# Patient Record
Sex: MALE | Race: WHITE | NOT HISPANIC OR LATINO | Employment: UNEMPLOYED | URBAN - NONMETROPOLITAN AREA
[De-identification: names, ages, dates, MRNs, and addresses within clinical notes are randomized per-mention and may not be internally consistent; named-entity substitution may affect disease eponyms.]

---

## 2017-01-23 ENCOUNTER — LAB (OUTPATIENT)
Dept: LAB | Facility: HOSPITAL | Age: 52
End: 2017-01-23

## 2017-01-23 ENCOUNTER — TRANSCRIBE ORDERS (OUTPATIENT)
Dept: LAB | Facility: HOSPITAL | Age: 52
End: 2017-01-23

## 2017-01-23 DIAGNOSIS — G50.0 TRIGEMINAL NEURALGIA: ICD-10-CM

## 2017-01-23 DIAGNOSIS — I10 ESSENTIAL HYPERTENSION, MALIGNANT: ICD-10-CM

## 2017-01-23 DIAGNOSIS — Z51.81 ENCOUNTER FOR THERAPEUTIC DRUG MONITORING: ICD-10-CM

## 2017-01-23 DIAGNOSIS — Z51.81 ENCOUNTER FOR THERAPEUTIC DRUG MONITORING: Primary | ICD-10-CM

## 2017-01-23 LAB
ANION GAP SERPL CALCULATED.3IONS-SCNC: 13 MMOL/L (ref 5–15)
BASOPHILS # BLD MANUAL: 0.06 10*3/MM3 (ref 0–0.2)
BASOPHILS NFR BLD AUTO: 1 % (ref 0–2)
BUN BLD-MCNC: 11 MG/DL (ref 7–21)
BUN/CREAT SERPL: 12.8 (ref 7–25)
CALCIUM SPEC-SCNC: 9.1 MG/DL (ref 8.4–10.2)
CARBAMAZEPINE SERPL-MCNC: 12.1 MCG/ML (ref 4–12)
CHLORIDE SERPL-SCNC: 95 MMOL/L (ref 95–110)
CO2 SERPL-SCNC: 28 MMOL/L (ref 22–31)
CREAT BLD-MCNC: 0.86 MG/DL (ref 0.7–1.3)
DEPRECATED RDW RBC AUTO: 42 FL (ref 35.1–43.9)
EOSINOPHIL # BLD MANUAL: 0.18 10*3/MM3 (ref 0–0.7)
EOSINOPHIL NFR BLD MANUAL: 3 % (ref 0–7)
ERYTHROCYTE [DISTWIDTH] IN BLOOD BY AUTOMATED COUNT: 12.3 % (ref 11.5–14.5)
GFR SERPL CREATININE-BSD FRML MDRD: 94 ML/MIN/1.73 (ref 56–130)
GLUCOSE BLD-MCNC: 88 MG/DL (ref 60–100)
HCT VFR BLD AUTO: 39.9 % (ref 39–49)
HGB BLD-MCNC: 13.6 G/DL (ref 13.7–17.3)
LYMPHOCYTES # BLD MANUAL: 1.49 10*3/MM3 (ref 0.6–4.2)
LYMPHOCYTES NFR BLD MANUAL: 25 % (ref 10–50)
LYMPHOCYTES NFR BLD MANUAL: 3 % (ref 0–12)
MCH RBC QN AUTO: 31.4 PG (ref 26.5–34)
MCHC RBC AUTO-ENTMCNC: 34.1 G/DL (ref 31.5–36.3)
MCV RBC AUTO: 92.1 FL (ref 80–98)
MONOCYTES # BLD AUTO: 0.18 10*3/MM3 (ref 0–0.9)
NEUTROPHILS # BLD AUTO: 4.05 10*3/MM3 (ref 2–8.6)
NEUTROPHILS NFR BLD MANUAL: 68 % (ref 37–80)
PLAT MORPH BLD: NORMAL
PLATELET # BLD AUTO: 212 10*3/MM3 (ref 150–450)
PMV BLD AUTO: 9.8 FL (ref 8–12)
POTASSIUM BLD-SCNC: 4.1 MMOL/L (ref 3.5–5.1)
RBC # BLD AUTO: 4.33 10*6/MM3 (ref 4.37–5.74)
RBC MORPH BLD: NORMAL
SODIUM BLD-SCNC: 136 MMOL/L (ref 137–145)
WBC MORPH BLD: NORMAL
WBC NRBC COR # BLD: 5.95 10*3/MM3 (ref 3.2–9.8)

## 2017-01-23 PROCEDURE — 80156 ASSAY CARBAMAZEPINE TOTAL: CPT | Performed by: PSYCHIATRY & NEUROLOGY

## 2017-01-23 PROCEDURE — 85027 COMPLETE CBC AUTOMATED: CPT | Performed by: PSYCHIATRY & NEUROLOGY

## 2017-01-23 PROCEDURE — 80048 BASIC METABOLIC PNL TOTAL CA: CPT | Performed by: PSYCHIATRY & NEUROLOGY

## 2017-01-23 PROCEDURE — 36415 COLL VENOUS BLD VENIPUNCTURE: CPT

## 2017-01-23 PROCEDURE — 85007 BL SMEAR W/DIFF WBC COUNT: CPT | Performed by: PSYCHIATRY & NEUROLOGY

## 2017-10-25 ENCOUNTER — TRANSCRIBE ORDERS (OUTPATIENT)
Dept: LAB | Facility: HOSPITAL | Age: 52
End: 2017-10-25

## 2017-10-25 ENCOUNTER — LAB (OUTPATIENT)
Dept: LAB | Facility: HOSPITAL | Age: 52
End: 2017-10-25

## 2017-10-25 DIAGNOSIS — Z51.81 ENCOUNTER FOR THERAPEUTIC DRUG MONITORING: Primary | ICD-10-CM

## 2017-10-25 DIAGNOSIS — G50.0 TRIGEMINAL NEURALGIA: ICD-10-CM

## 2017-10-25 DIAGNOSIS — Z51.81 ENCOUNTER FOR THERAPEUTIC DRUG MONITORING: ICD-10-CM

## 2017-10-25 LAB
ALBUMIN SERPL-MCNC: 4.8 G/DL (ref 3.4–4.8)
ALBUMIN/GLOB SERPL: 1.5 G/DL (ref 1.1–1.8)
ALP SERPL-CCNC: 58 U/L (ref 38–126)
ALT SERPL W P-5'-P-CCNC: 38 U/L (ref 21–72)
ANION GAP SERPL CALCULATED.3IONS-SCNC: 17 MMOL/L (ref 5–15)
AST SERPL-CCNC: 34 U/L (ref 17–59)
BASOPHILS # BLD AUTO: 0.01 10*3/MM3 (ref 0–0.2)
BASOPHILS NFR BLD AUTO: 0.2 % (ref 0–2)
BILIRUB SERPL-MCNC: 0.4 MG/DL (ref 0.2–1.3)
BUN BLD-MCNC: 9 MG/DL (ref 7–21)
BUN/CREAT SERPL: 10.8 (ref 7–25)
CALCIUM SPEC-SCNC: 9.5 MG/DL (ref 8.4–10.2)
CARBAMAZEPINE SERPL-MCNC: 13.2 MCG/ML (ref 4–12)
CHLORIDE SERPL-SCNC: 93 MMOL/L (ref 95–110)
CO2 SERPL-SCNC: 27 MMOL/L (ref 22–31)
CREAT BLD-MCNC: 0.83 MG/DL (ref 0.7–1.3)
DEPRECATED RDW RBC AUTO: 40.4 FL (ref 35.1–43.9)
EOSINOPHIL # BLD AUTO: 0.1 10*3/MM3 (ref 0–0.7)
EOSINOPHIL NFR BLD AUTO: 2 % (ref 0–7)
ERYTHROCYTE [DISTWIDTH] IN BLOOD BY AUTOMATED COUNT: 12.1 % (ref 11.5–14.5)
GFR SERPL CREATININE-BSD FRML MDRD: 98 ML/MIN/1.73 (ref 56–130)
GLOBULIN UR ELPH-MCNC: 3.2 GM/DL (ref 2.3–3.5)
GLUCOSE BLD-MCNC: 87 MG/DL (ref 60–100)
HCT VFR BLD AUTO: 39.9 % (ref 39–49)
HGB BLD-MCNC: 14.2 G/DL (ref 13.7–17.3)
IMM GRANULOCYTES # BLD: 0.02 10*3/MM3 (ref 0–0.02)
IMM GRANULOCYTES NFR BLD: 0.4 % (ref 0–0.5)
LYMPHOCYTES # BLD AUTO: 1.38 10*3/MM3 (ref 0.6–4.2)
LYMPHOCYTES NFR BLD AUTO: 27.3 % (ref 10–50)
MCH RBC QN AUTO: 32.4 PG (ref 26.5–34)
MCHC RBC AUTO-ENTMCNC: 35.6 G/DL (ref 31.5–36.3)
MCV RBC AUTO: 91.1 FL (ref 80–98)
MONOCYTES # BLD AUTO: 0.64 10*3/MM3 (ref 0–0.9)
MONOCYTES NFR BLD AUTO: 12.6 % (ref 0–12)
NEUTROPHILS # BLD AUTO: 2.91 10*3/MM3 (ref 2–8.6)
NEUTROPHILS NFR BLD AUTO: 57.5 % (ref 37–80)
PLATELET # BLD AUTO: 241 10*3/MM3 (ref 150–450)
PMV BLD AUTO: 9.1 FL (ref 8–12)
POTASSIUM BLD-SCNC: 4.1 MMOL/L (ref 3.5–5.1)
PROT SERPL-MCNC: 8 G/DL (ref 6.3–8.6)
RBC # BLD AUTO: 4.38 10*6/MM3 (ref 4.37–5.74)
SODIUM BLD-SCNC: 137 MMOL/L (ref 137–145)
WBC NRBC COR # BLD: 5.06 10*3/MM3 (ref 3.2–9.8)

## 2017-10-25 PROCEDURE — 85025 COMPLETE CBC W/AUTO DIFF WBC: CPT | Performed by: PSYCHIATRY & NEUROLOGY

## 2017-10-25 PROCEDURE — 80053 COMPREHEN METABOLIC PANEL: CPT | Performed by: PSYCHIATRY & NEUROLOGY

## 2017-10-25 PROCEDURE — 36415 COLL VENOUS BLD VENIPUNCTURE: CPT

## 2017-10-25 PROCEDURE — 80156 ASSAY CARBAMAZEPINE TOTAL: CPT | Performed by: PSYCHIATRY & NEUROLOGY

## 2018-10-22 ENCOUNTER — LAB REQUISITION (OUTPATIENT)
Dept: LAB | Facility: OTHER | Age: 53
End: 2018-10-22

## 2018-10-22 DIAGNOSIS — I10 ESSENTIAL (PRIMARY) HYPERTENSION: ICD-10-CM

## 2018-10-22 DIAGNOSIS — M62.81 MUSCLE WEAKNESS (GENERALIZED): ICD-10-CM

## 2018-10-22 DIAGNOSIS — E78.5 HYPERLIPIDEMIA: ICD-10-CM

## 2018-10-23 LAB
25(OH)D3 SERPL-MCNC: <12.8 NG/ML (ref 30–100)
ALBUMIN SERPL-MCNC: 4 G/DL (ref 3.5–5)
ALBUMIN/GLOB SERPL: 0.9 G/DL (ref 1.1–1.8)
ALP SERPL-CCNC: 81 U/L (ref 38–126)
ALT SERPL W P-5'-P-CCNC: 61 U/L
ANION GAP SERPL CALCULATED.3IONS-SCNC: 11 MMOL/L (ref 5–15)
AST SERPL-CCNC: 43 U/L (ref 17–59)
BASOPHILS # BLD AUTO: 0.01 10*3/MM3 (ref 0–0.2)
BASOPHILS NFR BLD AUTO: 0.1 % (ref 0–2)
BILIRUB SERPL-MCNC: 0.3 MG/DL (ref 0.2–1.3)
BUN BLD-MCNC: 32 MG/DL (ref 9–20)
BUN/CREAT SERPL: 25 (ref 7–25)
CALCIUM SPEC-SCNC: 10.4 MG/DL (ref 8.4–10.2)
CHLORIDE SERPL-SCNC: 107 MMOL/L (ref 98–107)
CHOLEST SERPL-MCNC: 109 MG/DL (ref 150–200)
CO2 SERPL-SCNC: 28 MMOL/L (ref 22–30)
CREAT BLD-MCNC: 1.28 MG/DL (ref 0.66–1.25)
DEPRECATED RDW RBC AUTO: 49.1 FL (ref 35.1–43.9)
EOSINOPHIL # BLD AUTO: 0.1 10*3/MM3 (ref 0–0.7)
EOSINOPHIL NFR BLD AUTO: 1.5 % (ref 0–7)
ERYTHROCYTE [DISTWIDTH] IN BLOOD BY AUTOMATED COUNT: 14.2 % (ref 11.5–14.5)
GFR SERPL CREATININE-BSD FRML MDRD: 59 ML/MIN/1.73 (ref 56–130)
GLOBULIN UR ELPH-MCNC: 4.3 GM/DL (ref 2.3–3.5)
GLUCOSE BLD-MCNC: 98 MG/DL (ref 74–99)
HCT VFR BLD AUTO: 38.5 % (ref 39–49)
HDLC SERPL-MCNC: 29 MG/DL (ref 40–59)
HGB BLD-MCNC: 11.9 G/DL (ref 13.7–17.3)
LDLC SERPL CALC-MCNC: 58 MG/DL
LDLC/HDLC SERPL: 1.99 {RATIO} (ref 0–3.55)
LYMPHOCYTES # BLD AUTO: 1.07 10*3/MM3 (ref 0.6–4.2)
LYMPHOCYTES NFR BLD AUTO: 16 % (ref 10–50)
MCH RBC QN AUTO: 30.1 PG (ref 26.5–34)
MCHC RBC AUTO-ENTMCNC: 30.9 G/DL (ref 31.5–36.3)
MCV RBC AUTO: 97.5 FL (ref 80–98)
MONOCYTES # BLD AUTO: 0.79 10*3/MM3 (ref 0–0.9)
MONOCYTES NFR BLD AUTO: 11.8 % (ref 0–12)
NEUTROPHILS # BLD AUTO: 4.7 10*3/MM3 (ref 2–8.6)
NEUTROPHILS NFR BLD AUTO: 70.6 % (ref 37–80)
PLATELET # BLD AUTO: 453 10*3/MM3 (ref 150–450)
PMV BLD AUTO: 9.3 FL (ref 8–12)
POTASSIUM BLD-SCNC: 3.9 MMOL/L (ref 3.4–5)
PROT SERPL-MCNC: 8.3 G/DL (ref 6.3–8.2)
RBC # BLD AUTO: 3.95 10*6/MM3 (ref 4.37–5.74)
SODIUM BLD-SCNC: 146 MMOL/L (ref 137–145)
T4 SERPL-MCNC: 11.8 MCG/DL (ref 5.5–11)
TRIGL SERPL-MCNC: 112 MG/DL
TSH SERPL DL<=0.05 MIU/L-ACNC: 1.49 MIU/ML (ref 0.46–4.68)
VLDLC SERPL-MCNC: 22.4 MG/DL
WBC NRBC COR # BLD: 6.67 10*3/MM3 (ref 3.2–9.8)

## 2018-10-23 PROCEDURE — 84436 ASSAY OF TOTAL THYROXINE: CPT | Performed by: INTERNAL MEDICINE

## 2018-10-23 PROCEDURE — 80053 COMPREHEN METABOLIC PANEL: CPT | Performed by: INTERNAL MEDICINE

## 2018-10-23 PROCEDURE — 84443 ASSAY THYROID STIM HORMONE: CPT | Performed by: INTERNAL MEDICINE

## 2018-10-23 PROCEDURE — 80061 LIPID PANEL: CPT | Performed by: INTERNAL MEDICINE

## 2018-10-23 PROCEDURE — 36415 COLL VENOUS BLD VENIPUNCTURE: CPT | Performed by: INTERNAL MEDICINE

## 2018-10-23 PROCEDURE — 82306 VITAMIN D 25 HYDROXY: CPT | Performed by: INTERNAL MEDICINE

## 2018-10-23 PROCEDURE — 85025 COMPLETE CBC W/AUTO DIFF WBC: CPT | Performed by: INTERNAL MEDICINE

## 2018-10-26 ENCOUNTER — LAB REQUISITION (OUTPATIENT)
Dept: LAB | Facility: OTHER | Age: 53
End: 2018-10-26

## 2018-10-26 DIAGNOSIS — N39.0 URINARY TRACT INFECTION: ICD-10-CM

## 2018-10-26 LAB
AMORPH URATE CRY URNS QL MICRO: ABNORMAL /HPF
BACTERIA UR QL AUTO: ABNORMAL /HPF
BILIRUB UR QL STRIP: NEGATIVE
CLARITY UR: ABNORMAL
COLOR UR: ABNORMAL
GLUCOSE UR STRIP-MCNC: NEGATIVE MG/DL
HGB UR QL STRIP.AUTO: ABNORMAL
HYALINE CASTS UR QL AUTO: ABNORMAL /LPF
KETONES UR QL STRIP: NEGATIVE
LEUKOCYTE ESTERASE UR QL STRIP.AUTO: ABNORMAL
MUCOUS THREADS URNS QL MICRO: ABNORMAL /HPF
NITRITE UR QL STRIP: NEGATIVE
PH UR STRIP.AUTO: 5.5 [PH] (ref 5.5–8)
PROT UR QL STRIP: ABNORMAL
RBC # UR: ABNORMAL /HPF
REF LAB TEST METHOD: ABNORMAL
SP GR UR STRIP: >=1.03 (ref 1–1.03)
SQUAMOUS #/AREA URNS HPF: ABNORMAL /HPF
UROBILINOGEN UR QL STRIP: ABNORMAL
WBC UR QL AUTO: ABNORMAL /HPF

## 2018-10-26 PROCEDURE — 87086 URINE CULTURE/COLONY COUNT: CPT | Performed by: INTERNAL MEDICINE

## 2018-10-26 PROCEDURE — 81001 URINALYSIS AUTO W/SCOPE: CPT | Performed by: INTERNAL MEDICINE

## 2018-10-28 LAB — BACTERIA SPEC AEROBE CULT: NORMAL

## 2018-12-03 ENCOUNTER — LAB REQUISITION (OUTPATIENT)
Dept: LAB | Facility: OTHER | Age: 53
End: 2018-12-03

## 2018-12-03 DIAGNOSIS — I10 ESSENTIAL (PRIMARY) HYPERTENSION: ICD-10-CM

## 2018-12-04 LAB
ALBUMIN SERPL-MCNC: 3.8 G/DL (ref 3.5–5)
ALBUMIN/GLOB SERPL: 1.3 G/DL (ref 1.1–1.8)
ALP SERPL-CCNC: 87 U/L (ref 38–126)
ALT SERPL W P-5'-P-CCNC: 22 U/L
ANION GAP SERPL CALCULATED.3IONS-SCNC: 5 MMOL/L (ref 5–15)
AST SERPL-CCNC: 17 U/L (ref 17–59)
BASOPHILS # BLD AUTO: 0.01 10*3/MM3 (ref 0–0.2)
BASOPHILS NFR BLD AUTO: 0.2 % (ref 0–2)
BILIRUB SERPL-MCNC: 0.3 MG/DL (ref 0.2–1.3)
BUN BLD-MCNC: 19 MG/DL (ref 9–20)
BUN/CREAT SERPL: 25 (ref 7–25)
CALCIUM SPEC-SCNC: 9.8 MG/DL (ref 8.4–10.2)
CHLORIDE SERPL-SCNC: 111 MMOL/L (ref 98–107)
CO2 SERPL-SCNC: 25 MMOL/L (ref 22–30)
CREAT BLD-MCNC: 0.76 MG/DL (ref 0.66–1.25)
DEPRECATED RDW RBC AUTO: 45.6 FL (ref 35.1–43.9)
EOSINOPHIL # BLD AUTO: 0.13 10*3/MM3 (ref 0–0.7)
EOSINOPHIL NFR BLD AUTO: 2 % (ref 0–7)
ERYTHROCYTE [DISTWIDTH] IN BLOOD BY AUTOMATED COUNT: 14.2 % (ref 11.5–14.5)
GFR SERPL CREATININE-BSD FRML MDRD: 108 ML/MIN/1.73 (ref 56–130)
GLOBULIN UR ELPH-MCNC: 2.9 GM/DL (ref 2.3–3.5)
GLUCOSE BLD-MCNC: 98 MG/DL (ref 74–99)
HCT VFR BLD AUTO: 34.5 % (ref 39–49)
HGB BLD-MCNC: 11.2 G/DL (ref 13.7–17.3)
LYMPHOCYTES # BLD AUTO: 1.72 10*3/MM3 (ref 0.6–4.2)
LYMPHOCYTES NFR BLD AUTO: 26.9 % (ref 10–50)
MCH RBC QN AUTO: 29.6 PG (ref 26.5–34)
MCHC RBC AUTO-ENTMCNC: 32.5 G/DL (ref 31.5–36.3)
MCV RBC AUTO: 91.3 FL (ref 80–98)
MONOCYTES # BLD AUTO: 0.59 10*3/MM3 (ref 0–0.9)
MONOCYTES NFR BLD AUTO: 9.2 % (ref 0–12)
NEUTROPHILS # BLD AUTO: 3.94 10*3/MM3 (ref 2–8.6)
NEUTROPHILS NFR BLD AUTO: 61.7 % (ref 37–80)
PLATELET # BLD AUTO: 263 10*3/MM3 (ref 150–450)
PMV BLD AUTO: 9.9 FL (ref 8–12)
POTASSIUM BLD-SCNC: 4 MMOL/L (ref 3.4–5)
PROT SERPL-MCNC: 6.7 G/DL (ref 6.3–8.2)
RBC # BLD AUTO: 3.78 10*6/MM3 (ref 4.37–5.74)
SODIUM BLD-SCNC: 141 MMOL/L (ref 137–145)
WBC NRBC COR # BLD: 6.39 10*3/MM3 (ref 3.2–9.8)

## 2018-12-04 PROCEDURE — 80053 COMPREHEN METABOLIC PANEL: CPT | Performed by: INTERNAL MEDICINE

## 2018-12-04 PROCEDURE — 85025 COMPLETE CBC W/AUTO DIFF WBC: CPT | Performed by: INTERNAL MEDICINE

## 2019-02-19 ENCOUNTER — LAB REQUISITION (OUTPATIENT)
Dept: LAB | Facility: OTHER | Age: 54
End: 2019-02-19

## 2019-02-19 DIAGNOSIS — E78.5 HYPERLIPIDEMIA: ICD-10-CM

## 2019-02-19 LAB
CHOLEST SERPL-MCNC: 109 MG/DL (ref 150–200)
HDLC SERPL-MCNC: 52 MG/DL (ref 40–59)
LDLC SERPL CALC-MCNC: 41 MG/DL
LDLC/HDLC SERPL: 0.78 {RATIO} (ref 0–3.55)
TRIGL SERPL-MCNC: 81 MG/DL
VLDLC SERPL-MCNC: 16.2 MG/DL

## 2019-02-19 PROCEDURE — 80061 LIPID PANEL: CPT | Performed by: INTERNAL MEDICINE

## 2019-04-25 ENCOUNTER — HOSPITAL ENCOUNTER (INPATIENT)
Facility: HOSPITAL | Age: 54
LOS: 12 days | Discharge: HOME | End: 2019-05-07
Attending: PSYCHIATRY & NEUROLOGY | Admitting: PSYCHIATRY & NEUROLOGY

## 2019-04-25 ENCOUNTER — HOSPITAL ENCOUNTER (EMERGENCY)
Facility: HOSPITAL | Age: 54
Discharge: PSYCHIATRIC OR SUBSTANCE USE FACILITY | End: 2019-04-25
Attending: EMERGENCY MEDICINE

## 2019-04-25 VITALS
HEIGHT: 74 IN | TEMPERATURE: 97.6 F | OXYGEN SATURATION: 96 % | WEIGHT: 153.6 LBS | BODY MASS INDEX: 19.71 KG/M2 | RESPIRATION RATE: 18 BRPM | SYSTOLIC BLOOD PRESSURE: 103 MMHG | DIASTOLIC BLOOD PRESSURE: 73 MMHG | HEART RATE: 88 BPM

## 2019-04-25 DIAGNOSIS — Z78.9 IMPAIRED MOBILITY AND ACTIVITIES OF DAILY LIVING: ICD-10-CM

## 2019-04-25 DIAGNOSIS — Z74.09 IMPAIRED MOBILITY AND ACTIVITIES OF DAILY LIVING: ICD-10-CM

## 2019-04-25 DIAGNOSIS — R46.89 AGGRESSIVE BEHAVIOR OF ADULT: Primary | ICD-10-CM

## 2019-04-25 DIAGNOSIS — Z74.09 IMPAIRED FUNCTIONAL MOBILITY, BALANCE, AND ENDURANCE: ICD-10-CM

## 2019-04-25 DIAGNOSIS — R13.11 ORAL PHASE DYSPHAGIA: ICD-10-CM

## 2019-04-25 PROBLEM — F91.9 BEHAVIOR DISTURBANCE: Status: ACTIVE | Noted: 2019-04-25

## 2019-04-25 LAB
ALBUMIN SERPL-MCNC: 4.1 G/DL (ref 3.5–5.2)
ALBUMIN/GLOB SERPL: 1.3 G/DL
ALP SERPL-CCNC: 103 U/L (ref 39–117)
ALT SERPL W P-5'-P-CCNC: 16 U/L (ref 1–41)
AMPHET+METHAMPHET UR QL: NEGATIVE
ANION GAP SERPL CALCULATED.3IONS-SCNC: 12 MMOL/L
APAP SERPL-MCNC: <5 MCG/ML (ref 10–30)
AST SERPL-CCNC: 14 U/L (ref 1–40)
BARBITURATES UR QL SCN: NEGATIVE
BASOPHILS # BLD AUTO: 0.02 10*3/MM3 (ref 0–0.2)
BASOPHILS NFR BLD AUTO: 0.3 % (ref 0–1.5)
BENZODIAZ UR QL SCN: NEGATIVE
BILIRUB SERPL-MCNC: 0.2 MG/DL (ref 0.2–1.2)
BUN BLD-MCNC: 13 MG/DL (ref 6–20)
BUN/CREAT SERPL: 14.6 (ref 7–25)
CALCIUM SPEC-SCNC: 10.1 MG/DL (ref 8.6–10.5)
CANNABINOIDS SERPL QL: NEGATIVE
CHLORIDE SERPL-SCNC: 102 MMOL/L (ref 98–107)
CO2 SERPL-SCNC: 29 MMOL/L (ref 22–29)
COCAINE UR QL: NEGATIVE
CREAT BLD-MCNC: 0.89 MG/DL (ref 0.76–1.27)
DEPRECATED RDW RBC AUTO: 45.1 FL (ref 37–54)
EOSINOPHIL # BLD AUTO: 0.54 10*3/MM3 (ref 0–0.4)
EOSINOPHIL NFR BLD AUTO: 9.2 % (ref 0.3–6.2)
ERYTHROCYTE [DISTWIDTH] IN BLOOD BY AUTOMATED COUNT: 13.6 % (ref 12.3–15.4)
ETHANOL BLD-MCNC: <10 MG/DL (ref 0–10)
ETHANOL UR QL: <0.01 %
GFR SERPL CREATININE-BSD FRML MDRD: 90 ML/MIN/1.73
GLOBULIN UR ELPH-MCNC: 3.2 GM/DL
GLUCOSE BLD-MCNC: 84 MG/DL (ref 65–99)
HCT VFR BLD AUTO: 35.7 % (ref 37.5–51)
HGB BLD-MCNC: 11.7 G/DL (ref 13–17.7)
HOLD SPECIMEN: NORMAL
IMM GRANULOCYTES # BLD AUTO: 0.01 10*3/MM3 (ref 0–0.05)
IMM GRANULOCYTES NFR BLD AUTO: 0.2 % (ref 0–0.5)
LYMPHOCYTES # BLD AUTO: 1.63 10*3/MM3 (ref 0.7–3.1)
LYMPHOCYTES NFR BLD AUTO: 27.8 % (ref 19.6–45.3)
MCH RBC QN AUTO: 29.8 PG (ref 26.6–33)
MCHC RBC AUTO-ENTMCNC: 32.8 G/DL (ref 31.5–35.7)
MCV RBC AUTO: 91.1 FL (ref 79–97)
METHADONE UR QL SCN: NEGATIVE
MONOCYTES # BLD AUTO: 0.62 10*3/MM3 (ref 0.1–0.9)
MONOCYTES NFR BLD AUTO: 10.6 % (ref 5–12)
NEUTROPHILS # BLD AUTO: 3.05 10*3/MM3 (ref 1.7–7)
NEUTROPHILS NFR BLD AUTO: 51.9 % (ref 42.7–76)
NRBC BLD AUTO-RTO: 0 /100 WBC (ref 0–0.2)
OPIATES UR QL: NEGATIVE
OXYCODONE UR QL SCN: NEGATIVE
PLATELET # BLD AUTO: 199 10*3/MM3 (ref 140–450)
PMV BLD AUTO: 9.5 FL (ref 6–12)
POTASSIUM BLD-SCNC: 4 MMOL/L (ref 3.5–5.2)
PROT SERPL-MCNC: 7.3 G/DL (ref 6–8.5)
RBC # BLD AUTO: 3.92 10*6/MM3 (ref 4.14–5.8)
SALICYLATES SERPL-MCNC: <0.3 MG/DL
SODIUM BLD-SCNC: 143 MMOL/L (ref 136–145)
WBC NRBC COR # BLD: 5.87 10*3/MM3 (ref 3.4–10.8)
WHOLE BLOOD HOLD SPECIMEN: NORMAL
WHOLE BLOOD HOLD SPECIMEN: NORMAL

## 2019-04-25 PROCEDURE — 80307 DRUG TEST PRSMV CHEM ANLYZR: CPT | Performed by: PHYSICIAN ASSISTANT

## 2019-04-25 PROCEDURE — 80307 DRUG TEST PRSMV CHEM ANLYZR: CPT | Performed by: EMERGENCY MEDICINE

## 2019-04-25 PROCEDURE — 85025 COMPLETE CBC W/AUTO DIFF WBC: CPT | Performed by: PHYSICIAN ASSISTANT

## 2019-04-25 PROCEDURE — 36415 COLL VENOUS BLD VENIPUNCTURE: CPT

## 2019-04-25 PROCEDURE — 80053 COMPREHEN METABOLIC PANEL: CPT | Performed by: PHYSICIAN ASSISTANT

## 2019-04-25 PROCEDURE — 99285 EMERGENCY DEPT VISIT HI MDM: CPT

## 2019-04-25 RX ORDER — CLONIDINE HYDROCHLORIDE 0.1 MG/1
0.1 TABLET ORAL EVERY 4 HOURS PRN
Status: CANCELLED | OUTPATIENT
Start: 2019-04-25

## 2019-04-25 RX ORDER — TRAZODONE HYDROCHLORIDE 50 MG/1
50 TABLET ORAL NIGHTLY PRN
Status: DISCONTINUED | OUTPATIENT
Start: 2019-04-25 | End: 2019-05-07 | Stop reason: HOSPADM

## 2019-04-25 RX ORDER — ACETAMINOPHEN 325 MG/1
650 TABLET ORAL EVERY 4 HOURS PRN
Status: CANCELLED | OUTPATIENT
Start: 2019-04-25

## 2019-04-25 RX ORDER — LOPERAMIDE HYDROCHLORIDE 2 MG/1
2 CAPSULE ORAL 4 TIMES DAILY PRN
Status: DISCONTINUED | OUTPATIENT
Start: 2019-04-25 | End: 2019-05-07 | Stop reason: HOSPADM

## 2019-04-25 RX ORDER — LOPERAMIDE HYDROCHLORIDE 2 MG/1
2 CAPSULE ORAL 4 TIMES DAILY PRN
Status: CANCELLED | OUTPATIENT
Start: 2019-04-25

## 2019-04-25 RX ORDER — CLONIDINE HYDROCHLORIDE 0.1 MG/1
0.1 TABLET ORAL EVERY 4 HOURS PRN
Status: DISCONTINUED | OUTPATIENT
Start: 2019-04-25 | End: 2019-05-07 | Stop reason: HOSPADM

## 2019-04-25 RX ORDER — ALUMINA, MAGNESIA, AND SIMETHICONE 2400; 2400; 240 MG/30ML; MG/30ML; MG/30ML
15 SUSPENSION ORAL EVERY 6 HOURS PRN
Status: CANCELLED | OUTPATIENT
Start: 2019-04-25

## 2019-04-25 RX ORDER — ALUMINA, MAGNESIA, AND SIMETHICONE 2400; 2400; 240 MG/30ML; MG/30ML; MG/30ML
15 SUSPENSION ORAL EVERY 6 HOURS PRN
Status: DISCONTINUED | OUTPATIENT
Start: 2019-04-25 | End: 2019-05-07 | Stop reason: HOSPADM

## 2019-04-25 RX ORDER — HYDROXYZINE PAMOATE 25 MG/1
50 CAPSULE ORAL EVERY 6 HOURS PRN
Status: CANCELLED | OUTPATIENT
Start: 2019-04-25

## 2019-04-25 RX ORDER — TRAZODONE HYDROCHLORIDE 50 MG/1
50 TABLET ORAL NIGHTLY PRN
Status: CANCELLED | OUTPATIENT
Start: 2019-04-25

## 2019-04-25 RX ORDER — HYDROXYZINE PAMOATE 50 MG/1
50 CAPSULE ORAL EVERY 6 HOURS PRN
Status: DISCONTINUED | OUTPATIENT
Start: 2019-04-25 | End: 2019-05-07 | Stop reason: HOSPADM

## 2019-04-25 RX ORDER — ACETAMINOPHEN 325 MG/1
650 TABLET ORAL EVERY 4 HOURS PRN
Status: DISCONTINUED | OUTPATIENT
Start: 2019-04-25 | End: 2019-05-07 | Stop reason: HOSPADM

## 2019-04-26 PROBLEM — F33.3 SEVERE EPISODE OF RECURRENT MAJOR DEPRESSIVE DISORDER, WITH PSYCHOTIC FEATURES (HCC): Status: ACTIVE | Noted: 2019-04-26

## 2019-04-26 PROBLEM — F03.918 DEMENTIA WITH BEHAVIORAL DISTURBANCE: Status: ACTIVE | Noted: 2019-04-25

## 2019-04-26 PROBLEM — F33.2 SEVERE EPISODE OF RECURRENT MAJOR DEPRESSIVE DISORDER, WITHOUT PSYCHOTIC FEATURES (HCC): Status: ACTIVE | Noted: 2019-04-26

## 2019-04-26 PROBLEM — F06.2 PSYCHOTIC DISORDER DUE TO ANOTHER MEDICAL CONDITION WITH DELUSIONS: Status: ACTIVE | Noted: 2019-04-26

## 2019-04-26 LAB
CHOLEST SERPL-MCNC: 98 MG/DL (ref 0–200)
GLUCOSE P FAST SERPL-MCNC: 90 MG/DL (ref 72–112)
HDLC SERPL-MCNC: 45 MG/DL (ref 40–60)
LDLC SERPL CALC-MCNC: 45 MG/DL (ref 0–100)
LDLC/HDLC SERPL: 1 {RATIO}
TRIGL SERPL-MCNC: 39 MG/DL (ref 0–150)
VLDLC SERPL-MCNC: 7.8 MG/DL

## 2019-04-26 PROCEDURE — 82947 ASSAY GLUCOSE BLOOD QUANT: CPT | Performed by: PSYCHIATRY & NEUROLOGY

## 2019-04-26 PROCEDURE — 90791 PSYCH DIAGNOSTIC EVALUATION: CPT | Performed by: PSYCHIATRY & NEUROLOGY

## 2019-04-26 PROCEDURE — 80061 LIPID PANEL: CPT | Performed by: PSYCHIATRY & NEUROLOGY

## 2019-04-26 PROCEDURE — 99232 SBSQ HOSP IP/OBS MODERATE 35: CPT | Performed by: FAMILY MEDICINE

## 2019-04-26 PROCEDURE — 93010 ELECTROCARDIOGRAM REPORT: CPT | Performed by: INTERNAL MEDICINE

## 2019-04-26 PROCEDURE — 93005 ELECTROCARDIOGRAM TRACING: CPT | Performed by: PSYCHIATRY & NEUROLOGY

## 2019-04-26 RX ORDER — UREA 10 %
8 LOTION (ML) TOPICAL NIGHTLY
COMMUNITY
End: 2019-05-07 | Stop reason: HOSPADM

## 2019-04-26 RX ORDER — CYCLOBENZAPRINE HCL 10 MG
10 TABLET ORAL 3 TIMES DAILY
Status: DISCONTINUED | OUTPATIENT
Start: 2019-04-26 | End: 2019-05-07 | Stop reason: HOSPADM

## 2019-04-26 RX ORDER — RANITIDINE 150 MG/1
150 TABLET ORAL NIGHTLY
COMMUNITY

## 2019-04-26 RX ORDER — ATORVASTATIN CALCIUM 10 MG/1
10 TABLET, FILM COATED ORAL DAILY
COMMUNITY

## 2019-04-26 RX ORDER — ASPIRIN 81 MG/1
81 TABLET, CHEWABLE ORAL DAILY
Status: DISCONTINUED | OUTPATIENT
Start: 2019-04-26 | End: 2019-05-07 | Stop reason: HOSPADM

## 2019-04-26 RX ORDER — QUETIAPINE FUMARATE 50 MG/1
50 TABLET, FILM COATED ORAL 2 TIMES DAILY
COMMUNITY
End: 2019-05-07 | Stop reason: HOSPADM

## 2019-04-26 RX ORDER — BUSPIRONE HYDROCHLORIDE 10 MG/1
10 TABLET ORAL 3 TIMES DAILY
Status: DISCONTINUED | OUTPATIENT
Start: 2019-04-26 | End: 2019-05-01

## 2019-04-26 RX ORDER — OMEPRAZOLE 40 MG/1
40 CAPSULE, DELAYED RELEASE ORAL DAILY
COMMUNITY

## 2019-04-26 RX ORDER — ONDANSETRON HYDROCHLORIDE 8 MG/1
TABLET, FILM COATED ORAL
COMMUNITY
Start: 2018-11-28 | End: 2019-05-07 | Stop reason: HOSPADM

## 2019-04-26 RX ORDER — BUSPIRONE HYDROCHLORIDE 10 MG/1
10 TABLET ORAL 3 TIMES DAILY
COMMUNITY
End: 2019-05-07 | Stop reason: HOSPADM

## 2019-04-26 RX ORDER — ATORVASTATIN CALCIUM 10 MG/1
10 TABLET, FILM COATED ORAL DAILY
Status: DISCONTINUED | OUTPATIENT
Start: 2019-04-26 | End: 2019-05-06

## 2019-04-26 RX ORDER — NITROGLYCERIN 0.4 MG/1
0.4 TABLET SUBLINGUAL
Status: DISCONTINUED | OUTPATIENT
Start: 2019-04-26 | End: 2019-05-07 | Stop reason: HOSPADM

## 2019-04-26 RX ORDER — TAMSULOSIN HYDROCHLORIDE 0.4 MG/1
0.4 CAPSULE ORAL NIGHTLY
Status: DISCONTINUED | OUTPATIENT
Start: 2019-04-26 | End: 2019-05-07 | Stop reason: HOSPADM

## 2019-04-26 RX ORDER — ERGOCALCIFEROL 1.25 MG/1
50000 CAPSULE ORAL
COMMUNITY
Start: 2019-01-19 | End: 2019-05-07 | Stop reason: HOSPADM

## 2019-04-26 RX ORDER — PANTOPRAZOLE SODIUM 40 MG/1
40 TABLET, DELAYED RELEASE ORAL EVERY MORNING
Status: DISCONTINUED | OUTPATIENT
Start: 2019-04-27 | End: 2019-05-03

## 2019-04-26 RX ORDER — LISINOPRIL 10 MG/1
10 TABLET ORAL DAILY
Status: DISCONTINUED | OUTPATIENT
Start: 2019-04-26 | End: 2019-05-07 | Stop reason: HOSPADM

## 2019-04-26 RX ORDER — LISINOPRIL 10 MG/1
10 TABLET ORAL DAILY
COMMUNITY
End: 2022-08-23

## 2019-04-26 RX ORDER — POLYETHYLENE GLYCOL 3350 17 G/17G
17 POWDER, FOR SOLUTION ORAL DAILY
COMMUNITY

## 2019-04-26 RX ORDER — QUETIAPINE FUMARATE 100 MG/1
100 TABLET, FILM COATED ORAL NIGHTLY
Status: DISCONTINUED | OUTPATIENT
Start: 2019-04-26 | End: 2019-04-27

## 2019-04-26 RX ORDER — OMEPRAZOLE 20 MG/1
20 CAPSULE, DELAYED RELEASE ORAL DAILY
COMMUNITY
End: 2019-05-07 | Stop reason: HOSPADM

## 2019-04-26 RX ORDER — CYCLOBENZAPRINE HCL 10 MG
10 TABLET ORAL 3 TIMES DAILY
COMMUNITY
End: 2022-08-23

## 2019-04-26 RX ORDER — VENLAFAXINE HYDROCHLORIDE 75 MG/1
75 CAPSULE, EXTENDED RELEASE ORAL DAILY
Status: DISCONTINUED | OUTPATIENT
Start: 2019-04-26 | End: 2019-04-29

## 2019-04-26 RX ORDER — POLYETHYLENE GLYCOL 3350 17 G/17G
17 POWDER, FOR SOLUTION ORAL DAILY
Status: DISCONTINUED | OUTPATIENT
Start: 2019-04-26 | End: 2019-05-07 | Stop reason: HOSPADM

## 2019-04-26 RX ORDER — LANOLIN ALCOHOL/MO/W.PET/CERES
3 CREAM (GRAM) TOPICAL NIGHTLY
Status: DISCONTINUED | OUTPATIENT
Start: 2019-04-26 | End: 2019-05-07 | Stop reason: HOSPADM

## 2019-04-26 RX ORDER — ACETAMINOPHEN 500 MG
500 TABLET ORAL
COMMUNITY
End: 2019-05-07 | Stop reason: HOSPADM

## 2019-04-26 RX ORDER — NITROGLYCERIN 0.4 MG/1
0.4 TABLET SUBLINGUAL
COMMUNITY
Start: 2018-05-31

## 2019-04-26 RX ORDER — FOLIC ACID 1 MG/1
1 TABLET ORAL DAILY
COMMUNITY

## 2019-04-26 RX ORDER — VENLAFAXINE HYDROCHLORIDE 150 MG/1
150 CAPSULE, EXTENDED RELEASE ORAL DAILY
COMMUNITY
End: 2019-05-07 | Stop reason: HOSPADM

## 2019-04-26 RX ORDER — FOLIC ACID 1 MG/1
1 TABLET ORAL DAILY
Status: DISCONTINUED | OUTPATIENT
Start: 2019-04-26 | End: 2019-05-07 | Stop reason: HOSPADM

## 2019-04-26 RX ORDER — AMLODIPINE BESYLATE 10 MG/1
10 TABLET ORAL DAILY
Status: DISCONTINUED | OUTPATIENT
Start: 2019-04-26 | End: 2019-05-07 | Stop reason: HOSPADM

## 2019-04-26 RX ORDER — ASPIRIN 81 MG/1
81 TABLET, CHEWABLE ORAL DAILY
COMMUNITY

## 2019-04-26 RX ORDER — AMLODIPINE BESYLATE 10 MG/1
10 TABLET ORAL DAILY
COMMUNITY

## 2019-04-26 RX ORDER — QUETIAPINE FUMARATE 100 MG/1
100 TABLET, FILM COATED ORAL 2 TIMES DAILY
COMMUNITY
End: 2019-05-07 | Stop reason: HOSPADM

## 2019-04-26 RX ORDER — TAMSULOSIN HYDROCHLORIDE 0.4 MG/1
1 CAPSULE ORAL NIGHTLY
COMMUNITY

## 2019-04-26 RX ADMIN — TAMSULOSIN HYDROCHLORIDE 0.4 MG: 0.4 CAPSULE ORAL at 20:42

## 2019-04-26 RX ADMIN — LISINOPRIL 10 MG: 10 TABLET ORAL at 18:27

## 2019-04-26 RX ADMIN — ASPIRIN 81 MG CHEWABLE TABLET 81 MG: 81 TABLET CHEWABLE at 18:25

## 2019-04-26 RX ADMIN — ERGOCALCIFEROL SOLN 200 MCG/ML (8000 UNIT/ML) 2000 UNITS: 8000 SOLUTION at 18:26

## 2019-04-26 RX ADMIN — CYCLOBENZAPRINE HYDROCHLORIDE 10 MG: 10 TABLET, FILM COATED ORAL at 20:42

## 2019-04-26 RX ADMIN — POLYETHYLENE GLYCOL 3350 17 G: 17 POWDER, FOR SOLUTION ORAL at 20:43

## 2019-04-26 RX ADMIN — ATORVASTATIN CALCIUM 10 MG: 10 TABLET, FILM COATED ORAL at 18:25

## 2019-04-26 RX ADMIN — FOLIC ACID 1 MG: 1 TABLET ORAL at 18:27

## 2019-04-26 RX ADMIN — QUETIAPINE 100 MG: 100 TABLET ORAL at 20:42

## 2019-04-26 RX ADMIN — MELATONIN 3 MG: at 20:42

## 2019-04-26 RX ADMIN — AMLODIPINE BESYLATE 10 MG: 10 TABLET ORAL at 18:25

## 2019-04-26 RX ADMIN — VENLAFAXINE HYDROCHLORIDE 75 MG: 75 CAPSULE, EXTENDED RELEASE ORAL at 18:28

## 2019-04-26 RX ADMIN — BUSPIRONE HYDROCHLORIDE 10 MG: 10 TABLET ORAL at 20:42

## 2019-04-27 PROCEDURE — 92610 EVALUATE SWALLOWING FUNCTION: CPT | Performed by: SPEECH-LANGUAGE PATHOLOGIST

## 2019-04-27 PROCEDURE — 99232 SBSQ HOSP IP/OBS MODERATE 35: CPT | Performed by: NURSE PRACTITIONER

## 2019-04-27 PROCEDURE — 97162 PT EVAL MOD COMPLEX 30 MIN: CPT | Performed by: PHYSICAL THERAPIST

## 2019-04-27 RX ORDER — RISPERIDONE 0.5 MG/1
0.5 TABLET ORAL NIGHTLY
Status: DISCONTINUED | OUTPATIENT
Start: 2019-04-27 | End: 2019-04-29

## 2019-04-27 RX ADMIN — CYCLOBENZAPRINE HYDROCHLORIDE 10 MG: 10 TABLET, FILM COATED ORAL at 08:44

## 2019-04-27 RX ADMIN — ASPIRIN 81 MG CHEWABLE TABLET 81 MG: 81 TABLET CHEWABLE at 08:39

## 2019-04-27 RX ADMIN — RISPERIDONE 0.5 MG: 0.5 TABLET ORAL at 20:27

## 2019-04-27 RX ADMIN — ATORVASTATIN CALCIUM 10 MG: 10 TABLET, FILM COATED ORAL at 08:39

## 2019-04-27 RX ADMIN — CYCLOBENZAPRINE HYDROCHLORIDE 10 MG: 10 TABLET, FILM COATED ORAL at 16:51

## 2019-04-27 RX ADMIN — VENLAFAXINE HYDROCHLORIDE 75 MG: 75 CAPSULE, EXTENDED RELEASE ORAL at 08:39

## 2019-04-27 RX ADMIN — BUSPIRONE HYDROCHLORIDE 10 MG: 10 TABLET ORAL at 20:28

## 2019-04-27 RX ADMIN — TAMSULOSIN HYDROCHLORIDE 0.4 MG: 0.4 CAPSULE ORAL at 20:27

## 2019-04-27 RX ADMIN — LISINOPRIL 10 MG: 10 TABLET ORAL at 08:39

## 2019-04-27 RX ADMIN — BUSPIRONE HYDROCHLORIDE 10 MG: 10 TABLET ORAL at 08:39

## 2019-04-27 RX ADMIN — AMLODIPINE BESYLATE 10 MG: 10 TABLET ORAL at 08:38

## 2019-04-27 RX ADMIN — PANTOPRAZOLE SODIUM 40 MG: 40 TABLET, DELAYED RELEASE ORAL at 06:17

## 2019-04-27 RX ADMIN — BUSPIRONE HYDROCHLORIDE 10 MG: 10 TABLET ORAL at 16:51

## 2019-04-27 RX ADMIN — FOLIC ACID 1 MG: 1 TABLET ORAL at 08:39

## 2019-04-27 RX ADMIN — ERGOCALCIFEROL SOLN 200 MCG/ML (8000 UNIT/ML) 2000 UNITS: 8000 SOLUTION at 08:38

## 2019-04-27 RX ADMIN — MELATONIN 3 MG: at 20:27

## 2019-04-27 RX ADMIN — CYCLOBENZAPRINE HYDROCHLORIDE 10 MG: 10 TABLET, FILM COATED ORAL at 20:27

## 2019-04-27 RX ADMIN — POLYETHYLENE GLYCOL 3350 17 G: 17 POWDER, FOR SOLUTION ORAL at 08:39

## 2019-04-28 PROCEDURE — 99232 SBSQ HOSP IP/OBS MODERATE 35: CPT | Performed by: NURSE PRACTITIONER

## 2019-04-28 PROCEDURE — 97166 OT EVAL MOD COMPLEX 45 MIN: CPT

## 2019-04-28 RX ADMIN — RISPERIDONE 0.5 MG: 0.5 TABLET ORAL at 20:26

## 2019-04-28 RX ADMIN — FOLIC ACID 1 MG: 1 TABLET ORAL at 08:34

## 2019-04-28 RX ADMIN — BUSPIRONE HYDROCHLORIDE 10 MG: 10 TABLET ORAL at 17:16

## 2019-04-28 RX ADMIN — CYCLOBENZAPRINE HYDROCHLORIDE 10 MG: 10 TABLET, FILM COATED ORAL at 08:33

## 2019-04-28 RX ADMIN — AMLODIPINE BESYLATE 10 MG: 10 TABLET ORAL at 08:33

## 2019-04-28 RX ADMIN — TAMSULOSIN HYDROCHLORIDE 0.4 MG: 0.4 CAPSULE ORAL at 20:25

## 2019-04-28 RX ADMIN — ERGOCALCIFEROL SOLN 200 MCG/ML (8000 UNIT/ML) 2000 UNITS: 8000 SOLUTION at 08:34

## 2019-04-28 RX ADMIN — POLYETHYLENE GLYCOL 3350 17 G: 17 POWDER, FOR SOLUTION ORAL at 08:34

## 2019-04-28 RX ADMIN — LISINOPRIL 10 MG: 10 TABLET ORAL at 08:34

## 2019-04-28 RX ADMIN — VENLAFAXINE HYDROCHLORIDE 75 MG: 75 CAPSULE, EXTENDED RELEASE ORAL at 08:38

## 2019-04-28 RX ADMIN — CYCLOBENZAPRINE HYDROCHLORIDE 10 MG: 10 TABLET, FILM COATED ORAL at 17:16

## 2019-04-28 RX ADMIN — ASPIRIN 81 MG CHEWABLE TABLET 81 MG: 81 TABLET CHEWABLE at 08:33

## 2019-04-28 RX ADMIN — TRAZODONE HYDROCHLORIDE 50 MG: 50 TABLET ORAL at 20:26

## 2019-04-28 RX ADMIN — ATORVASTATIN CALCIUM 10 MG: 10 TABLET, FILM COATED ORAL at 08:33

## 2019-04-28 RX ADMIN — MELATONIN 3 MG: at 20:25

## 2019-04-28 RX ADMIN — CYCLOBENZAPRINE HYDROCHLORIDE 10 MG: 10 TABLET, FILM COATED ORAL at 20:26

## 2019-04-28 RX ADMIN — PANTOPRAZOLE SODIUM 40 MG: 40 TABLET, DELAYED RELEASE ORAL at 06:08

## 2019-04-28 RX ADMIN — BUSPIRONE HYDROCHLORIDE 10 MG: 10 TABLET ORAL at 20:26

## 2019-04-28 RX ADMIN — BUSPIRONE HYDROCHLORIDE 10 MG: 10 TABLET ORAL at 08:34

## 2019-04-29 PROCEDURE — 99232 SBSQ HOSP IP/OBS MODERATE 35: CPT | Performed by: PSYCHIATRY & NEUROLOGY

## 2019-04-29 PROCEDURE — 97535 SELF CARE MNGMENT TRAINING: CPT

## 2019-04-29 RX ORDER — RISPERIDONE 1 MG/1
1 TABLET ORAL NIGHTLY
Status: DISCONTINUED | OUTPATIENT
Start: 2019-04-29 | End: 2019-05-01

## 2019-04-29 RX ORDER — VENLAFAXINE HYDROCHLORIDE 37.5 MG/1
37.5 CAPSULE, EXTENDED RELEASE ORAL DAILY
Status: DISCONTINUED | OUTPATIENT
Start: 2019-04-30 | End: 2019-04-30

## 2019-04-29 RX ORDER — SERTRALINE HYDROCHLORIDE 25 MG/1
25 TABLET, FILM COATED ORAL DAILY
Status: COMPLETED | OUTPATIENT
Start: 2019-04-29 | End: 2019-04-29

## 2019-04-29 RX ADMIN — LISINOPRIL 10 MG: 10 TABLET ORAL at 08:10

## 2019-04-29 RX ADMIN — MELATONIN 3 MG: at 20:17

## 2019-04-29 RX ADMIN — VENLAFAXINE HYDROCHLORIDE 75 MG: 75 CAPSULE, EXTENDED RELEASE ORAL at 08:10

## 2019-04-29 RX ADMIN — BUSPIRONE HYDROCHLORIDE 10 MG: 10 TABLET ORAL at 08:10

## 2019-04-29 RX ADMIN — PANTOPRAZOLE SODIUM 40 MG: 40 TABLET, DELAYED RELEASE ORAL at 06:05

## 2019-04-29 RX ADMIN — CYCLOBENZAPRINE HYDROCHLORIDE 10 MG: 10 TABLET, FILM COATED ORAL at 15:22

## 2019-04-29 RX ADMIN — RISPERIDONE 1 MG: 1 TABLET ORAL at 20:17

## 2019-04-29 RX ADMIN — CYCLOBENZAPRINE HYDROCHLORIDE 10 MG: 10 TABLET, FILM COATED ORAL at 08:10

## 2019-04-29 RX ADMIN — ASPIRIN 81 MG CHEWABLE TABLET 81 MG: 81 TABLET CHEWABLE at 08:10

## 2019-04-29 RX ADMIN — FOLIC ACID 1 MG: 1 TABLET ORAL at 08:10

## 2019-04-29 RX ADMIN — TAMSULOSIN HYDROCHLORIDE 0.4 MG: 0.4 CAPSULE ORAL at 20:17

## 2019-04-29 RX ADMIN — ATORVASTATIN CALCIUM 10 MG: 10 TABLET, FILM COATED ORAL at 08:10

## 2019-04-29 RX ADMIN — BUSPIRONE HYDROCHLORIDE 10 MG: 10 TABLET ORAL at 15:21

## 2019-04-29 RX ADMIN — BUSPIRONE HYDROCHLORIDE 10 MG: 10 TABLET ORAL at 20:17

## 2019-04-29 RX ADMIN — POLYETHYLENE GLYCOL 3350 17 G: 17 POWDER, FOR SOLUTION ORAL at 08:20

## 2019-04-29 RX ADMIN — CYCLOBENZAPRINE HYDROCHLORIDE 10 MG: 10 TABLET, FILM COATED ORAL at 20:17

## 2019-04-29 RX ADMIN — ERGOCALCIFEROL SOLN 200 MCG/ML (8000 UNIT/ML) 2000 UNITS: 8000 SOLUTION at 08:10

## 2019-04-29 RX ADMIN — SERTRALINE 25 MG: 25 TABLET, FILM COATED ORAL at 15:21

## 2019-04-30 LAB — GLUCOSE BLDC GLUCOMTR-MCNC: 106 MG/DL (ref 70–130)

## 2019-04-30 PROCEDURE — 99232 SBSQ HOSP IP/OBS MODERATE 35: CPT | Performed by: PSYCHIATRY & NEUROLOGY

## 2019-04-30 PROCEDURE — 97110 THERAPEUTIC EXERCISES: CPT

## 2019-04-30 PROCEDURE — 97530 THERAPEUTIC ACTIVITIES: CPT

## 2019-04-30 PROCEDURE — 82962 GLUCOSE BLOOD TEST: CPT

## 2019-04-30 RX ADMIN — SERTRALINE HYDROCHLORIDE 50 MG: 50 TABLET ORAL at 08:01

## 2019-04-30 RX ADMIN — BUSPIRONE HYDROCHLORIDE 10 MG: 10 TABLET ORAL at 08:00

## 2019-04-30 RX ADMIN — MELATONIN 3 MG: at 20:09

## 2019-04-30 RX ADMIN — ERGOCALCIFEROL SOLN 200 MCG/ML (8000 UNIT/ML) 2000 UNITS: 8000 SOLUTION at 08:00

## 2019-04-30 RX ADMIN — VENLAFAXINE HYDROCHLORIDE 37.5 MG: 37.5 CAPSULE, EXTENDED RELEASE ORAL at 08:01

## 2019-04-30 RX ADMIN — POLYETHYLENE GLYCOL 3350 17 G: 17 POWDER, FOR SOLUTION ORAL at 09:29

## 2019-04-30 RX ADMIN — RISPERIDONE 1 MG: 1 TABLET ORAL at 20:09

## 2019-04-30 RX ADMIN — TAMSULOSIN HYDROCHLORIDE 0.4 MG: 0.4 CAPSULE ORAL at 20:09

## 2019-04-30 RX ADMIN — CYCLOBENZAPRINE HYDROCHLORIDE 10 MG: 10 TABLET, FILM COATED ORAL at 08:00

## 2019-04-30 RX ADMIN — BUSPIRONE HYDROCHLORIDE 10 MG: 10 TABLET ORAL at 20:09

## 2019-04-30 RX ADMIN — CYCLOBENZAPRINE HYDROCHLORIDE 10 MG: 10 TABLET, FILM COATED ORAL at 20:09

## 2019-04-30 RX ADMIN — FOLIC ACID 1 MG: 1 TABLET ORAL at 08:01

## 2019-04-30 RX ADMIN — LISINOPRIL 10 MG: 10 TABLET ORAL at 08:00

## 2019-04-30 RX ADMIN — PANTOPRAZOLE SODIUM 40 MG: 40 TABLET, DELAYED RELEASE ORAL at 06:03

## 2019-04-30 RX ADMIN — ASPIRIN 81 MG CHEWABLE TABLET 81 MG: 81 TABLET CHEWABLE at 08:01

## 2019-04-30 RX ADMIN — BUSPIRONE HYDROCHLORIDE 10 MG: 10 TABLET ORAL at 16:17

## 2019-04-30 RX ADMIN — ATORVASTATIN CALCIUM 10 MG: 10 TABLET, FILM COATED ORAL at 08:01

## 2019-04-30 RX ADMIN — CYCLOBENZAPRINE HYDROCHLORIDE 10 MG: 10 TABLET, FILM COATED ORAL at 16:17

## 2019-05-01 PROCEDURE — 97110 THERAPEUTIC EXERCISES: CPT

## 2019-05-01 PROCEDURE — 99232 SBSQ HOSP IP/OBS MODERATE 35: CPT | Performed by: PSYCHIATRY & NEUROLOGY

## 2019-05-01 PROCEDURE — 25010000002 ZIPRASIDONE MESYLATE PER 10 MG: Performed by: NURSE PRACTITIONER

## 2019-05-01 PROCEDURE — 97530 THERAPEUTIC ACTIVITIES: CPT

## 2019-05-01 RX ORDER — RISPERIDONE 1 MG/1
1 TABLET ORAL EVERY 12 HOURS SCHEDULED
Status: DISCONTINUED | OUTPATIENT
Start: 2019-05-01 | End: 2019-05-05

## 2019-05-01 RX ORDER — ZIPRASIDONE MESYLATE 20 MG/ML
10 INJECTION, POWDER, LYOPHILIZED, FOR SOLUTION INTRAMUSCULAR DAILY PRN
Status: DISCONTINUED | OUTPATIENT
Start: 2019-05-01 | End: 2019-05-07 | Stop reason: HOSPADM

## 2019-05-01 RX ADMIN — AMLODIPINE BESYLATE 10 MG: 10 TABLET ORAL at 08:02

## 2019-05-01 RX ADMIN — ERGOCALCIFEROL SOLN 200 MCG/ML (8000 UNIT/ML) 2000 UNITS: 8000 SOLUTION at 08:02

## 2019-05-01 RX ADMIN — LISINOPRIL 10 MG: 10 TABLET ORAL at 08:03

## 2019-05-01 RX ADMIN — POLYETHYLENE GLYCOL 3350 17 G: 17 POWDER, FOR SOLUTION ORAL at 08:19

## 2019-05-01 RX ADMIN — FOLIC ACID 1 MG: 1 TABLET ORAL at 08:03

## 2019-05-01 RX ADMIN — TAMSULOSIN HYDROCHLORIDE 0.4 MG: 0.4 CAPSULE ORAL at 20:47

## 2019-05-01 RX ADMIN — PANTOPRAZOLE SODIUM 40 MG: 40 TABLET, DELAYED RELEASE ORAL at 06:05

## 2019-05-01 RX ADMIN — CYCLOBENZAPRINE HYDROCHLORIDE 10 MG: 10 TABLET, FILM COATED ORAL at 08:18

## 2019-05-01 RX ADMIN — ATORVASTATIN CALCIUM 10 MG: 10 TABLET, FILM COATED ORAL at 08:02

## 2019-05-01 RX ADMIN — RISPERIDONE 1 MG: 1 TABLET ORAL at 20:48

## 2019-05-01 RX ADMIN — SERTRALINE HYDROCHLORIDE 50 MG: 50 TABLET ORAL at 08:02

## 2019-05-01 RX ADMIN — MELATONIN 3 MG: at 20:47

## 2019-05-01 RX ADMIN — CYCLOBENZAPRINE HYDROCHLORIDE 10 MG: 10 TABLET, FILM COATED ORAL at 20:47

## 2019-05-01 RX ADMIN — BUSPIRONE HYDROCHLORIDE 10 MG: 10 TABLET ORAL at 08:02

## 2019-05-01 RX ADMIN — ZIPRASIDONE MESYLATE 10 MG: 20 INJECTION, POWDER, LYOPHILIZED, FOR SOLUTION INTRAMUSCULAR at 17:02

## 2019-05-01 RX ADMIN — ASPIRIN 81 MG CHEWABLE TABLET 81 MG: 81 TABLET CHEWABLE at 08:02

## 2019-05-01 NOTE — PLAN OF CARE
Problem: Skin Injury Risk (Adult)  Goal: Identify Related Risk Factors and Signs and Symptoms  Outcome: Outcome(s) achieved Date Met: 05/01/19      Problem: Fall Risk (Adult)  Goal: Identify Related Risk Factors and Signs and Symptoms  Outcome: Outcome(s) achieved Date Met: 05/01/19      Problem: Violence, Risk/Actual (Adult)  Goal: Identify Related Risk Factors and Signs and Symptoms  Outcome: Outcome(s) achieved Date Met: 05/01/19

## 2019-05-01 NOTE — NURSING NOTE
"Patient refused risperdal stating, \"I've been trying to stay alive all week because these sneaky people are trying to kill me.\" Patient states he is talking about staff here and at his SNF.  "

## 2019-05-01 NOTE — NURSING NOTE
"Pt displayed aggressive and disruptive behavior. He was observed blocking pathway to nurses station. [employer] asked him to relocate. He refused. His reasoning was undeterminable. He continued to refuse. This nurse asked him if there was something that he was wanting. He stated that \"I just told ya\". After repeated attempts to calm patient, this nurse moved patient in wheelchair.   "
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
## 2019-05-01 NOTE — NURSING NOTE
Behavior   Note any precipitants to event or behavior   Describe level and action of any aggressive behavior or speech and associated interventions.     Anxiety: Restless/Edgy  Depression: Patient denies at this time  Pain  0  AVH   no  S/I   no  Plan  no  H/I   no  Plan  no    Affect   flat      Note:  Pt denied AVH, SI, and HI to this nurse during morning assessment. It should be noted that he did not respond well during the assessment. He refused his medicines at first, insisting he should only be taking two pills. A female staff member talked with patient, and he agreed to take medicines.       Intervention    PRN medication utilized:  no    Instructed in medication usage and effects  Medications administered as ordered  Encouraged to verbalize needs      Response    Verbalized understanding   Did patient take medications as ordered uncooperative at first, responded to female staff better  Did patient interact with assessment?  appeared resistive. limited dialogue    Plan    Will monitor for safety  Will monitor every 15 minutes as ordered  Will evaluate and promote the plan of care    Last BM:  April 30, 2019  (Please chart in I/O as well)
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
## 2019-05-01 NOTE — SIGNIFICANT NOTE
Per [facility],     05/01/19 1119   Rehab Treatment   Discipline occupational therapy assistant   Reason Treatment Not Performed other (see comments)    pt is causing a disturbance and not appropriate to see at this time.

## 2019-05-01 NOTE — PLAN OF CARE
Problem: Patient Care Overview  Goal: Plan of Care Review  Outcome: Ongoing (interventions implemented as appropriate)    Goal: Individualization and Mutuality  Outcome: Ongoing (interventions implemented as appropriate)    Goal: Discharge Needs Assessment  Outcome: Ongoing (interventions implemented as appropriate)    Goal: Interprofessional Rounds/Family Conf  Outcome: Ongoing (interventions implemented as appropriate)      Problem: Overarching Goals (Adult)  Goal: Adheres to Safety Considerations for Self and Others  Outcome: Ongoing (interventions implemented as appropriate)    Goal: Optimized Coping Skills in Response to Life Stressors  Outcome: Ongoing (interventions implemented as appropriate)    Goal: Develops/Participates in Therapeutic [place] to Support Successful Transition  Outcome: Ongoing (interventions implemented as appropriate)      Problem: Skin Injury Risk (Adult)  Goal: Identify Related Risk Factors and Signs and Symptoms  Outcome: Ongoing (interventions implemented as appropriate)    Goal: Skin Health and Integrity  Outcome: Ongoing (interventions implemented as appropriate)      Problem: Fall Risk (Adult)  Goal: Identify Related Risk Factors and Signs and Symptoms  Outcome: Ongoing (interventions implemented as appropriate)    Goal: Absence of Fall  Outcome: Ongoing (interventions implemented as appropriate)      Problem: Violence, Risk/Actual (Adult)  Goal: Identify Related Risk Factors and Signs and Symptoms  Outcome: Ongoing (interventions implemented as appropriate)    Goal: Impulse Control  Outcome: Ongoing (interventions implemented as appropriate)    Goal: Anger Control  Outcome: Ongoing (interventions implemented as appropriate)

## 2019-05-01 NOTE — THERAPY TREATMENT NOTE
Acute Care - Physical Therapy Treatment Note  [facility]     Patient Name: Sg Vega  : 1965  MRN: [id number]  Today's Date: 2019  Onset of Illness/Injury or Date of Surgery: 19  Date of Referral to PT: 19  Referring Physician: FERNANDO Gutierrez    Admit Date: 2019    Visit Dx:    ICD-10-CM ICD-9-CM   1. Oral phase dysphagia R13.11 787.21   2. Impaired functional mobility, balance, and endurance Z74.09 V49.89   3. 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 Stated pt was being confrontational earlier but is doing much better this pm.  -TW      Therapy Frequency (OT Eval)  -- 5-7 days/wk  -TW      Patient Effort  good  -TW      Existing Precautions/Restrictions  fall;other (see comments) behavioral aggression, paranoia  -TW      Recorded by [TW] Suleman Kohli PTA 19 1556      Row Name 19 1438             Vital Signs    Pretreatment Heart Rate (beats/min)  84  -TW      Posttreatment Heart Rate (beats/min)  88  -TW      Pre SpO2 (%)  97  -TW      O2 Delivery Pre Treatment  room air  -TW      Post SpO2 (%)  97  -TW      Pre Patient Position  Sitting  -TW      Intra Patient Position  Standing  -TW      Post Patient Position  Sitting  -TW      Recorded by [TW] Suleman Kohli PTA 19 1556      Row Name 19  1438             Cognitive Assessment/Intervention- PT/OT    Affect/Mental Status (Cognitive)  flat/blunted affect;confused  -TW      Orientation Status (Cognition)  oriented to;person;disoriented to;place;situation;time  -TW      Follows Commands (Cognition)  follows one step commands;25-49% accuracy;repetition of directions required;verbal cues/prompting required  -TW      Cognitive Function (Cognitive)  safety deficit  -TW      Personal Safety Interventions  fall prevention program maintained;gait belt;nonskid shoes/slippers when out of bed  -TW      Recorded by [TW] Suleman Kohli, [facility] 05/01/19 1556      Row Name 05/01/19 1438             Bed Mobility Assessment/Treatment    Supine-Sit Coal (Bed Mobility)  not tested  -TW      Sit-Supine Coal (Bed Mobility)  not tested  -TW      Comment (Bed Mobility)  Pt already up in w/c and was left in w/c after tx in common area within sight of [facility] staff.  -TW      Recorded by [TW] Suleman Kohli, [facility] 05/01/19 1556      Row Name 05/01/19 1438             Transfer Assessment/Treatment    Transfer Assessment/Treatment  sit-stand transfer;stand-sit transfer  -TW      Recorded by [TW] Suleman Kohli, [facility] 05/01/19 1556      Row Name 05/01/19 1438             Sit-Stand Transfer    Sit-Stand Coal (Transfers)  minimum assist (75% patient effort);moderate assist (50% patient effort)  -TW      Assistive Device (Sit-Stand Transfers)  -- wall railing  -TW      Recorded by [TW] Suleman Kohli, [facility] 05/01/19 1556      Row Name 05/01/19 1438             Stand-Sit Transfer    Stand-Sit Coal (Transfers)  minimum assist (75% patient effort);moderate assist (50% patient effort)  -TW      Assistive Device (Stand-Sit Transfers)  -- wall railing.  -TW      Recorded by [TW] Suleman Kohli PTA 05/01/19 1556      Row Name 05/01/19 1438             Gait/Stairs Assessment/Training    Coal Level (Gait)  moderate assist (50% patient  effort);maximum assist (25% patient effort)  -TW      Assistive Device (Gait)  -- HR on the wall  -TW      Distance in Feet (Gait)  4ft  -TW      Deviations/Abnormal Patterns (Gait)  base of support, narrow;scissoring  -TW      Comment (Gait/Stairs)  Pt extremely unstable throughout gait. Pt required max assist at times to control his balance.  -TW      Recorded by [TW] Suleman Kohli PTA 05/01/19 1556      Row Name 05/01/19 1438             Therapeutic Exercise    Lower Extremity (Therapeutic Exercise)  LAQ (long arc quad), bilateral;marching while seated  -TW      Lower Extremity Range of Motion (Therapeutic Exercise)  ankle dorsiflexion/plantar flexion, bilateral;hip abduction/adduction, bilateral  -TW      Exercise Type (Therapeutic Exercise)  AROM (active range of motion);AAROM (active assistive range of motion) AAROM needed to reach Full available ROM.  -TW      Position (Therapeutic Exercise)  seated  -TW      Sets/Reps (Therapeutic Exercise)  1/10-15  -TW      Recorded by [TW] Suleman Kohli PTA 05/01/19 1556      Row Name 05/01/19 1438             Positioning and Restraints    Pre-Treatment Position  sitting in chair/recliner  -TW      Post Treatment Position  wheelchair  -TW      In Wheelchair  sitting;encouraged to call for assist;patient within staff view  -TW      Recorded by [TW] Suleman Kohli PTA 05/01/19 1556      Row Name 05/01/19 1438             Pain Scale: Numbers Pre/Post-Treatment    Pain Scale: Numbers, Pretreatment  0/10 - no pain  -TW      Pain Scale: Numbers, Post-Treatment  0/10 - no pain  -TW      Recorded by [TW] Suleman Kohli PTA 05/01/19 1556      Row Name 05/01/19 1438             Sensory Assessment/Intervention    Sensory General Assessment  --  -TW      Recorded by [TW] Suleman Kohli PTA 05/01/19 1556      Row Name 05/01/19 1438             Outcome Summary/Treatment Plan (OT)    Anticipated Discharge Disposition (OT)  --  -TW      Recorded by [TW]  SeunSuleman, PTA 05/01/19 1556      Row Name 05/01/19 1438             Outcome Summary/Treatment Plan (PT)    Daily Summary of Progress (PT)  progress towards functional goals is fair  -TW      Plan for Continued Treatment (PT)  Cont with mobility  -TW      Anticipated Discharge Disposition (PT)  skilled nursing facility  -TW      Recorded by [TW] Seun Suleman PARKS, PTA 05/01/19 1556        User Key  (r) = Recorded By, (t) = Taken By, (c) = Cosigned By    Initials Name Effective Dates Discipline    TW Seun Suleman KASEY, PTA 03/07/18 -  PT               Rehab Goal Summary     Row Name 05/01/19 1438             Physical Therapy Goals    Bed Mobility Goal Selection (PT)  bed mobility, PT goal 1  -TW      Transfer Goal Selection (PT)  transfer, PT goal 1  -TW      Gait Training Goal Selection (PT)  gait training, PT goal 1  -TW         Bed Mobility Goal 1 (PT)    Activity/Assistive Device (Bed Mobility Goal 1, PT)  sit to supine;supine to sit;scooting  -TW      Gilbert Level/Cues Needed (Bed Mobility Goal 1, PT)  standby assist  -TW      Time Frame (Bed Mobility Goal 1, PT)  by discharge  -TW      Progress/Outcomes (Bed Mobility Goal 1, PT)  goal not met  -TW         Transfer Goal 1 (PT)    Activity/Assistive Device (Transfer Goal 1, PT)  bed-to-chair/chair-to-bed  -TW      Gilbert Level/Cues Needed (Transfer Goal 1, PT)  contact guard assist  -TW      Time Frame (Transfer Goal 1, PT)  by discharge  -TW      Progress/Outcome (Transfer Goal 1, PT)  goal not met  -TW         Gait Training Goal 1 (PT)    Activity/Assistive Device (Gait Training Goal 1, PT)  gait (walking locomotion);walker, rolling  -TW      Gilbert Level (Gait Training Goal 1, PT)  contact guard assist  -TW      Distance (Gait Goal 1, PT)  15' x 1  -TW      Time Frame (Gait Training Goal 1, PT)  by discharge  -TW      Barriers (Gait Training Goal 1, PT)  MS, agitation  -TW      Progress/Outcome (Gait Training Goal 1, PT)  goal  not met  -TW        User Key  (r) = Recorded By, (t) = Taken By, (c) = Cosigned By    Initials Name Provider Type Discipline    TW Suleman Kohli, PTA Physical Therapy Assistant PT              PT Recommendation and Plan  Anticipated Discharge Disposition (PT): skilled nursing facility  Outcome Summary/Treatment Plan (PT)  Daily Summary of Progress (PT): progress towards functional goals is fair  Plan for Continued Treatment (PT): Cont with mobility  Anticipated Discharge Disposition (PT): skilled nursing facility  Plan of Care Reviewed With: patient  Progress: no change  Outcome Summary: Pt cont to require Mod/Max of 1 for gait and Mod of 1 for t/f surf to surf. Pt requires frequent redirection during seated B LE ther ex but was able to complete 10-15 reps of each. Pt will need SNF upon DC.  Outcome Measures     Row Name 05/01/19 1438 04/30/19 1600 04/30/19 1302       How much help from another person do you currently need...    Turning from your back to your side while in flat bed without using bedrails?  3  -TW  3  -TA  --    Moving from lying on back to sitting on the side of a flat bed without bedrails?  3  -TW  3  -TA  --    Moving to and from a bed to a chair (including a wheelchair)?  2  -TW  2  -TA  --    Standing up from a chair using your arms (e.g., wheelchair, bedside chair)?  2  -TW  2  -TA  --    Climbing 3-5 steps with a railing?  2  -TW  2  -TA  --    To walk in hospital room?  2  -TW  2  -TA  --    AM-PAC 6 Clicks Score  14  -TW  14  -TA  --       How much help from another is currently needed...    Putting on and taking off regular lower body clothing?  --  --  3  -BB    Bathing (including washing, rinsing, and drying)  --  --  3  -BB    Toileting (which includes using toilet bed pan or urinal)  --  --  3  -BB    Putting on and taking off regular upper body clothing  --  --  3  -BB    Taking care of personal grooming (such as brushing teeth)  --  --  3  -BB    Eating meals  --  --  3  -BB     Score  --  --  18  -BB       Functional Assessment    Outcome Measure Options  AM-PAC 6 Clicks Basic Mobility (PT)  -TW  AM-PAC 6 Clicks Basic Mobility (PT)  -TA  --    Row Name 04/29/19 1235             How much help from another is currently needed...    Putting on and taking off regular lower body clothing?  3  -BB      Bathing (including washing, rinsing, and drying)  3  -BB      Toileting (which includes using toilet bed pan or urinal)  3  -BB      Putting on and taking off regular upper body clothing  3  -BB      Taking care of personal grooming (such as brushing teeth)  3  -BB      Eating meals  3  -BB      Score  18  -BB        User Key  (r) = Recorded By, (t) = Taken By, (c) = Cosigned By    Initials Name Provider Type    TA Kaur Mitchell PTA Physical Therapy Assistant    TW Suleman Kohli PTA Physical Therapy Assistant    BB Netta Robles, RESENDEZ/L Occupational Therapy Assistant         Time Calculation:   PT Charges     Row Name 05/01/19 1558             Time Calculation    Start Time  1438  -TW      Stop Time  1511  -TW      Time Calculation (min)  33 min  -TW      PT Received On  05/01/19  -TW      PT Goal Re-Cert Due Date  05/10/19  -TW         Time Calculation- PT    Total Timed Code Minutes- PT  33 minute(s)  -TW        User Key  (r) = Recorded By, (t) = Taken By, (c) = Cosigned By    Initials Name Provider Type    TW Suleman Kohli PTA Physical Therapy Assistant        Therapy Charges for Today     Code Description Service Date Service Provider Modifiers Qty    43748212564 HC PT THERAPEUTIC ACT EA 15 MIN 5/1/2019 Suleman Kohli, KAL GP 1    79323069773 HC PT THER PROC EA 15 MIN 5/1/2019 Suleman Kohli, KAL GP 1          PT G-Codes  Outcome Measure Options: AM-PAC 6 Clicks Basic Mobility (PT)  AM-PAC 6 Clicks Score: 14  Score: 18    Suleman Kohli PTA  5/1/2019

## 2019-05-01 NOTE — PLAN OF CARE
Problem: Patient Care Overview  Goal: Plan of Care Review  Outcome: Ongoing (interventions implemented as appropriate)   05/01/19 4608   Coping/Psychosocial   Plan of Care Reviewed With patient   Coping/Psychosocial   Patient Agreement with Plan of Care agrees   Plan of Care Review   Progress no change   OTHER   Outcome Summary Pt cont to require Mod/Max of 1 for gait and Mod of 1 for t/f surf to surf. Pt requires frequent redirection during seated B LE ther ex but was able to complete 10-15 reps of each. Pt will need SNF upon DC.
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
## 2019-05-01 NOTE — NURSING NOTE
Behavior   Pt sleeping. Patient appears to have slept all night.    Intervention  Safety Rounds complete    Response  Pt sleeping    Plan  Continue current plan
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
## 2019-05-01 NOTE — NURSING NOTE
Behavior   Note any precipitants to event or behavior   Describe level and action of any aggressive behavior or speech and associated interventions.     Anxiety: Restless/Edgy  Depression: depressed mood and impaired memory  Pain  0  AVH   no  S/I   no  Plan  no  H/I   no  Plan  no    Affect   flat      Note:Patient sitting in dining area. Patient said his day was good today. Patient is compliant with medications. Patient has been cooperative and interacting with peers. No complaints of pain or discomfort at this time.       Intervention    PRN medication utilized:  no    Instructed in medication usage and effects  Medications administered as ordered  Encouraged to verbalize needs      Response    Verbalized understanding   Did patient take medications as ordered yes   Did patient interact with assessment?  yes     Plan    Will monitor for safety  Will monitor every 15 minutes as ordered  Will evaluate and promote the plan of care    Last BM:  unknown date  (Please chart in I/O as well)

## 2019-05-01 NOTE — PLAN OF CARE
Problem: Patient Care Overview  Goal: Plan of Care Review  Outcome: Ongoing (interventions implemented as appropriate)    Goal: Individualization and Mutuality  Outcome: Ongoing (interventions implemented as appropriate)    Goal: Discharge Needs Assessment  Outcome: Ongoing (interventions implemented as appropriate)    Goal: Interprofessional Rounds/Family Conf  Outcome: Ongoing (interventions implemented as appropriate)      Problem: Overarching Goals (Adult)  Goal: Adheres to Safety Considerations for Self and Others  Outcome: Ongoing (interventions implemented as appropriate)    Goal: Optimized Coping Skills in Response to Life Stressors  Outcome: Ongoing (interventions implemented as appropriate)    Goal: Develops/Participates in Therapeutic [place] to Support Successful Transition  Outcome: Ongoing (interventions implemented as appropriate)      Problem: Skin Injury Risk (Adult)  Goal: Identify Related Risk Factors and Signs and Symptoms  Outcome: Ongoing (interventions implemented as appropriate)    Goal: Skin Health and Integrity  Outcome: Ongoing (interventions implemented as appropriate)      Problem: Fall Risk (Adult)  Goal: Identify Related Risk Factors and Signs and Symptoms  Outcome: Ongoing (interventions implemented as appropriate)    Goal: Absence of Fall  Outcome: Ongoing (interventions implemented as appropriate)      Problem: Violence, Risk/Actual (Adult)  Goal: Identify Related Risk Factors and Signs and Symptoms  Outcome: Ongoing (interventions implemented as appropriate)    Goal: Impulse Control  Outcome: Ongoing (interventions implemented as appropriate)    Goal: Anger Control  Outcome: Ongoing (interventions implemented as appropriate)

## 2019-05-01 NOTE — SIGNIFICANT NOTE
Post hour follow-up: pt cooperative, calm and ate his evening meal. B/p 102/60 with pulse 82, respirations 18.

## 2019-05-02 PROCEDURE — 97530 THERAPEUTIC ACTIVITIES: CPT

## 2019-05-02 PROCEDURE — 97110 THERAPEUTIC EXERCISES: CPT

## 2019-05-02 PROCEDURE — 99232 SBSQ HOSP IP/OBS MODERATE 35: CPT | Performed by: PSYCHIATRY & NEUROLOGY

## 2019-05-02 RX ADMIN — CYCLOBENZAPRINE HYDROCHLORIDE 10 MG: 10 TABLET, FILM COATED ORAL at 16:06

## 2019-05-02 RX ADMIN — TRAZODONE HYDROCHLORIDE 50 MG: 50 TABLET ORAL at 20:54

## 2019-05-02 RX ADMIN — CYCLOBENZAPRINE HYDROCHLORIDE 10 MG: 10 TABLET, FILM COATED ORAL at 20:54

## 2019-05-02 RX ADMIN — MELATONIN 3 MG: at 20:54

## 2019-05-02 RX ADMIN — SERTRALINE HYDROCHLORIDE 50 MG: 50 TABLET ORAL at 08:16

## 2019-05-02 RX ADMIN — FOLIC ACID 1 MG: 1 TABLET ORAL at 08:16

## 2019-05-02 RX ADMIN — POLYETHYLENE GLYCOL 3350 17 G: 17 POWDER, FOR SOLUTION ORAL at 08:23

## 2019-05-02 RX ADMIN — AMLODIPINE BESYLATE 10 MG: 10 TABLET ORAL at 08:15

## 2019-05-02 RX ADMIN — LISINOPRIL 10 MG: 10 TABLET ORAL at 08:15

## 2019-05-02 RX ADMIN — ATORVASTATIN CALCIUM 10 MG: 10 TABLET, FILM COATED ORAL at 08:16

## 2019-05-02 RX ADMIN — ASPIRIN 81 MG CHEWABLE TABLET 81 MG: 81 TABLET CHEWABLE at 08:16

## 2019-05-02 RX ADMIN — RISPERIDONE 1 MG: 1 TABLET ORAL at 20:54

## 2019-05-02 RX ADMIN — ERGOCALCIFEROL SOLN 200 MCG/ML (8000 UNIT/ML) 2000 UNITS: 8000 SOLUTION at 08:16

## 2019-05-02 RX ADMIN — CYCLOBENZAPRINE HYDROCHLORIDE 10 MG: 10 TABLET, FILM COATED ORAL at 08:15

## 2019-05-02 RX ADMIN — RISPERIDONE 1 MG: 1 TABLET ORAL at 08:15

## 2019-05-02 RX ADMIN — PANTOPRAZOLE SODIUM 40 MG: 40 TABLET, DELAYED RELEASE ORAL at 06:01

## 2019-05-02 RX ADMIN — TAMSULOSIN HYDROCHLORIDE 0.4 MG: 0.4 CAPSULE ORAL at 20:54

## 2019-05-02 NOTE — NURSING NOTE
Behavior   Pt sleeping. Patient has slept all night except when he had his shower.    Intervention  Safety Rounds complete    Response  Pt sleeping    Plan  Continue current plan

## 2019-05-02 NOTE — NURSING NOTE
Behavior   Note any precipitants to event or behavior   Describe level and action of any aggressive behavior or speech and associated interventions.     Anxiety: Patient denies at this time  Depression: Patient denies at this time  Pain  0  AVH   no  S/I   no  Plan  no  H/I   no  Plan  no    Affect   flat      Note:Patient sleeping when signee entered room. Patient compliant with night medications and cooperative. Patient denied S/I, H/I and AVH.      Intervention    PRN medication utilized:  no    Instructed in medication usage and effects  Medications administered as ordered  Encouraged to verbalize needs      Response    Verbalized understanding   Did patient take medications as ordered yes   Did patient interact with assessment?  yes     Plan    Will monitor for safety  Will monitor every 15 minutes as ordered  Will evaluate and promote the plan of care    Last BM:  May 1, 2019  (Please chart in I/O as well)

## 2019-05-02 NOTE — THERAPY TREATMENT NOTE
Acute Care - Physical Therapy Treatment Note  [facility]     Patient Name: Sg Vega  : 1965  MRN: [id number]  Today's Date: 2019  Onset of Illness/Injury or Date of Surgery: 19  Date of Referral to PT: 19  Referring Physician: FERNANDO Gutierrez    Admit Date: 2019    Visit Dx:    ICD-10-CM ICD-9-CM   1. Oral phase dysphagia R13.11 787.21   2. Impaired functional mobility, balance, and endurance Z74.09 V49.89   3. 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   -TW  Nsg Olayed tx  -BB     Total Minutes, Occupational Therapy Treatment  --  25  -BB     Therapy Frequency (OT Eval)  --  other (see comments) 5-7 days/wk  -BB     Patient Effort  adequate  -TW  good  -BB     Existing Precautions/Restrictions  fall;other (see comments) behavioral aggression, paranoia  -TW  fall;other (see comments) behavioral aggression, paranoia  -BB     Recorded by [TW] Suleman Kohli, KAL 19 1502 [BB] Netta Robles COTA/L 19 1254     Row Name 19 1020             Vital Signs    Pre Systolic BP Rehab  109  -TW      Pre Treatment Diastolic BP  63  -TW      Pretreatment Heart Rate (beats/min)  64   -TW      Posttreatment Heart Rate (beats/min)  68  -TW      Pre SpO2 (%)  96  -TW      O2 Delivery Pre Treatment  room air  -TW      Post SpO2 (%)  96  -TW      Pre Patient Position  Sitting  -TW      Intra Patient Position  Standing  -TW      Post Patient Position  Sitting  -TW      Recorded by [TW] Suleman Kohli PTA 05/02/19 1502      Row Name 05/02/19 1020 05/02/19 0825          Cognitive Assessment/Intervention- PT/OT    Affect/Mental Status (Cognitive)  flat/blunted affect  -TW  flat/blunted affect  -BB     Orientation Status (Cognition)  oriented to;person  -TW  oriented to;person  -BB     Follows Commands (Cognition)  follows one step commands;25-49% accuracy  -TW  follows one step commands;25-49% accuracy  -BB     Cognitive Function (Cognitive)  safety deficit  -TW  --     Personal Safety Interventions  fall prevention program maintained;gait belt;nonskid shoes/slippers when out of bed  -TW  fall prevention program maintained;gait belt;muscle strengthening facilitated;nonskid shoes/slippers when out of bed  -BB     Recorded by [TW] Suleman Kohli PTA 05/02/19 1502 [BB] Netta Robles COTA/L 05/02/19 1254     Row Name 05/02/19 1020             Bed Mobility Assessment/Treatment    Comment (Bed Mobility)  Pt already up in w/c when arrived to floor.  -TW      Recorded by [TW] Suleman Kohli PTA 05/02/19 1502      Row Name 05/02/19 1020             Sit-Stand Transfer    Sit-Stand [place] (Transfers)  minimum assist (75% patient effort)  -TW      Assistive Device (Sit-Stand Transfers)  -- wall railing  -TW      Recorded by [TW] Suleman Kohli PTA 05/02/19 1502      Row Name 05/02/19 1020             Stand-Sit Transfer    Stand-Sit [place] (Transfers)  minimum assist (75% patient effort)  -TW      Assistive Device (Stand-Sit Transfers)  -- wall railing  -TW      Recorded by [TW] Suleman Kohli PTA 05/02/19 1502      Row Name 05/02/19 1020             Gait/Stairs  Assessment/Training    [place] Level (Gait)  moderate assist (50% patient effort);maximum assist (25% patient effort)  -TW      Assistive Device (Gait)  -- wall railing   -TW      Distance in Feet (Gait)  4ft  -TW      Deviations/Abnormal Patterns (Gait)  base of support, narrow;steppage;scissoring  -TW      Comment (Gait/Stairs)  Pt again is extremely unsteady during standing and gait.  -TW      Recorded by [TW] Suleman Kohli PTA 05/02/19 1502      Row Name 05/02/19 0825             Therapeutic Exercise    Upper Extremity Range of Motion (Therapeutic Exercise)  shoulder flexion/extension, bilateral;elbow flexion/extension, bilateral;forearm supination/pronation, bilateral;wrist flexion/extension, bilateral;shoulder internal/external rotation, bilateral L shoulder ROM limited  -BB      Hand (Therapeutic Exercise)  finger flexion/extension, bilateral  -BB      Exercise Type (Therapeutic Exercise)  AROM (active range of motion)  -BB      Position (Therapeutic Exercise)  seated  -BB      Sets/Reps (Therapeutic Exercise)  1x10  -BB      Expected Outcome (Therapeutic Exercise)  improve functional tolerance, self-care activity;improve performance, transfer skills;improve functional stability  -BB      Recorded by [BB] Netta Robles COTA/L 05/02/19 1254      Row Name 05/02/19 1020 05/02/19 0825          Positioning and Restraints    Pre-Treatment Position  sitting in chair/recliner  -TW  sitting in chair/recliner  -BB     Post Treatment Position  wheelchair  -TW  wheelchair  -BB     In Wheelchair  sitting;encouraged to call for assist;patient within staff view  -TW  sitting;encouraged to call for assist within staff view  -BB     Recorded by [TW] Suleman Kohli PTA 05/02/19 1502 [BB] Netta Robles COTA/L 05/02/19 1254     Row Name 05/02/19 0825             Pain Scale: Numbers Pre/Post-Treatment    Pain Scale: Numbers, Pretreatment  0/10 - no pain  -BB      Pain Scale: Numbers, Post-Treatment   0/10 - no pain  -BB      Recorded by [BB] Netta Robles COTA/L 05/02/19 1254      Row Name 05/02/19 0825             Plan of Care Review    Plan of Care Reviewed With  patient  -BB      Recorded by [BB] Netta Robles COTA/L 05/02/19 1254      Row Name 05/02/19 0825             Outcome Summary/Treatment Plan (OT)    Daily Summary of Progress (OT)  progress towards functional goals is fair  -BB      Plan for Continued Treatment (OT)  continue POC  -BB      Anticipated Discharge Disposition (OT)  skilled nursing facility  -BB      Recorded by [BB] Netta Robles COTA/L 05/02/19 1254      Row Name 05/02/19 1020             Outcome Summary/Treatment Plan (PT)    Daily Summary of Progress (PT)  progress towards functional goals is fair  -TW      Plan for Continued Treatment (PT)  Cont with mobility   -TW      Anticipated Discharge Disposition (PT)  skilled nursing facility  -TW      Recorded by [TW] Suleman Kohli PTA 05/02/19 1502        User Key  (r) = Recorded By, (t) = Taken By, (c) = Cosigned By    Initials Name Effective Dates Discipline    TW Suleman Kohli, PTA 03/07/18 -  PT    BB Netta Robles COTA/L 03/07/18 -  OT               Rehab Goal Summary     Row Name 05/02/19 1020 05/02/19 0825          Physical Therapy Goals    Bed Mobility Goal Selection (PT)  bed mobility, PT goal 1  -TW  --     Transfer Goal Selection (PT)  transfer, PT goal 1  -TW  --     Gait Training Goal Selection (PT)  gait training, PT goal 1  -TW  --        Bed Mobility Goal 1 (PT)    Activity/Assistive Device (Bed Mobility Goal 1, PT)  sit to supine;supine to sit;scooting  -TW  --     [place] Level/Cues Needed (Bed Mobility Goal 1, PT)  standby assist  -TW  --     Time Frame (Bed Mobility Goal 1, PT)  by discharge  -TW  --     Progress/Outcomes (Bed Mobility Goal 1, PT)  goal not met  -TW  --        Transfer Goal 1 (PT)    Activity/Assistive Device (Transfer Goal 1, PT)  bed-to-chair/chair-to-bed   -TW  --     [place] Level/Cues Needed (Transfer Goal 1, PT)  contact guard assist  -TW  --     Time Frame (Transfer Goal 1, PT)  by discharge  -TW  --     Progress/Outcome (Transfer Goal 1, PT)  goal not met  -TW  --        Gait Training Goal 1 (PT)    Activity/Assistive Device (Gait Training Goal 1, PT)  gait (walking locomotion);walker, rolling  -TW  --     [place] Level (Gait Training Goal 1, PT)  contact guard assist  -TW  --     Distance (Gait Goal 1, PT)  15' x 1  -TW  --     Time Frame (Gait Training Goal 1, PT)  by discharge  -TW  --     Barriers (Gait Training Goal 1, PT)  MS, agitation  -TW  --     Progress/Outcome (Gait Training Goal 1, PT)  goal not met  -TW  --        Occupational Therapy Goals    Transfer Goal Selection (OT)  --  transfer, OT goal 1  -BB     Toileting Goal Selection (OT)  --  toileting, OT goal 1  -BB     Self-Feeding Goal Selection (OT)  --  self feeding, OT goal 1  -BB     Strength Goal Selection (OT)  --  strength, OT goal 1  -BB     Coordination Goal Selection (OT)  --  coordination, OT goal 1  -BB        Transfer Goal 1 (OT)    Activity/Assistive Device (Transfer Goal 1, OT)  --  sit-to-stand/stand-to-sit;bed-to-chair/chair-to-bed;toilet;walker, rolling  -BB     [place] Level/Cues Needed (Transfer Goal 1, OT)  --  contact guard assist  -BB     Time Frame (Transfer Goal 1, OT)  --  long term goal (LTG);by discharge  -BB     Progress/Outcome (Transfer Goal 1, OT)  --  goal not met  -BB        Toileting Goal 1 (OT)    Activity/Device (Toileting Goal 1, OT)  --  toileting skills, all  -BB     [place] Level/Cues Needed (Toileting Goal 1, OT)  --  contact guard assist  -BB     Time Frame (Toileting Goal 1, OT)  --  long term goal (LTG);by discharge  -BB     Progress/Outcome (Toileting Goal 1, OT)  --  goal not met  -BB        Self-Feeding Goal 1 (OT)    Activity/Assistive Device (Self-Feeding Goal 1, OT)  --  self-feeding skills, all  -BB     [place] Level/Cues  Needed (Self-Feeding Goal 1, OT)  --  set-up required;supervision required  -BB     Time Frame (Self-Feeding Goal 1, OT)  --  long term goal (LTG);by discharge  -BB     Progress/Outcomes (Self-Feeding Goal 1, OT)  --  goal not met  -BB        Strength Goal 1 (OT)    Strength Goal 1 (OT)  --  Pt will complete BUE AROM exercises in all planes to increase strength by 1/2 grade to increase functional independence with ADLs.   -BB     Time Frame (Strength Goal 1, OT)  --  long term goal (LTG);by discharge  -BB     Progress/Outcome (Strength Goal 1, OT)  --  goal not met  -BB        Coordination Goal 1 (OT)    Activity/Assistive Device (Coordination Goal 1, OT)  --  GM task;FM task  -BB     [place] Level/Cues Needed (Coordination Goal 1, OT)  --  minimum assist (75% or more patient effort);verbal cues required  -BB     Time Frame (Coordination Goal 1, OT)  --  long term goal (LTG);by discharge  -BB     Progress/Outcomes (Coordination Goal 1, OT)  --  goal not met  -BB       User Key  (r) = Recorded By, (t) = Taken By, (c) = Cosigned By    Initials Name Provider Type Discipline    TW Suleman Kohli, PTA Physical Therapy Assistant PT    BB Netta Robles, DIPTI/L Occupational Therapy Assistant OT              PT Recommendation and Plan  Anticipated Discharge Disposition (PT): skilled nursing facility  Outcome Summary/Treatment Plan (PT)  Daily Summary of Progress (PT): progress towards functional goals is fair  Plan for Continued Treatment (PT): Cont with mobility   Anticipated Discharge Disposition (PT): skilled nursing facility  Plan of Care Reviewed With: patient  Progress: no change  Outcome Summary: Pt cont to require extensive assist to get pt to standing and for pt to maintain standing for any length of time. Pt able to take 4 steps with max of 1 using wall railing to assist. Pt performed core strengthening while sitting in w/c by reaching outside [facility] in various directions for ~ 10 minutes.  Outcome  Measures     Row Name 05/02/19 1020 05/02/19 0825 05/01/19 1438       How much help from another person do you currently need...    Turning from your back to your side while in flat bed without using bedrails?  3  -TW  --  3  -TW    Moving from lying on back to sitting on the side of a flat bed without bedrails?  3  -TW  --  3  -TW    Moving to and from a bed to a chair (including a wheelchair)?  2  -TW  --  2  -TW    Standing up from a chair using your arms (e.g., wheelchair, bedside chair)?  2  -TW  --  2  -TW    Climbing 3-5 steps with a railing?  2  -TW  --  2  -TW    To walk in hospital room?  2  -TW  --  2  -TW    AM-PAC 6 Clicks Score  14  -TW  --  14  -TW       How much help from another is currently needed...    Putting on and taking off regular lower body clothing?  --  3  -BB  --    Bathing (including washing, rinsing, and drying)  --  3  -BB  --    Toileting (which includes using toilet bed pan or urinal)  --  3  -BB  --    Putting on and taking off regular upper body clothing  --  3  -BB  --    Taking care of personal grooming (such as brushing teeth)  --  3  -BB  --    Eating meals  --  3  -BB  --    Score  --  18  -BB  --       Functional Assessment    Outcome Measure Options  -PAC 6 Clicks Basic Mobility (PT)  -TW  --  -[facility] 6 Clicks Basic Mobility (PT)  -TW    Row Name 04/30/19 1600 04/30/19 1302          How much help from another person do you currently need...    Turning from your back to your side while in flat bed without using bedrails?  3  -TA  --     Moving from lying on back to sitting on the side of a flat bed without bedrails?  3  -TA  --     Moving to and from a bed to a chair (including a wheelchair)?  2  -TA  --     Standing up from a chair using your arms (e.g., wheelchair, bedside chair)?  2  -TA  --     Climbing 3-5 steps with a railing?  2  -TA  --     To walk in hospital room?  2  -TA  --     AM-PAC 6 Clicks Score  14  -TA  --        How much help from another is currently  needed...    Putting on and taking off regular lower body clothing?  --  3  -BB     Bathing (including washing, rinsing, and drying)  --  3  -BB     Toileting (which includes using toilet bed pan or urinal)  --  3  -BB     Putting on and taking off regular upper body clothing  --  3  -BB     Taking care of personal grooming (such as brushing teeth)  --  3  -BB     Eating meals  --  3  -BB     Score  --  18  -BB        Functional Assessment    Outcome Measure Options  AM-PAC 6 Clicks Basic Mobility (PT)  -TA  --       User Key  (r) = Recorded By, (t) = Taken By, (c) = Cosigned By    Initials Name Provider Type    TA Kaur Mitchell PTA Physical Therapy Assistant    TW Suleman Kohli, KAL Physical Therapy Assistant    BB Netta Robles, DIPTI/L Occupational Therapy Assistant         Time Calculation:   PT Charges     Row Name 05/02/19 1505             Time Calculation    Start Time  1020  -TW      Stop Time  1044  -TW      Time Calculation (min)  24 min  -TW      PT Received On  05/02/19  -TW      PT Goal Re-Cert Due Date  05/10/19  -TW         Time Calculation- PT    Total Timed Code Minutes- PT  24 minute(s)  -TW        User Key  (r) = Recorded By, (t) = Taken By, (c) = Cosigned By    Initials Name Provider Type    Suleman Lopez PTA Physical Therapy Assistant        Therapy Charges for Today     Code Description Service Date Service Provider Modifiers Qty    67436218929 HC PT THERAPEUTIC ACT EA 15 MIN 5/1/2019 Suleman Kohli, KAL GP 1    38008695828 HC PT THER PROC EA 15 MIN 5/1/2019 Suleman Kohli PTA GP 1    78718513312 HC PT THERAPEUTIC ACT EA 15 MIN 5/2/2019 Suleman Kohli, KAL GP 2          PT G-Codes  Outcome Measure Options: AM-PAC 6 Clicks Basic Mobility (PT)  AM-PAC 6 Clicks Score: 14  Score: 18    Suleman Kohli PTA  5/2/2019

## 2019-05-02 NOTE — NURSING NOTE
Behavior   Note any precipitants to event or behavior   Describe level and action of any aggressive behavior or speech and associated interventions.     Anxiety: Patient denies at this time  Depression: Patient denies at this time  Pain  0  AVH   no  S/I   no  Plan  no  H/I   no  Plan  no    Affect   flat      Note:Pt is alert and oriented to person this morning;. He does know he is in a hospital. Cooperative and compliant. No aggressive outbursts. Took medication with no problem. Will monitor for safety.       Intervention    PRN medication utilized:  no    Instructed in medication usage and effects  Medications administered as ordered  Encouraged to verbalize needs      Response    Verbalized understanding   Did patient take medications as ordered yes   Did patient interact with assessment?  yes     Plan    Will monitor for safety  Will monitor every 15 minutes as ordered  Will evaluate and promote the plan of care    Last BM:  {Date 04/29/2019  (Please chart in I/O as well)

## 2019-05-02 NOTE — PLAN OF CARE
Problem: Patient Care Overview  Goal: Plan of Care Review  Outcome: Ongoing (interventions implemented as appropriate)   05/02/19 1020   Coping/Psychosocial   Plan of Care Reviewed With patient   Coping/Psychosocial   Patient Agreement with Plan of Care agrees   Plan of Care Review   Progress no change   OTHER   Outcome Summary Pt cont to require extensive assist to get pt to standing and for pt to maintain standing for any length of time. Pt able to take 4 steps with max of 1 using wall railing to assist. Pt performed core strengthening while sitting in w/c by reaching outside [facility]E[employer] in various directions for ~ 10 minutes.

## 2019-05-02 NOTE — THERAPY TREATMENT NOTE
Acute Care - Occupational Therapy Treatment Note  [facility]     Patient Name: Sg Vega  : 1965  MRN: [id number]  Today's Date: 2019  Onset of Illness/Injury or Date of Surgery: 19  Date of Referral to OT: 19  Referring Physician: FERNANDO Gutierrez    Admit Date: 2019       ICD-10-CM ICD-9-CM   1. Oral phase dysphagia R13.11 787.21   2. Impaired functional mobility, balance, and endurance Z74.09 V49.89   3. 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 No pertinent surgical history.    Therapy Treatment    Rehabilitation Treatment Summary     Row Name 19 0825             Treatment Time/Intention    Discipline  occupational therapy assistant  -BB      Document Type  therapy note (daily note)  -BB      Subjective Information  no complaints  -BB      Mode of Treatment  individual therapy;occupational therapy  -BB      Patient/Family Observations  Kevin Canela tx  -BB      Total Minutes, Occupational Therapy Treatment  25  -BB      Therapy Frequency (OT Eval)  other (see comments) 5-7 days/wk  -BB      Patient Effort  good  -BB      Existing Precautions/Restrictions  fall;other (see comments) behavioral aggression, paranoia  -BB      Recorded by [BB] Netta Robles,  DIPTI/L 05/02/19 1254      Row Name 05/02/19 0825             Cognitive Assessment/Intervention- PT/OT    Affect/Mental Status (Cognitive)  flat/blunted affect  -BB      Orientation Status (Cognition)  oriented to;person  -BB      Follows Commands (Cognition)  follows one step commands;25-49% accuracy  -BB      Personal Safety Interventions  fall prevention program maintained;gait belt;muscle strengthening facilitated;nonskid shoes/slippers when out of bed  -BB      Recorded by [BB] Netta Robles COTA/L 05/02/19 1254      Row Name 05/02/19 0825             Therapeutic Exercise    Upper Extremity Range of Motion (Therapeutic Exercise)  shoulder flexion/extension, bilateral;elbow flexion/extension, bilateral;forearm supination/pronation, bilateral;wrist flexion/extension, bilateral;shoulder internal/external rotation, bilateral L shoulder ROM limited  -BB      Hand (Therapeutic Exercise)  finger flexion/extension, bilateral  -BB      Exercise Type (Therapeutic Exercise)  AROM (active range of motion)  -BB      Position (Therapeutic Exercise)  seated  -BB      Sets/Reps (Therapeutic Exercise)  1x10  -BB      Expected Outcome (Therapeutic Exercise)  improve functional tolerance, self-care activity;improve performance, transfer skills;improve functional stability  -BB      Recorded by [BB] Netta Robles COTA/L 05/02/19 1254      Row Name 05/02/19 0825             Positioning and Restraints    Pre-Treatment Position  sitting in chair/recliner  -BB      Post Treatment Position  wheelchair  -BB      In Wheelchair  sitting;encouraged to call for assist within staff view  -BB      Recorded by [BB] Netta Robles COTA/L 05/02/19 1254      Row Name 05/02/19 0825             Pain Scale: Numbers Pre/Post-Treatment    Pain Scale: Numbers, Pretreatment  0/10 - no pain  -BB      Pain Scale: Numbers, Post-Treatment  0/10 - no pain  -BB      Recorded by [BB] Netta Robles COTA/L 05/02/19 1254      Row Name  05/02/19 0825             Plan of Care Review    Plan of Care Reviewed With  patient  -BB      Recorded by [BB] Netta Robles COTA/L 05/02/19 1254      Row Name 05/02/19 0825             Outcome Summary/Treatment Plan (OT)    Daily Summary of Progress (OT)  progress towards functional goals is fair  -BB      Plan for Continued Treatment (OT)  continue POC  -BB      Anticipated Discharge Disposition (OT)  skilled nursing facility  -BB      Recorded by [BB] Netta Robles COTA/L 05/02/19 1254        User Key  (r) = Recorded By, (t) = Taken By, (c) = Cosigned By    Initials Name Effective Dates Discipline    BB Netta Robles COTA/L 03/07/18 -  OT           Rehab Goal Summary     Row Name 05/02/19 0825             Occupational Therapy Goals    Transfer Goal Selection (OT)  transfer, OT goal 1  -BB      Toileting Goal Selection (OT)  toileting, OT goal 1  -BB      Self-Feeding Goal Selection (OT)  self feeding, OT goal 1  -BB      Strength Goal Selection (OT)  strength, OT goal 1  -BB      Coordination Goal Selection (OT)  coordination, OT goal 1  -BB         Transfer Goal 1 (OT)    Activity/Assistive Device (Transfer Goal 1, OT)  sit-to-stand/stand-to-sit;bed-to-chair/chair-to-bed;toilet;walker, rolling  -BB      [place] Level/Cues Needed (Transfer Goal 1, OT)  contact guard assist  -BB      Time Frame (Transfer Goal 1, OT)  long term goal (LTG);by discharge  -BB      Progress/Outcome (Transfer Goal 1, OT)  goal not met  -BB         Toileting Goal 1 (OT)    Activity/Device (Toileting Goal 1, OT)  toileting skills, all  -BB      [place] Level/Cues Needed (Toileting Goal 1, OT)  contact guard assist  -BB      Time Frame (Toileting Goal 1, OT)  long term goal (LTG);by discharge  -BB      Progress/Outcome (Toileting Goal 1, OT)  goal not met  -BB         Self-Feeding Goal 1 (OT)    Activity/Assistive Device (Self-Feeding Goal 1, OT)  self-feeding skills, all  -BB      [place] Level/Cues  Needed (Self-Feeding Goal 1, OT)  set-up required;supervision required  -BB      Time Frame (Self-Feeding Goal 1, OT)  long term goal (LTG);by discharge  -BB      Progress/Outcomes (Self-Feeding Goal 1, OT)  goal not met  -BB         Strength Goal 1 (OT)    Strength Goal 1 (OT)  Pt will complete BUE AROM exercises in all planes to increase strength by 1/2 grade to increase functional independence with ADLs.   -BB      Time Frame (Strength Goal 1, OT)  long term goal (LTG);by discharge  -BB      Progress/Outcome (Strength Goal 1, OT)  goal not met  -BB         Coordination Goal 1 (OT)    Activity/Assistive Device (Coordination Goal 1, OT)  GM task;FM task  -BB      [place] Level/Cues Needed (Coordination Goal 1, OT)  minimum assist (75% or more patient effort);verbal cues required  -BB      Time Frame (Coordination Goal 1, OT)  long term goal (LTG);by discharge  -BB      Progress/Outcomes (Coordination Goal 1, OT)  goal not met  -BB        User Key  (r) = Recorded By, (t) = Taken By, (c) = Cosigned By    Initials Name Provider Type Discipline    Netta Chilel COTA/L Occupational Therapy Assistant OT        Occupational Therapy Education     Title: PT OT SLP Therapies (In Progress)     Topic: Occupational Therapy (In Progress)     Point: ADL training (Done)     Description: Instruct learner(s) on proper safety adaptation and remediation techniques during self care or transfers.   Instruct in proper use of assistive devices.    Learning Progress Summary           Patient Acceptance, E, VU by BB at 4/29/2019  2:52 PM    Acceptance, E,TB, VU by MR at 4/28/2019  4:30 PM    Comment:  Role of OT and POC. Benefit of activity                   Point: Precautions (Done)     Description: Instruct learner(s) on prescribed precautions during self-care and functional transfers.    Learning Progress Summary           Patient Acceptance, E,TB, VU by MR at 4/28/2019  4:30 PM    Comment:  Role of OT and POC. Benefit of  activity                   Point: Body mechanics (Done)     Description: Instruct learner(s) on proper positioning and spine alignment during self-care, functional mobility activities and/or exercises.    Learning Progress Summary           Patient Acceptance, E,TB, VU by MR at 4/28/2019  4:30 PM    Comment:  Role of OT and POC. Benefit of activity                               User Key     Initials Effective Dates Name Provider Type Discipline    BB 03/07/18 -  Netta Robles COTA/L Occupational Therapy Assistant OT    MR 04/03/18 -  Romina Marshall, OT Occupational Therapist OT                OT Recommendation and Plan  Outcome Summary/Treatment Plan (OT)  Daily Summary of Progress (OT): progress towards functional goals is fair  Plan for Continued Treatment (OT): continue POC  Anticipated Discharge Disposition (OT): skilled nursing facility  Therapy Frequency (OT Eval): other (see comments)(5-7 days/wk)  Daily Summary of Progress (OT): progress towards functional goals is fair  Plan of Care Review  Plan of Care Reviewed With: patient  Plan of Care Reviewed With: patient  Outcome Summary: Pt performed B UE exercises with mod verbal cues and demonstration. Pt having difficulty staying on task. No goals me this tx.  Outcome Measures     Row Name 05/02/19 0825 05/01/19 1438 04/30/19 1600       How much help from another person do you currently need...    Turning from your back to your side while in flat bed without using bedrails?  --  3  -TW  3  -TA    Moving from lying on back to sitting on the side of a flat bed without bedrails?  --  3  -TW  3  -TA    Moving to and from a bed to a chair (including a wheelchair)?  --  2  -TW  2  -TA    Standing up from a chair using your arms (e.g., wheelchair, bedside chair)?  --  2  -TW  2  -TA    Climbing 3-5 steps with a railing?  --  2  -TW  2  -TA    To walk in hospital room?  --  2  -TW  2  -TA    AM-PAC 6 Clicks Score  --  14  -TW  14  -TA       How much help from  another is currently needed...    Putting on and taking off regular lower body clothing?  3  -BB  --  --    Bathing (including washing, rinsing, and drying)  3  -BB  --  --    Toileting (which includes using toilet bed pan or urinal)  3  -BB  --  --    Putting on and taking off regular upper body clothing  3  -BB  --  --    Taking care of personal grooming (such as brushing teeth)  3  -BB  --  --    Eating meals  3  -BB  --  --    Score  18  -BB  --  --       Functional Assessment    Outcome Measure Options  --  AM-PAC 6 Clicks Basic Mobility (PT)  -  AM-PAC 6 Clicks Basic Mobility (PT)  -    Row Name 04/30/19 1302             How much help from another is currently needed...    Putting on and taking off regular lower body clothing?  3  -BB      Bathing (including washing, rinsing, and drying)  3  -BB      Toileting (which includes using toilet bed pan or urinal)  3  -BB      Putting on and taking off regular upper body clothing  3  -BB      Taking care of personal grooming (such as brushing teeth)  3  -BB      Eating meals  3  -BB      Score  18  -BB        User Key  (r) = Recorded By, (t) = Taken By, (c) = Cosigned By    Initials Name Provider Type    TA Kaur Mitchell, KAL Physical Therapy Assistant    TW Suleman Kohli, KAL Physical Therapy Assistant    Netta Chilel COTA/L Occupational Therapy Assistant           Time Calculation:   Time Calculation- OT     Row Name 05/02/19 1256             Time Calculation- OT    OT Start Time  0825  -BB      OT Stop Time  0850  -BB      OT Time Calculation (min)  25 min  -BB      Total Timed Code Minutes- OT  25 minute(s)  -BB      OT Received On  05/02/19  -BB        User Key  (r) = Recorded By, (t) = Taken By, (c) = Cosigned By    Initials Name Provider Type    Netta Chilel COTA/L Occupational Therapy Assistant        Therapy Charges for Today     Code Description Service Date Service Provider Modifiers Qty    [id number]  OT THER PROC EA  15 MIN 5/2/2019 Netta Robles COTA/L GO 2               DANIEL Morales  5/2/2019

## 2019-05-02 NOTE — PLAN OF CARE
Problem: Patient Care Overview  Goal: Plan of Care Review  Outcome: Ongoing (interventions implemented as appropriate)   05/02/19 9835   Coping/Psychosocial   Plan of Care Reviewed With patient   Coping/Psychosocial   Patient Agreement with Plan of Care agrees   Plan of Care Review   Progress no change   OTHER   Outcome Summary Pt performed B UE exercises with mod verbal cues and demonstration. Pt having difficulty staying on task. No goals me this tx.

## 2019-05-02 NOTE — PLAN OF CARE
Problem: Violence, Risk/Actual (Adult)  Goal: Anger Control  Outcome: Ongoing (interventions implemented as appropriate)

## 2019-05-02 NOTE — PLAN OF CARE
Problem: Patient Care Overview  Goal: Plan of Care Review  Outcome: Ongoing (interventions implemented as appropriate)    Goal: Individualization and Mutuality  Outcome: Ongoing (interventions implemented as appropriate)    Goal: Discharge Needs Assessment  Outcome: Ongoing (interventions implemented as appropriate)    Goal: Interprofessional Rounds/Family Conf  Outcome: Ongoing (interventions implemented as appropriate)      Problem: Overarching Goals (Adult)  Goal: Adheres to Safety Considerations for Self and Others  Outcome: Ongoing (interventions implemented as appropriate)    Goal: Optimized Coping Skills in Response to Life Stressors  Outcome: Ongoing (interventions implemented as appropriate)    Goal: Develops/Participates in Therapeutic [place] to Support Successful Transition  Outcome: Ongoing (interventions implemented as appropriate)      Problem: Skin Injury Risk (Adult)  Goal: Skin Health and Integrity  Outcome: Ongoing (interventions implemented as appropriate)      Problem: Fall Risk (Adult)  Goal: Absence of Fall  Outcome: Ongoing (interventions implemented as appropriate)      Problem: Violence, Risk/Actual (Adult)  Goal: Impulse Control  Outcome: Ongoing (interventions implemented as appropriate)    Goal: Anger Control  Outcome: Ongoing (interventions implemented as appropriate)      Problem: Cognitive Impairment (Dementia Signs/Symptoms) (Adult)  Goal: Optimized Cognitive Function (Dementia Signs/Symptoms)  Outcome: Ongoing (interventions implemented as appropriate)      Problem: Behavioral Impairment (Dementia Signs/Symptoms) (Adult)  Goal: Improved Behavioral Control (Dementia Signs/Symptoms)  Outcome: Ongoing (interventions implemented as appropriate)
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
## 2019-05-03 PROCEDURE — 99232 SBSQ HOSP IP/OBS MODERATE 35: CPT | Performed by: NURSE PRACTITIONER

## 2019-05-03 PROCEDURE — 97110 THERAPEUTIC EXERCISES: CPT

## 2019-05-03 PROCEDURE — 97530 THERAPEUTIC ACTIVITIES: CPT

## 2019-05-03 PROCEDURE — 25010000002 ZIPRASIDONE MESYLATE PER 10 MG: Performed by: NURSE PRACTITIONER

## 2019-05-03 RX ORDER — PANTOPRAZOLE SODIUM 40 MG/1
40 TABLET, DELAYED RELEASE ORAL EVERY MORNING
Status: DISCONTINUED | OUTPATIENT
Start: 2019-05-03 | End: 2019-05-07 | Stop reason: HOSPADM

## 2019-05-03 RX ADMIN — PANTOPRAZOLE SODIUM 40 MG: 40 TABLET, DELAYED RELEASE ORAL at 08:53

## 2019-05-03 RX ADMIN — RISPERIDONE 1 MG: 1 TABLET ORAL at 08:21

## 2019-05-03 RX ADMIN — ZIPRASIDONE MESYLATE 10 MG: 20 INJECTION, POWDER, LYOPHILIZED, FOR SOLUTION INTRAMUSCULAR at 21:09

## 2019-05-03 RX ADMIN — ERGOCALCIFEROL SOLN 200 MCG/ML (8000 UNIT/ML) 2000 UNITS: 8000 SOLUTION at 08:24

## 2019-05-03 RX ADMIN — ATORVASTATIN CALCIUM 10 MG: 10 TABLET, FILM COATED ORAL at 08:21

## 2019-05-03 RX ADMIN — LISINOPRIL 10 MG: 10 TABLET ORAL at 08:21

## 2019-05-03 RX ADMIN — CYCLOBENZAPRINE HYDROCHLORIDE 10 MG: 10 TABLET, FILM COATED ORAL at 08:21

## 2019-05-03 RX ADMIN — ASPIRIN 81 MG CHEWABLE TABLET 81 MG: 81 TABLET CHEWABLE at 08:21

## 2019-05-03 RX ADMIN — CYCLOBENZAPRINE HYDROCHLORIDE 10 MG: 10 TABLET, FILM COATED ORAL at 17:01

## 2019-05-03 RX ADMIN — SERTRALINE HYDROCHLORIDE 100 MG: 50 TABLET ORAL at 08:21

## 2019-05-03 RX ADMIN — AMLODIPINE BESYLATE 10 MG: 10 TABLET ORAL at 08:21

## 2019-05-03 RX ADMIN — FOLIC ACID 1 MG: 1 TABLET ORAL at 08:22

## 2019-05-03 NOTE — THERAPY TREATMENT NOTE
Acute Care - Physical Therapy Treatment Note  [facility]     Patient Name: Sg Vega  : 1965  MRN: [id number]  Today's Date: 5/3/2019  Onset of Illness/Injury or Date of Surgery: 19  Date of Referral to PT: 19  Referring Physician: FERNANDO Gutierrez    Admit Date: 2019    Visit Dx:    ICD-10-CM ICD-9-CM   1. Oral phase dysphagia R13.11 787.21   2. Impaired functional mobility, balance, and endurance Z74.09 V49.89   3. 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  -TW  --     Therapy Frequency (PT Clinical Impression)  --  daily  -LM     Patient Effort  good  -TW  --     Reason Treatment Not Performed  --  patient/family declined treatment  -LM     Existing Precautions/Restrictions  fall;other (see comments) behavorial, aggressive, paranoia.  -TW  --     Recorded by [TW] Suleman Kohli, KAL 19 1704 [LM] Salma Pizarro COTA/L 19 1530     Row Name 19 1540             Vital Signs    Pre Systolic BP Rehab  101  -TW      Pre Treatment Diastolic BP  58  -TW      Post Systolic BP Rehab  91  -TW      Post Treatment Diastolic BP  52 Nsg aware.  -TW      Pretreatment Heart Rate  (beats/min)  84  -TW      Posttreatment Heart Rate (beats/min)  80  -TW      Pre SpO2 (%)  99  -TW      O2 Delivery Pre Treatment  room air  -TW      Post SpO2 (%)  99  -TW      Pre Patient Position  Sitting in bathroom on toilet.  -TW      Intra Patient Position  Standing  -TW      Post Patient Position  Supine  -TW      Recorded by [TW] Suleman Kohli PTA 05/03/19 1704      Row Name 05/03/19 1540 05/03/19 1440          Cognitive Assessment/Intervention- PT/OT    Affect/Mental Status (Cognitive)  flat/blunted affect  -TW  flat/blunted affect  -LM     Orientation Status (Cognition)  oriented to;person  -TW  oriented to;person  -LM     Follows Commands (Cognition)  follows one step commands  -TW  follows one step commands  -LM     Cognitive Function (Cognitive)  safety deficit  -TW  safety deficit  -LM     Safety Deficit (Cognitive)  moderate deficit  -TW  moderate deficit  -LM     Personal Safety Interventions  fall prevention program maintained;gait belt;nonskid shoes/slippers when out of bed  -TW  fall prevention program maintained  -LM     Recorded by [TW] Suleman Kohli PTA 05/03/19 1704 [LM] Salma Pizarro COTA/L 05/03/19 1530     Row Name 05/03/19 1540 05/03/19 1440          Bed Mobility Assessment/Treatment    Bed Mobility Assessment/Treatment  --  --  -LM     Scooting/Bridging [place] (Bed Mobility)  verbal cues  -TW  --     Supine-Sit [place] (Bed Mobility)  not tested  -TW  --     Sit-Supine [place] (Bed Mobility)  supervision  -TW  --     Bed Mobility, Safety Issues  decreased use of legs for bridging/pushing  -TW  --     Comment (Bed Mobility)  bed flat and hand rail down.  -TW  --     Recorded by [TW] Suleman Kohli PTA 05/03/19 1704 [LM] Salma Pizarro COTA/L 05/03/19 1530     Row Name 05/03/19 1540 05/03/19 1440          Sit-Stand Transfer    Sit-Stand [place] (Transfers)  minimum assist (75% patient effort)  -TW  minimum assist (75% patient effort) sit  to stand x 3   -LM     Assistive Device (Sit-Stand Transfers)  -- HHA  -TW  --     Recorded by [TW] Suleman Kohli, PTA 05/03/19 1704 [LM] Salma Pizarro RESENDEZ/L 05/03/19 1530     Row Name 05/03/19 1540 05/03/19 1440          Stand-Sit Transfer    Stand-Sit [place] (Transfers)  minimum assist (75% patient effort)  -TW  minimum assist (75% patient effort)  -LM     Assistive Device (Stand-Sit Transfers)  -- HHA  -TW  --     Recorded by [TW] Suleman Kohli, PTA 05/03/19 1704 [LM] Salma Pizarro RESENDEZ/L 05/03/19 1530     Row Name 05/03/19 1540             Gait/Stairs Assessment/Training    Gait/Stairs Assessment/Training  gait/ambulation independence  -TW      [place] Level (Gait)  moderate assist (50% patient effort)  -TW      Assistive Device (Gait)  other (see comments) HHA  -TW      Distance in Feet (Gait)  5ft to bed from bathroom.  -TW      Deviations/Abnormal Patterns (Gait)  base of support, narrow  -TW      Recorded by [TW] Suleman Kohli, KAL 05/03/19 1704      Row Name 05/03/19 1540 05/03/19 1440          Therapeutic Exercise    Upper Extremity Range of Motion (Therapeutic Exercise)  --  shoulder flexion/extension, bilateral;elbow flexion/extension, bilateral;wrist flexion/extension, bilateral;shoulder horizontal abduction/adduction, bilateral;shoulder abduction/adduction, bilateral shoulder shrugs and reaching [place] for increase rom   -LM     Lower Extremity (Therapeutic Exercise)  gluteal sets;quad sets, bilateral;heel slides, bilateral;SAQ (short arc quad), bilateral;SLR (straight leg raise), bilateral  -TW  --     Lower Extremity Range of Motion (Therapeutic Exercise)  hip abduction/adduction, bilateral;ankle dorsiflexion/plantar flexion, bilateral  -TW  --     Exercise Type (Therapeutic Exercise)  AROM (active range of motion);AAROM (active assistive range of motion)  -TW  --     Position (Therapeutic Exercise)  supine  -TW  --     Sets/Reps (Therapeutic Exercise)  1/10   -TW  --     Recorded by [TW] Suleman Kohli, KAL 05/03/19 1704 [LM] Salma Pizarro RESENDEZ/L 05/03/19 1530     Row Name 05/03/19 1540 05/03/19 1440          Positioning and Restraints    Pre-Treatment Position  bathroom  -TW  sitting in chair/recliner  -LM     Post Treatment Position  bed  -TW  wheelchair  -LM     In Bed  supine;call light within reach;encouraged to call for assist;exit alarm on  -TW  --     In Chair  notified nsg  -TW  --     In Wheelchair  --  -- sitting near nursing station   -LM     Recorded by [TW] Suleman Kohli, PTA 05/03/19 1704 [LM] Salma Pizarro RESENDEZ/L 05/03/19 1530     Row Name 05/03/19 1540 05/03/19 1440          Pain Scale: Numbers Pre/Post-Treatment    Pain Scale: Numbers, Pretreatment  0/10 - no pain  -TW  0/10 - no pain  -LM     Pain Scale: Numbers, Post-Treatment  0/10 - no pain  -TW  0/10 - no pain  -LM     Recorded by [TW] Suleman Kohli, PTA 05/03/19 1704 [LM] Salma Pizarro RESENDEZ/L 05/03/19 1530     Row Name 05/03/19 1440             Outcome Summary/Treatment Plan (OT)    Daily Summary of Progress (OT)  progress toward functional goals as expected  -LM      Anticipated Discharge Disposition (OT)  skilled nursing facility  -LM      Recorded by [LM] Salma Pizarro COTA/L 05/03/19 1530      Row Name 05/03/19 1540             Outcome Summary/Treatment Plan (PT)    Daily Summary of Progress (PT)  progress towards functional goals is fair  -TW      Plan for Continued Treatment (PT)  Cont to mobilize  -TW      Anticipated Discharge Disposition (PT)  skilled nursing facility  -TW      Recorded by [TW] Suleman Kohli, PTA 05/03/19 1704        User Key  (r) = Recorded By, (t) = Taken By, (c) = Cosigned By    Initials Name Effective Dates Discipline    TW Suleman Kohli, PTA 03/07/18 -  PT    LM Salma Pizarro RESENDEZ/L 03/07/18 -  OT               Rehab Goal Summary     Row Name 05/03/19 1540 05/03/19 1531          Physical Therapy Goals    Bed  Mobility Goal Selection (PT)  bed mobility, PT goal 1  -TW  --     Transfer Goal Selection (PT)  transfer, PT goal 1  -TW  --     Gait Training Goal Selection (PT)  gait training, PT goal 1  -TW  --        Bed Mobility Goal 1 (PT)    Activity/Assistive Device (Bed Mobility Goal 1, PT)  sit to supine;supine to sit;scooting  -TW  --     [place] Level/Cues Needed (Bed Mobility Goal 1, PT)  standby assist  -TW  --     Time Frame (Bed Mobility Goal 1, PT)  by discharge  -TW  --     Progress/Outcomes (Bed Mobility Goal 1, PT)  goal not met  -TW  --        Transfer Goal 1 (PT)    Activity/Assistive Device (Transfer Goal 1, PT)  bed-to-chair/chair-to-bed  -TW  --     [place] Level/Cues Needed (Transfer Goal 1, PT)  contact guard assist  -TW  --     Time Frame (Transfer Goal 1, PT)  by discharge  -TW  --     Progress/Outcome (Transfer Goal 1, PT)  goal not met  -TW  --        Gait Training Goal 1 (PT)    Activity/Assistive Device (Gait Training Goal 1, PT)  gait (walking locomotion);walker, rolling  -TW  --     [place] Level (Gait Training Goal 1, PT)  contact guard assist  -TW  --     Distance (Gait Goal 1, PT)  15' x 1  -TW  --     Time Frame (Gait Training Goal 1, PT)  by discharge  -TW  --     Barriers (Gait Training Goal 1, PT)  MS, agitation  -TW  --     Progress/Outcome (Gait Training Goal 1, PT)  goal not met  -TW  --        Occupational Therapy Goals    Transfer Goal Selection (OT)  --  transfer, OT goal 1  -LM     Toileting Goal Selection (OT)  --  toileting, OT goal 1  -LM     Self-Feeding Goal Selection (OT)  --  self feeding, OT goal 1  -LM     Strength Goal Selection (OT)  --  strength, OT goal 1  -LM     Coordination Goal Selection (OT)  --  coordination, OT goal 1  -LM        Transfer Goal 1 (OT)    Activity/Assistive Device (Transfer Goal 1, OT)  --  sit-to-stand/stand-to-sit;bed-to-chair/chair-to-bed;toilet;walker, rolling  -LM     [place] Level/Cues Needed (Transfer Goal 1, OT)  --   contact guard assist  -LM     Time Frame (Transfer Goal 1, OT)  --  long term goal (LTG);by discharge  -LM     Progress/Outcome (Transfer Goal 1, OT)  --  goal not met  -LM        Toileting Goal 1 (OT)    Activity/Device (Toileting Goal 1, OT)  --  toileting skills, all  -LM     [place] Level/Cues Needed (Toileting Goal 1, OT)  --  contact guard assist  -LM     Time Frame (Toileting Goal 1, OT)  --  long term goal (LTG);by discharge  -LM     Progress/Outcome (Toileting Goal 1, OT)  --  goal not met  -LM        Self-Feeding Goal 1 (OT)    Activity/Assistive Device (Self-Feeding Goal 1, OT)  --  self-feeding skills, all  -LM     [place] Level/Cues Needed (Self-Feeding Goal 1, OT)  --  set-up required;supervision required  -LM     Time Frame (Self-Feeding Goal 1, OT)  --  long term goal (LTG);by discharge  -LM     Progress/Outcomes (Self-Feeding Goal 1, OT)  --  goal not met  -LM        Strength Goal 1 (OT)    Strength Goal 1 (OT)  --  Pt will complete BUE AROM exercises in all planes to increase strength by 1/2 grade to increase functional independence with ADLs.   -LM     Time Frame (Strength Goal 1, OT)  --  long term goal (LTG);by discharge  -LM     Progress/Outcome (Strength Goal 1, OT)  --  goal not met  -LM        Coordination Goal 1 (OT)    Activity/Assistive Device (Coordination Goal 1, OT)  --  GM task;FM task  -LM     [place] Level/Cues Needed (Coordination Goal 1, OT)  --  minimum assist (75% or more patient effort);verbal cues required  -LM     Time Frame (Coordination Goal 1, OT)  --  long term goal (LTG);by discharge  -LM     Progress/Outcomes (Coordination Goal 1, OT)  --  goal not met  -LM       User Key  (r) = Recorded By, (t) = Taken By, (c) = Cosigned By    Initials Name Provider Type Discipline    TW Suleman Kohli, PTA Physical Therapy Assistant PT    LM Salma Pizarro, RESENDEZ/L Occupational Therapy Assistant OT              PT Recommendation and Plan  Anticipated Discharge  Disposition (PT): skilled nursing facility  Outcome Summary/Treatment Plan (PT)  Daily Summary of Progress (PT): progress towards functional goals is fair  Plan for Continued Treatment (PT): Cont to mobilize  Anticipated Discharge Disposition (PT): skilled nursing facility  Plan of Care Reviewed With: patient  Progress: improving  Outcome Summary: Pt showing improvement with t/f's and gait and displays improved cooperation this pm with therapy. Pt will cont to need SNF upon DC.  Outcome Measures     Row Name 05/03/19 1540 05/02/19 1020 05/02/19 0825       How much help from another person do you currently need...    Turning from your back to your side while in flat bed without using bedrails?  3  -TW  3  -TW  --    Moving from lying on back to sitting on the side of a flat bed without bedrails?  3  -TW  3  -TW  --    Moving to and from a bed to a chair (including a wheelchair)?  2  -TW  2  -TW  --    Standing up from a chair using your arms (e.g., wheelchair, bedside chair)?  2  -TW  2  -TW  --    Climbing 3-5 steps with a railing?  2  -TW  2  -TW  --    To walk in hospital room?  2  -TW  2  -TW  --    AM-PAC 6 Clicks Score  14  -TW  14  -TW  --       How much help from another is currently needed...    Putting on and taking off regular lower body clothing?  --  --  3  -BB    Bathing (including washing, rinsing, and drying)  --  --  3  -BB    Toileting (which includes using toilet bed pan or urinal)  --  --  3  -BB    Putting on and taking off regular upper body clothing  --  --  3  -BB    Taking care of personal grooming (such as brushing teeth)  --  --  3  -BB    Eating meals  --  --  3  -BB    Score  --  --  18  -BB       Functional Assessment    Outcome Measure Options  AM-PAC 6 Clicks Basic Mobility (PT)  -TW  AM-PAC 6 Clicks Basic Mobility (PT)  -TW  --    Row Name 05/01/19 1439             How much help from another person do you currently need...    Turning from your back to your side while in flat bed  without using bedrails?  3  -TW      Moving from lying on back to sitting on the side of a flat bed without bedrails?  3  -TW      Moving to and from a bed to a chair (including a wheelchair)?  2  -TW      Standing up from a chair using your arms (e.g., wheelchair, bedside chair)?  2  -TW      Climbing 3-5 steps with a railing?  2  -TW      To walk in hospital room?  2  -TW      AM-PAC 6 Clicks Score  14  -TW         Functional Assessment    Outcome Measure Options  AM-PAC 6 Clicks Basic Mobility (PT)  -TW        User Key  (r) = Recorded By, (t) = Taken By, (c) = Cosigned By    Initials Name Provider Type    TW Suleman Kohli PTA Physical Therapy Assistant    Netta Chilel COTA/L Occupational Therapy Assistant         Time Calculation:   PT Charges     Row Name 05/03/19 1706             Time Calculation    Start Time  1540  -TW      Stop Time  1609  -TW      Time Calculation (min)  29 min  -TW      PT Received On  05/03/19  -TW      PT Goal Re-Cert Due Date  05/10/19  -TW         Time Calculation- PT    Total Timed Code Minutes- PT  29 minute(s)  -TW        User Key  (r) = Recorded By, (t) = Taken By, (c) = Cosigned By    Initials Name Provider Type    TW Suleman Kohli PTA Physical Therapy Assistant        Therapy Charges for Today     Code Description Service Date Service Provider Modifiers Qty    25422413522 HC PT THERAPEUTIC ACT EA 15 MIN 5/2/2019 Suleman Kohli PTA GP 2    21451346378 HC PT THERAPEUTIC ACT EA 15 MIN 5/3/2019 Suleman Kohli PTA GP 1    10771724083 HC PT THER PROC EA 15 MIN 5/3/2019 Suleman Kohli PTA GP 1          PT G-Codes  Outcome Measure Options: AM-PAC 6 Clicks Basic Mobility (PT)  AM-PAC 6 Clicks Score: 14  Score: 18    Suleman Kohli PTA  5/3/2019

## 2019-05-03 NOTE — SIGNIFICANT NOTE
05/03/19 1455   Rehab Treatment   Discipline physical therapy assistant   Reason Treatment Not Performed unavailable for treatment  (Pt with RESENDEZ being tx.)

## 2019-05-03 NOTE — NURSING NOTE
"Behavior   Pt sleeping fair this night.  Was up x 1 for about 20 mins.  He was disoriented, kept talking about feeding \"the kids breakfast.\"    Intervention  Safety Rounds completed.    Response  Pt sleeping fair thru out night.  No distress noted.    Plan  Continue current plan  "

## 2019-05-03 NOTE — PLAN OF CARE
Problem: Patient Care Overview  Goal: Plan of Care Review  Outcome: Ongoing (interventions implemented as appropriate)   05/03/19 1600   Coping/Psychosocial   Plan of Care Reviewed With patient   Coping/Psychosocial   Patient Agreement with Plan of Care agrees   Plan of Care Review   Progress improving   OTHER   Outcome Summary Pt showing improvement with t/f's and gait and displays improved cooperation this pm with therapy. Pt will cont to need SNF upon DC.

## 2019-05-03 NOTE — NURSING NOTE
"Behavior   Note any precipitants to event or behavior   Describe level and action of any aggressive behavior or speech and associated interventions.     Anxiety: Decreased concentration  Depression: impaired memory  Pain  0  AVH   no  S/I   no  Plan  no  H/I   no  Plan  no    Affect   blunted      Note: Patient up in [facility] dayroom area, patient is pleasant, dressed appropriate for weather, interacting appropriately with blunted affect and good eye contact. Patient seeks out staff frequently for conversation and interacts often with peers. He took medication well and is able to verbalize needs/concerns. He reports that he slept well last night and feels rested this am. He is exhibiting improved impulse and anger control, he is asking for things instead of demanding them. He is helpful and supportive to his peers. He is alert to self and place but not situation and time. He was talking to this staff about \"some kids, playing out by the basketball courts\" but would not say if this happened last night, this morning or a while he was at the nursing home. He agrees to notify staff of any concerns, staff to monitor closely.      Intervention    PRN medication utilized:  no    Instructed in medication usage and effects  Medications administered as ordered  Encouraged to verbalize needs      Response    Verbalized understanding   Did patient take medications as ordered yes   Did patient interact with assessment?  yes     Plan    Will monitor for safety  Will monitor every 15 minutes as ordered  Will evaluate and promote the plan of care    Last BM:    (Please chart in I/O as well)    "

## 2019-05-03 NOTE — THERAPY TREATMENT NOTE
Inpatient Rehabilitation - Occupational Therapy Treatment Note  [facility]     Patient Name: Sg Vega  : 1965  MRN: [id number]  Today's Date: 5/3/2019  Onset of Illness/Injury or Date of Surgery: 19  Date of Referral to OT: 19  Referring Physician: FERNANDO Gutierrez    Admit Date: 2019       ICD-10-CM ICD-9-CM   1. Oral phase dysphagia R13.11 787.21   2. Impaired functional mobility, balance, and endurance Z74.09 V49.89   3. 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 No pertinent surgical history.    Therapy Treatment    Rehabilitation Treatment Summary     Row Name 19 1440             Treatment Time/Intention    Discipline  occupational therapy assistant  -LM      Document Type  therapy note (daily note)  -LM      Subjective Information  no complaints  -LM      Mode of Treatment  occupational therapy;individual therapy  -LM      Therapy Frequency (PT Clinical Impression)  daily  -LM      Reason Treatment Not Performed  patient/family declined treatment  -LM      Recorded by [LM] Salma Pizarro COTA/L 19 1530      Row Name 19 1440             Cognitive Assessment/Intervention- PT/OT    Affect/Mental Status (Cognitive)  flat/blunted affect  -LM       Orientation Status (Cognition)  oriented to;person  -LM      Follows Commands (Cognition)  follows one step commands  -LM      Cognitive Function (Cognitive)  safety deficit  -LM      Safety Deficit (Cognitive)  moderate deficit  -LM      Personal Safety Interventions  fall prevention program maintained  -LM      Recorded by [LM] Salma Pizarro COTA/L 05/03/19 1530      Row Name 05/03/19 1440             Bed Mobility Assessment/Treatment    Bed Mobility Assessment/Treatment  --  -LM      Recorded by [LM] Salma Pizarro COTA/L 05/03/19 1530      Row Name 05/03/19 1440             Sit-Stand Transfer    Sit-Stand [place] (Transfers)  minimum assist (75% patient effort) sit to stand x 3   -LM      Recorded by [LM] Salma Pizarro COTA/L 05/03/19 1530      Row Name 05/03/19 1440             Stand-Sit Transfer    Stand-Sit [place] (Transfers)  minimum assist (75% patient effort)  -LM      Recorded by [LM] Salma Pizarro COTA/L 05/03/19 1530      Row Name 05/03/19 1440             Therapeutic Exercise    Upper Extremity Range of Motion (Therapeutic Exercise)  shoulder flexion/extension, bilateral;elbow flexion/extension, bilateral;wrist flexion/extension, bilateral;shoulder horizontal abduction/adduction, bilateral;shoulder abduction/adduction, bilateral shoulder shrugs and reaching [place] for increase rom   -LM      Recorded by [LM] Salma Pizarro COTA/L 05/03/19 1530      Row Name 05/03/19 1440             Positioning and Restraints    Pre-Treatment Position  sitting in chair/recliner  -LM      Post Treatment Position  wheelchair  -LM      In Wheelchair  -- sitting near nursing station   -LM      Recorded by [LM] Salma Pizarro COTA/L 05/03/19 1530      Row Name 05/03/19 1440             Pain Scale: Numbers Pre/Post-Treatment    Pain Scale: Numbers, Pretreatment  0/10 - no pain  -LM      Pain Scale: Numbers, Post-Treatment  0/10 - no pain  -LM      Recorded by [LM] Salma Pizarro,  RESENDEZ/L 05/03/19 1530      Row Name 05/03/19 1440             Outcome Summary/Treatment Plan (OT)    Daily Summary of Progress (OT)  progress toward functional goals as expected  -LM      Anticipated Discharge Disposition (OT)  skilled nursing facility  -LM      Recorded by [LM] Salma Pizarro COTA/L 05/03/19 1530        User Key  (r) = Recorded By, (t) = Taken By, (c) = Cosigned By    Initials Name Effective Dates Discipline    LM Salma Pizarro COTA/L 03/07/18 -  OT           Rehab Goal Summary     Row Name 05/03/19 1531             Occupational Therapy Goals    Transfer Goal Selection (OT)  transfer, OT goal 1  -LM      Toileting Goal Selection (OT)  toileting, OT goal 1  -LM      Self-Feeding Goal Selection (OT)  self feeding, OT goal 1  -LM      Strength Goal Selection (OT)  strength, OT goal 1  -LM      Coordination Goal Selection (OT)  coordination, OT goal 1  -LM         Transfer Goal 1 (OT)    Activity/Assistive Device (Transfer Goal 1, OT)  sit-to-stand/stand-to-sit;bed-to-chair/chair-to-bed;toilet;walker, rolling  -LM      [place] Level/Cues Needed (Transfer Goal 1, OT)  contact guard assist  -LM      Time Frame (Transfer Goal 1, OT)  long term goal (LTG);by discharge  -LM      Progress/Outcome (Transfer Goal 1, OT)  goal not met  -LM         Toileting Goal 1 (OT)    Activity/Device (Toileting Goal 1, OT)  toileting skills, all  -LM      [place] Level/Cues Needed (Toileting Goal 1, OT)  contact guard assist  -LM      Time Frame (Toileting Goal 1, OT)  long term goal (LTG);by discharge  -LM      Progress/Outcome (Toileting Goal 1, OT)  goal not met  -LM         Self-Feeding Goal 1 (OT)    Activity/Assistive Device (Self-Feeding Goal 1, OT)  self-feeding skills, all  -LM      [place] Level/Cues Needed (Self-Feeding Goal 1, OT)  set-up required;supervision required  -LM      Time Frame (Self-Feeding Goal 1, OT)  long term goal (LTG);by discharge  -LM      Progress/Outcomes  (Self-Feeding Goal 1, OT)  goal not met  -LM         Strength Goal 1 (OT)    Strength Goal 1 (OT)  Pt will complete BUE AROM exercises in all planes to increase strength by 1/2 grade to increase functional independence with ADLs.   -LM      Time Frame (Strength Goal 1, OT)  long term goal (LTG);by discharge  -LM      Progress/Outcome (Strength Goal 1, OT)  goal not met  -LM         Coordination Goal 1 (OT)    Activity/Assistive Device (Coordination Goal 1, OT)  GM task;FM task  -LM      [place] Level/Cues Needed (Coordination Goal 1, OT)  minimum assist (75% or more patient effort);verbal cues required  -LM      Time Frame (Coordination Goal 1, OT)  long term goal (LTG);by discharge  -LM      Progress/Outcomes (Coordination Goal 1, OT)  goal not met  -LM        User Key  (r) = Recorded By, (t) = Taken By, (c) = Cosigned By    Initials Name Provider Type Discipline    Salma Tang COTA/L Occupational Therapy Assistant OT        Occupational Therapy Education     Title: PT OT SLP Therapies (In Progress)     Topic: Occupational Therapy (In Progress)     Point: ADL training (Done)     Description: Instruct learner(s) on proper safety adaptation and remediation techniques during self care or transfers.   Instruct in proper use of assistive devices.    Learning Progress Summary           Patient Acceptance, E, VU by BB at 4/29/2019  2:52 PM    Acceptance, E,TB, VU by MR at 4/28/2019  4:30 PM    Comment:  Role of OT and POC. Benefit of activity                   Point: Precautions (Done)     Description: Instruct learner(s) on prescribed precautions during self-care and functional transfers.    Learning Progress Summary           Patient Acceptance, E,TB, VU by MR at 4/28/2019  4:30 PM    Comment:  Role of OT and POC. Benefit of activity                   Point: Body mechanics (Done)     Description: Instruct learner(s) on proper positioning and spine alignment during self-care, functional mobility  activities and/or exercises.    Learning Progress Summary           Patient Acceptance, E,TB, VU by MR at 4/28/2019  4:30 PM    Comment:  Role of OT and POC. Benefit of activity                               User Key     Initials Effective Dates Name Provider Type Discipline    BB 03/07/18 -  Netta Robles COTA/L Occupational Therapy Assistant OT    MR 04/03/18 -  Romina Marshall, OT Occupational Therapist OT                OT Recommendation and Plan  Outcome Summary/Treatment Plan (OT)  Daily Summary of Progress (OT): progress toward functional goals as expected  Anticipated Discharge Disposition (OT): skilled nursing facility  Daily Summary of Progress (OT): progress toward functional goals as expected  Plan of Care Review  Plan of Care Reviewed With: patient  Plan of Care Reviewed With: patient  Outcome Summary: improving toawdr all goals cont toward strength adl perf   Outcome Measures     Row Name 05/02/19 1020 05/02/19 0825 05/01/19 1438       How much help from another person do you currently need...    Turning from your back to your side while in flat bed without using bedrails?  3  -TW  --  3  -TW    Moving from lying on back to sitting on the side of a flat bed without bedrails?  3  -TW  --  3  -TW    Moving to and from a bed to a chair (including a wheelchair)?  2  -TW  --  2  -TW    Standing up from a chair using your arms (e.g., wheelchair, bedside chair)?  2  -TW  --  2  -TW    Climbing 3-5 steps with a railing?  2  -TW  --  2  -TW    To walk in hospital room?  2  -TW  --  2  -TW    AM-PAC 6 Clicks Score  14  -TW  --  14  -TW       How much help from another is currently needed...    Putting on and taking off regular lower body clothing?  --  3  -BB  --    Bathing (including washing, rinsing, and drying)  --  3  -BB  --    Toileting (which includes using toilet bed pan or urinal)  --  3  -BB  --    Putting on and taking off regular upper body clothing  --  3  -BB  --    Taking care of  personal grooming (such as brushing teeth)  --  3  -BB  --    Eating meals  --  3  -BB  --    Score  --  18  -BB  --       Functional Assessment    Outcome Measure Options  AM-PAC 6 Clicks Basic Mobility (PT)  -TW  --  AM-PAC 6 Clicks Basic Mobility (PT)  -TW    Row Name 04/30/19 1600             How much help from another person do you currently need...    Turning from your back to your side while in flat bed without using bedrails?  3  -TA      Moving from lying on back to sitting on the side of a flat bed without bedrails?  3  -TA      Moving to and from a bed to a chair (including a wheelchair)?  2  -TA      Standing up from a chair using your arms (e.g., wheelchair, bedside chair)?  2  -TA      Climbing 3-5 steps with a railing?  2  -TA      To walk in hospital room?  2  -TA      AM-PAC 6 Clicks Score  14  -TA         Functional Assessment    Outcome Measure Options  AM-PAC 6 Clicks Basic Mobility (PT)  -TA        User Key  (r) = Recorded By, (t) = Taken By, (c) = Cosigned By    Initials Name Provider Type    TA Kaur Mitchell, PTA Physical Therapy Assistant    TW Suleman Kohli, PTA Physical Therapy Assistant    BB Netta Robles, RESENDEZ/L Occupational Therapy Assistant           Time Calculation:   Time Calculation- OT     Row Name 05/03/19 1511             Time Calculation- OT    OT Start Time  1440  -LM      OT Stop Time  1505  -LM      OT Time Calculation (min)  25 min  -LM      Total Timed Code Minutes- OT  25 minute(s)  -LM      OT Received On  05/03/19  -LM        User Key  (r) = Recorded By, (t) = Taken By, (c) = Cosigned By    Initials Name Provider Type    LM Salma Pizarro COTA/L Occupational Therapy Assistant        Therapy Charges for Today     Code Description Service Date Service Provider Modifiers Qty    48945062229 HC OT THER PROC EA 15 MIN 5/3/2019 Salma Pizarro COTA/L GO 1    32467083742 HC OT THERAPEUTIC ACT EA 15 MIN 5/3/2019 Salma Pizarro COTA/KASEY GO 1                Salma Pizarro, RESENDEZ/KASEY  5/3/2019

## 2019-05-03 NOTE — PROGRESS NOTES
5/3/2019    Chief Complaint: suicidal ideation, physical aggression and psychosis    Subjective:    Mr. Sg Vega is a 53 yr old male that is inpatient on the [facility].   Today Sg is seen in the [facility] area. He continues to tell jokes about his feet and knuckles.    He does look agitated, but does not report any this day.   Sg is distracted easily and gets off topic at times.  He isn't able to have a reality based conversation longer than a few minutes today.   No reports of GI distress or adverse reaction to medicaitons    Objective     Vital Signs    Temp:  [98.2 °F (36.8 °C)-98.5 °F (36.9 °C)] 98.2 °F (36.8 °C)  Heart Rate:  [78-88] 78  Resp:  [18] 18  BP: (105-106)/(56-66) 105/66    Physical Exam:   General Appearance: alert and cooperative,  Hygiene:   fair  Gait & Station: Wheelchair  Musculoskeletal: No tremors or abnormal involuntary movements    Mental Status Exam:   Cooperation:  Cooperative  Eye Contact:  Fair  Psychomotor Behavior:  Slow  Mood: Apathetic  Affect:  flat  Speech:  Normal  Thought Process:  Poverty of thought  Associations: Flight of Ideas  Thought Content:     Mood congurent   Suicidal:  None   Homicidal:  None   Hallucinations:  Auditory   Delusion:  Paranoid  Cognitive Functioning:   Consciousness: awake and alert  Reliability:  poor  Insight:  Poor  Judgement:  Impaired  Impulse Control:  Impaired    Lab Results (last 24 hours)     ** No results found for the last 24 hours. **        Imaging Results (last 24 hours)     ** No results found for the last 24 hours. **          Medicine:   Current Facility-Administered Medications:   •  acetaminophen (TYLENOL) tablet 650 mg, 650 mg, Oral, Q4H PRN, Jolly Gamez MD  •  aluminum-magnesium hydroxide-simethicone (MAALOX MAX) 400-400-40 MG/5ML suspension 15 mL, 15 mL, Oral, Q6H PRN, Jolly Gamez MD  •  amLODIPine (NORVASC) tablet 10 mg, 10 mg, Oral, Daily, Mery Cronin APRN, 10 mg at 05/03/19 0821  •  aspirin  chewable tablet 81 mg, 81 mg, Oral, Daily, Jolly Gamez MD, 81 mg at 05/03/19 0821  •  atorvastatin (LIPITOR) tablet 10 mg, 10 mg, Oral, Daily, Jolly Gamez MD, 10 mg at 05/03/19 0821  •  CloNIDine (CATAPRES) tablet 0.1 mg, 0.1 mg, Oral, Q4H PRN, Jolly Gmaez MD  •  cyclobenzaprine (FLEXERIL) tablet 10 mg, 10 mg, Oral, TID, Jolly Gamez MD, 10 mg at 05/03/19 0821  •  ergocalciferol (DRISDOL) 8000 UNIT/ML drops 2,000 Units, 2,000 Units, Oral, Daily, Jolly Gamez MD, 2,000 Units at 05/03/19 0824  •  folic acid (FOLVITE) tablet 1 mg, 1 mg, Oral, Daily, Jolly Gamez MD, 1 mg at 05/03/19 0822  •  hydrOXYzine pamoate (VISTARIL) capsule 50 mg, 50 mg, Oral, Q6H PRN, Jolly Gamez MD  •  lisinopril (PRINIVIL,ZESTRIL) tablet 10 mg, 10 mg, Oral, Daily, Jolly Gamez MD, 10 mg at 05/03/19 0821  •  loperamide (IMODIUM) capsule 2 mg, 2 mg, Oral, 4x Daily PRN, Jolly Gamez MD  •  magnesium hydroxide (MILK OF MAGNESIA) suspension 2400 mg/10mL 10 mL, 10 mL, Oral, Daily PRN, Jolly Gamez MD  •  melatonin tablet 3 mg, 3 mg, Oral, Nightly, Jolly Gamez MD, 3 mg at 05/02/19 2054  •  nitroglycerin (NITROSTAT) SL tablet 0.4 mg, 0.4 mg, Sublingual, Q5 Min PRN, Jolly Gamez MD  •  pantoprazole (PROTONIX) EC tablet 40 mg, 40 mg, Oral, QAM, Jolly Gamez MD, 40 mg at 05/03/19 0853  •  polyethylene glycol (MIRALAX) powder 17 g, 17 g, Oral, Daily, Jolly Gamez MD, 17 g at 05/02/19 0823  •  risperiDONE (risperDAL) tablet 1 mg, 1 mg, Oral, Q12H, Jolly Gamez MD, 1 mg at 05/03/19 0821  •  [COMPLETED] sertraline (ZOLOFT) tablet 25 mg, 25 mg, Oral, Daily, 25 mg at 04/29/19 1521 **FOLLOWED BY** sertraline (ZOLOFT) tablet 100 mg, 100 mg, Oral, Daily, Jolly Gamez MD, 100 mg at 05/03/19 0821  •  tamsulosin (FLOMAX) 24 hr capsule 0.4 mg, 0.4 mg, Oral, Nightly, Jolly Gamez MD, 0.4 mg at 05/02/19 2054  •  traZODone (DESYREL) tablet 50 mg,  50 mg, Oral, Nightly PRN, Jolly Gamez MD, 50 mg at 05/02/19 2054  •  ziprasidone (GEODON) injection 10 mg, 10 mg, Intramuscular, Daily PRN, Mery Cronin APRN, 10 mg at 05/01/19 1702    Diagnoses/Assessment:     Dementia with behavioral disturbance    Severe episode of recurrent major depressive disorder, with psychotic features (CMS/HCC)    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  2) Will continue to provide treatment with the unit milieu, activities, therapies and psychopharmacological management.  3) Patient to be placed on or continued on  Q15 minute checks  and Suicide and Aggression precautions.  4) Pertinent medical issues: none  5) Will order following labs: none  6) Will make the following medication changes:  --cont risperdone 1mg twice a day  --continue zoloft 100mg daily  7) Will continue discharge planning as appropriate for patient.  8) Psychotherapy provided for less than 16 minutes.    Treatment plan and medication risks and benefits discussed with: Patient    FERNANDO Esteban  05/03/19  2:08 PM

## 2019-05-03 NOTE — PLAN OF CARE
Problem: Patient Care Overview  Goal: Plan of Care Review  Outcome: Ongoing (interventions implemented as appropriate)   05/03/19 1531   Coping/Psychosocial   Plan of Care Reviewed With patient   Coping/Psychosocial   Patient Agreement with Plan of Care agrees   Plan of Care Review   Progress improving   OTHER   Outcome Summary improving toawdr all goals cont toward strength adl perf

## 2019-05-03 NOTE — SIGNIFICANT NOTE
05/03/19 1315   Rehab Treatment   Discipline physical therapy assistant   Reason Treatment Not Performed patient/family declined treatment  (Nsg refused for pt again this pm.)

## 2019-05-03 NOTE — PLAN OF CARE
Problem: Patient Care Overview  Goal: Plan of Care Review  Outcome: Ongoing (interventions implemented as appropriate)      Problem: Overarching Goals (Adult)  Goal: Adheres to Safety Considerations for Self and Others  Outcome: Ongoing (interventions implemented as appropriate)    Goal: Optimized Coping Skills in Response to Life Stressors  Outcome: Ongoing (interventions implemented as appropriate)    Goal: Develops/Participates in Therapeutic [place] to Support Successful Transition  Outcome: Ongoing (interventions implemented as appropriate)      Problem: Skin Injury Risk (Adult)  Goal: Skin Health and Integrity  Outcome: Ongoing (interventions implemented as appropriate)  Skin integrity maintained.    Problem: Fall Risk (Adult)  Goal: Absence of Fall  Outcome: Ongoing (interventions implemented as appropriate)  Fall prevention intervention maintained    Problem: Violence, Risk/Actual (Adult)  Goal: Impulse Control  Outcome: Ongoing (interventions implemented as appropriate)  Patient is exhibiting good impulse and behavior control  Goal: Anger Control  Outcome: Outcome(s) achieved Date Met: 05/03/19      Problem: Cognitive Impairment (Dementia Signs/Symptoms) (Adult)  Goal: Optimized Cognitive Function (Dementia Signs/Symptoms)  Outcome: Ongoing (interventions implemented as appropriate)      Problem: Behavioral Impairment (Dementia Signs/Symptoms) (Adult)  Goal: Improved Behavioral Control (Dementia Signs/Symptoms)  Outcome: Ongoing (interventions implemented as appropriate)

## 2019-05-03 NOTE — PLAN OF CARE
Problem: Patient Care Overview  Goal: Plan of Care Review  Outcome: Ongoing (interventions implemented as appropriate)      Problem: Overarching Goals (Adult)  Goal: Adheres to Safety Considerations for Self and Others  Outcome: Ongoing (interventions implemented as appropriate)    Goal: Optimized Coping Skills in Response to Life Stressors  Outcome: Ongoing (interventions implemented as appropriate)    Goal: Develops/Participates in Therapeutic [place] to Support Successful Transition  Outcome: Ongoing (interventions implemented as appropriate)      Problem: Behavioral Impairment (Dementia Signs/Symptoms) (Adult)  Goal: Improved Behavioral Control (Dementia Signs/Symptoms)  Outcome: Ongoing (interventions implemented as appropriate)  Patient exhibited no behaviors tonight, went to bed early.

## 2019-05-04 PROCEDURE — 97110 THERAPEUTIC EXERCISES: CPT

## 2019-05-04 PROCEDURE — 99232 SBSQ HOSP IP/OBS MODERATE 35: CPT | Performed by: PSYCHIATRY & NEUROLOGY

## 2019-05-04 RX ADMIN — CYCLOBENZAPRINE HYDROCHLORIDE 10 MG: 10 TABLET, FILM COATED ORAL at 16:22

## 2019-05-04 RX ADMIN — ERGOCALCIFEROL SOLN 200 MCG/ML (8000 UNIT/ML) 2000 UNITS: 8000 SOLUTION at 08:31

## 2019-05-04 RX ADMIN — FOLIC ACID 1 MG: 1 TABLET ORAL at 08:30

## 2019-05-04 RX ADMIN — CYCLOBENZAPRINE HYDROCHLORIDE 10 MG: 10 TABLET, FILM COATED ORAL at 08:30

## 2019-05-04 RX ADMIN — SERTRALINE HYDROCHLORIDE 100 MG: 50 TABLET ORAL at 08:30

## 2019-05-04 RX ADMIN — TRAZODONE HYDROCHLORIDE 50 MG: 50 TABLET ORAL at 20:37

## 2019-05-04 RX ADMIN — ASPIRIN 81 MG CHEWABLE TABLET 81 MG: 81 TABLET CHEWABLE at 08:34

## 2019-05-04 RX ADMIN — POLYETHYLENE GLYCOL 3350 17 G: 17 POWDER, FOR SOLUTION ORAL at 08:31

## 2019-05-04 RX ADMIN — CYCLOBENZAPRINE HYDROCHLORIDE 10 MG: 10 TABLET, FILM COATED ORAL at 20:37

## 2019-05-04 RX ADMIN — MELATONIN 3 MG: at 20:37

## 2019-05-04 RX ADMIN — PANTOPRAZOLE SODIUM 40 MG: 40 TABLET, DELAYED RELEASE ORAL at 08:30

## 2019-05-04 RX ADMIN — RISPERIDONE 1 MG: 1 TABLET ORAL at 08:31

## 2019-05-04 RX ADMIN — ATORVASTATIN CALCIUM 10 MG: 10 TABLET, FILM COATED ORAL at 08:30

## 2019-05-04 RX ADMIN — TAMSULOSIN HYDROCHLORIDE 0.4 MG: 0.4 CAPSULE ORAL at 20:37

## 2019-05-04 RX ADMIN — RISPERIDONE 1 MG: 1 TABLET ORAL at 20:37

## 2019-05-04 NOTE — NURSING NOTE
Behavior   Note any precipitants to event or behavior   Describe level and action of any aggressive behavior or speech and associated interventions.     Anxiety: Restless/Edgy and Decreased concentration  Depression: fatigue  Pain  0  AVH   no  S/I   no  Plan  no  H/I   no  Plan  no    Affect   inappropriate      Note:pt sitting at the table with peers eating breakfast, discussing with peers going from uncles house to home by bike, pt stated it is about 150 miles and he needs to get going, pt stated when asked he is at the hospital  But not sure which one, re oriented  pt willing to take medicines as ordered, assisted to room      Intervention    PRN medication utilized:  no    Instructed in medication usage and effects  Medications administered as ordered  Encouraged to verbalize needs      Response    Verbalized understanding   Did patient take medications as ordered yes   Did patient interact with assessment?  yes     Plan    Will monitor for safety  Will monitor every 15 minutes as ordered  Will evaluate and promote the plan of care    Last BM:  05/03/2019

## 2019-05-04 NOTE — THERAPY TREATMENT NOTE
Acute Care - Occupational Therapy Treatment Note  [facility]     Patient Name: Sg Vega  : 1965  MRN: [id number]  Today's Date: 2019  Onset of Illness/Injury or Date of Surgery: 19  Date of Referral to OT: 19  Referring Physician: FERNANDO Gutierrez    Admit Date: 2019       ICD-10-CM ICD-9-CM   1. Oral phase dysphagia R13.11 787.21   2. Impaired functional mobility, balance, and endurance Z74.09 V49.89   3. 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 No pertinent surgical history.    Therapy Treatment    Rehabilitation Treatment Summary     Row Name 19 1015             Treatment Time/Intention    Discipline  occupational therapy assistant  -LW      Document Type  therapy note (daily note)  -LW      Subjective Information  no complaints  -LW      Mode of Treatment  occupational therapy  -LW      Patient/Family Observations  Pt supine in bed. Nsg ok'd tx.   -LW      Total Minutes, Occupational Therapy Treatment  25  -LW      Therapy Frequency (OT Eval)  other (see comments) 5-7 days per week  -LW      Patient Effort  good  -LW      Existing Precautions/Restrictions  fall;other (see comments) behavorial, aggressive, paranoia.  -LW      Recorded by [LW] Darcie Guaman,  DIPTI/L 05/04/19 1335      Row Name 05/04/19 1015             Vital Signs    Intratreatment Heart Rate (beats/min)  79  -LW      Pretreatment Resp Rate (breaths/min)  81  -LW      Pre SpO2 (%)  97  -LW      O2 Delivery Pre Treatment  room air  -LW      Post SpO2 (%)  97  -LW      O2 Delivery Post Treatment  room air  -LW      Pre Patient Position  Supine  -LW      Post Patient Position  Sitting  -LW      Recorded by [LW] Darcie Guaman COTA/L 05/04/19 1335      Row Name 05/04/19 1015             Cognitive Assessment/Intervention- PT/OT    Affect/Mental Status (Cognitive)  flat/blunted affect  -LW      Orientation Status (Cognition)  oriented to;person  -LW      Follows Commands (Cognition)  follows one step commands;25-49% accuracy  -LW      Cognitive Function (Cognitive)  safety deficit  -LW      Safety Deficit (Cognitive)  moderate deficit  -LW      Personal Safety Interventions  fall prevention program maintained;gait belt;nonskid shoes/slippers when out of bed  -LW      Recorded by [LW] Darcie Guaman COTA/L 05/04/19 1335      Row Name 05/04/19 1015             Bed Mobility Assessment/Treatment    Bed Mobility Assessment/Treatment  supine-sit;sit-supine  -LW      Supine-Sit [place] (Bed Mobility)  supervision  -LW      Sit-Supine [place] (Bed Mobility)  supervision  -LW      Assistive Device (Bed Mobility)  bed rails  -LW      Recorded by [LW] Darcie Guaman COTA/L 05/04/19 1335      Row Name 05/04/19 1015             Therapeutic Exercise    Upper Extremity Range of Motion (Therapeutic Exercise)  shoulder flexion/extension, bilateral;shoulder internal/external rotation, bilateral;shoulder horizontal abduction/adduction, bilateral;elbow flexion/extension, bilateral;forearm supination/pronation, bilateral;wrist flexion/extension, bilateral  -LW      Hand (Therapeutic Exercise)  finger flexion/extension, bilateral  -LW      Exercise Type (Therapeutic Exercise)  AROM (active range of motion)  -LW       Position (Therapeutic Exercise)  seated  -LW      Sets/Reps (Therapeutic Exercise)  15X  -LW      Expected Outcome (Therapeutic Exercise)  improve functional tolerance, self-care activity  -LW      Recorded by [LW] Darcie Guaman COTA/L 05/04/19 1335      Row Name 05/04/19 1015             Positioning and Restraints    Pre-Treatment Position  in bed  -LW      Post Treatment Position  bed  -LW      In Bed  supine;call light within reach;encouraged to call for assist;exit alarm on  -LW      Recorded by [LW] Darcie Guaman COTA/L 05/04/19 1338      Row Name 05/04/19 1015             Pain Scale: Numbers Pre/Post-Treatment    Pain Scale: Numbers, Pretreatment  0/10 - no pain  -LW      Pain Scale: Numbers, Post-Treatment  0/10 - no pain  -LW      Recorded by [LW] Darcie Guaman COTA/L 05/04/19 1335      Row Name 05/04/19 1015             Coping    Observed Emotional State  agitated  -LW      Verbalized Emotional State  acceptance  -LW      Recorded by [LW] Darcie Guaman COTA/L 05/04/19 1335      Row Name 05/04/19 1015             Plan of Care Review    Plan of Care Reviewed With  patient  -LW      Recorded by [LW] Darcie Guaman COTA/L 05/04/19 1335      Row Name 05/04/19 1015             Outcome Summary/Treatment Plan (OT)    Daily Summary of Progress (OT)  progress toward functional goals as expected  -LW      Plan for Continued Treatment (OT)  Continue POC  -LW      Anticipated Discharge Disposition (OT)  skilled nursing facility  -LW      Recorded by [LW] Darcie Guaman COTA/KASEY 05/04/19 1335        User Key  (r) = Recorded By, (t) = Taken By, (c) = Cosigned By    Initials Name Effective Dates Discipline    LW Darcie Guaman COTA/L 03/07/18 -  OT           Rehab Goal Summary     Row Name 05/04/19 1015             Occupational Therapy Goals    Transfer Goal Selection (OT)  transfer, OT goal 1  -LW      Toileting Goal Selection (OT)  toileting, OT goal 1  -LW      Self-Feeding Goal Selection (OT)   self feeding, OT goal 1  -LW      Strength Goal Selection (OT)  strength, OT goal 1  -LW      Coordination Goal Selection (OT)  coordination, OT goal 1  -LW         Transfer Goal 1 (OT)    Activity/Assistive Device (Transfer Goal 1, OT)  sit-to-stand/stand-to-sit;bed-to-chair/chair-to-bed;toilet;walker, rolling  -LW      [place] Level/Cues Needed (Transfer Goal 1, OT)  contact guard assist  -LW      Time Frame (Transfer Goal 1, OT)  long term goal (LTG);by discharge  -LW      Progress/Outcome (Transfer Goal 1, OT)  goal not met  -LW         Toileting Goal 1 (OT)    Activity/Device (Toileting Goal 1, OT)  toileting skills, all  -LW      [place] Level/Cues Needed (Toileting Goal 1, OT)  contact guard assist  -LW      Time Frame (Toileting Goal 1, OT)  long term goal (LTG);by discharge  -LW      Progress/Outcome (Toileting Goal 1, OT)  goal not met  -LW         Self-Feeding Goal 1 (OT)    Activity/Assistive Device (Self-Feeding Goal 1, OT)  self-feeding skills, all  -LW      [place] Level/Cues Needed (Self-Feeding Goal 1, OT)  set-up required;supervision required  -LW      Time Frame (Self-Feeding Goal 1, OT)  long term goal (LTG);by discharge  -LW      Progress/Outcomes (Self-Feeding Goal 1, OT)  goal not met  -LW         Strength Goal 1 (OT)    Strength Goal 1 (OT)  Pt will complete BUE AROM exercises in all planes to increase strength by 1/2 grade to increase functional independence with ADLs.   -LW      Time Frame (Strength Goal 1, OT)  long term goal (LTG);by discharge  -LW      Progress/Outcome (Strength Goal 1, OT)  goal not met  -LW         Coordination Goal 1 (OT)    Activity/Assistive Device (Coordination Goal 1, OT)  GM task;FM task  -LW      [place] Level/Cues Needed (Coordination Goal 1, OT)  minimum assist (75% or more patient effort);verbal cues required  -LW      Time Frame (Coordination Goal 1, OT)  long term goal (LTG);by discharge  -LW      Progress/Outcomes (Coordination Goal 1,  OT)  goal not met  -        User Key  (r) = Recorded By, (t) = Taken By, (c) = Cosigned By    Initials Name Provider Type Discipline    Darcie Muse COTA/L Occupational Therapy Assistant OT        Occupational Therapy Education     Title: PT OT SLP Therapies (Done)     Topic: Occupational Therapy (Done)     Point: ADL training (Done)     Description: Instruct learner(s) on proper safety adaptation and remediation techniques during self care or transfers.   Instruct in proper use of assistive devices.    Learning Progress Summary           Patient Acceptance, E,TB, VU by CLINT at 5/4/2019  1:36 PM    Acceptance, E, VU by BB at 4/29/2019  2:52 PM    Acceptance, E,TB, VU by MR at 4/28/2019  4:30 PM    Comment:  Role of OT and POC. Benefit of activity                   Point: Home exercise program (Done)     Description: Instruct learner(s) on appropriate technique for monitoring, assisting and/or progressing therapeutic exercises/activities.    Learning Progress Summary           Patient Acceptance, E,TB, VU by LW at 5/4/2019  1:36 PM                   Point: Precautions (Done)     Description: Instruct learner(s) on prescribed precautions during self-care and functional transfers.    Learning Progress Summary           Patient Acceptance, E,TB, VU by LW at 5/4/2019  1:36 PM    Acceptance, E,TB, VU by MR at 4/28/2019  4:30 PM    Comment:  Role of OT and POC. Benefit of activity                   Point: Body mechanics (Done)     Description: Instruct learner(s) on proper positioning and spine alignment during self-care, functional mobility activities and/or exercises.    Learning Progress Summary           Patient Acceptance, E,TB, VU by LW at 5/4/2019  1:36 PM    Acceptance, E,TB, VU by MR at 4/28/2019  4:30 PM    Comment:  Role of OT and POC. Benefit of activity                               User Key     Initials Effective Dates Name Provider Type Discipline    DEE 03/07/18 -  Netta Robles COTA/L  Occupational Therapy Assistant OT    LW 03/07/18 -  Darcie Guaman COTA/L Occupational Therapy Assistant OT    MR 04/03/18 -  Romina Marshall OT Occupational Therapist OT                OT Recommendation and Plan  Outcome Summary/Treatment Plan (OT)  Daily Summary of Progress (OT): progress toward functional goals as expected  Plan for Continued Treatment (OT): Continue POC  Anticipated Discharge Disposition (OT): skilled nursing facility  Therapy Frequency (OT Eval): other (see comments)(5-7 days per week)  Daily Summary of Progress (OT): progress toward functional goals as expected  Plan of Care Review  Plan of Care Reviewed With: patient  Plan of Care Reviewed With: patient  Outcome Summary: Pt seeming agitated this day. Pt did agree to work on UB strengthening for independence with ADL's  Outcome Measures     Row Name 05/04/19 1015 05/03/19 1540 05/02/19 1020       How much help from another person do you currently need...    Turning from your back to your side while in flat bed without using bedrails?  --  3  -TW  3  -TW    Moving from lying on back to sitting on the side of a flat bed without bedrails?  --  3  -TW  3  -TW    Moving to and from a bed to a chair (including a wheelchair)?  --  2  -TW  2  -TW    Standing up from a chair using your arms (e.g., wheelchair, bedside chair)?  --  2  -TW  2  -TW    Climbing 3-5 steps with a railing?  --  2  -TW  2  -TW    To walk in hospital room?  --  2  -TW  2  -TW    AM-PAC 6 Clicks Score  --  14  -TW  14  -TW       How much help from another is currently needed...    Putting on and taking off regular lower body clothing?  3  -LW  --  --    Bathing (including washing, rinsing, and drying)  3  -LW  --  --    Toileting (which includes using toilet bed pan or urinal)  3  -LW  --  --    Putting on and taking off regular upper body clothing  3  -LW  --  --    Taking care of personal grooming (such as brushing teeth)  3  -LW  --  --    Eating meals  3  -LW  --  --     Score  18  -LW  --  --       Functional Assessment    Outcome Measure Options  --  AM-PAC 6 Clicks Basic Mobility (PT)  -TW  AM-PAC 6 Clicks Basic Mobility (PT)  -TW    Row Name 05/02/19 0825 05/01/19 1438          How much help from another person do you currently need...    Turning from your back to your side while in flat bed without using bedrails?  --  3  -TW     Moving from lying on back to sitting on the side of a flat bed without bedrails?  --  3  -TW     Moving to and from a bed to a chair (including a wheelchair)?  --  2  -TW     Standing up from a chair using your arms (e.g., wheelchair, bedside chair)?  --  2  -TW     Climbing 3-5 steps with a railing?  --  2  -TW     To walk in hospital room?  --  2  -TW     AM-PAC 6 Clicks Score  --  14  -TW        How much help from another is currently needed...    Putting on and taking off regular lower body clothing?  3  -BB  --     Bathing (including washing, rinsing, and drying)  3  -BB  --     Toileting (which includes using toilet bed pan or urinal)  3  -BB  --     Putting on and taking off regular upper body clothing  3  -BB  --     Taking care of personal grooming (such as brushing teeth)  3  -BB  --     Eating meals  3  -BB  --     Score  18  -BB  --        Functional Assessment    Outcome Measure Options  --  AM-PAC 6 Clicks Basic Mobility (PT)  -TW       User Key  (r) = Recorded By, (t) = Taken By, (c) = Cosigned By    Initials Name Provider Type    TW Suleman Kohli, KAL Physical Therapy Assistant    BB Netta Robles, RESENDEZ/L Occupational Therapy Assistant    LW Darcie Guaman, RESENDEZ/L Occupational Therapy Assistant           Time Calculation:   Time Calculation- OT     Row Name 05/04/19 1338             Time Calculation- OT    OT Start Time  1015  -LW      OT Stop Time  1040  -LW      OT Time Calculation (min)  25 min  -LW      Total Timed Code Minutes- OT  25 minute(s)  -LW      OT Received On  05/04/19  -        User Key  (r) = Recorded  By, (t) = Taken By, (c) = Cosigned By    Initials Name Provider Type    LW Darcie Guaman COTA/L Occupational Therapy Assistant        Therapy Charges for Today     Code Description Service Date Service Provider Modifiers Qty    [id number] HC OT THER PROC EA 15 MIN 5/4/2019 Darcie Guaman COTA/L GO 2        Non-skid socks and gait belt in place. Toileting offered. Call light and needs within reach. Pt advised to not get up alone and call the nurse for assistance.  Bed alarm on.          DANIEL Anderson  5/4/2019

## 2019-05-04 NOTE — NURSING NOTE
Behavior   Note any precipitants to event or behavior   Describe level and action of any aggressive behavior or speech and associated interventions.     Anxiety: Restless/Edgy  Depression: Patient denies at this time  Pain  0  AVH   no  S/I   no  Plan  no  H/I   no  Plan  no    Affect   labile      Note: As previously documented patient was very agitated, attempting to enter other patient rooms, attempting to open the door between the [place] and adult side. Patient refused PM PO medications. While trying to escort patient to his room for injection administration patient attempted to propel wheelchair into signee. Patient also attempted to bite signee while attempting to administer IM injection. Charge nurse decided at that time to call security for assistance. As written in other nursing note, IM geodon administered with security present and without further issue. Geodon appears to have been effective. Patient is resting in bed.       Intervention    PRN medication utilized:  yes - Geodon for agitation    Instructed in medication usage and effects  Medications administered as ordered  Encouraged to verbalize needs      Response    Verbalized understanding   Did patient take medications as ordered no  Did patient interact with assessment?  no    Plan    Will monitor for safety  Will monitor every 15 minutes as ordered  Will evaluate and promote the plan of care    Last BM:  unknown date  (Please chart in I/O as well)

## 2019-05-04 NOTE — PLAN OF CARE
Problem: Patient Care Overview  Goal: Plan of Care Review  Outcome: Ongoing (interventions implemented as appropriate)   05/04/19 0333   Coping/Psychosocial   Plan of Care Reviewed With patient   Plan of Care Review   Progress no change   OTHER   Outcome Summary Pt seeming agitated this day. Pt did agree to work on UB strengthening for independence with ADL's

## 2019-05-04 NOTE — NURSING NOTE
Patient is wheeling himself around and opening up doors to patient rooms and closing them and attempting to open the doors between the geriatric unit and adult unit. Attempting to redirect patient. Will continue to monitor.

## 2019-05-04 NOTE — PLAN OF CARE
Problem: Patient Care Overview  Goal: Plan of Care Review  Outcome: Ongoing (interventions implemented as appropriate)    Goal: Individualization and Mutuality  Outcome: Ongoing (interventions implemented as appropriate)    Goal: Discharge Needs Assessment  Outcome: Ongoing (interventions implemented as appropriate)    Goal: Interprofessional Rounds/Family Conf  Outcome: Ongoing (interventions implemented as appropriate)      Problem: Overarching Goals (Adult)  Goal: Adheres to Safety Considerations for Self and Others  Outcome: Ongoing (interventions implemented as appropriate)    Goal: Optimized Coping Skills in Response to Life Stressors  Outcome: Ongoing (interventions implemented as appropriate)    Goal: Develops/Participates in Therapeutic [place] to Support Successful Transition  Outcome: Ongoing (interventions implemented as appropriate)      Problem: Skin Injury Risk (Adult)  Goal: Skin Health and Integrity  Outcome: Ongoing (interventions implemented as appropriate)      Problem: Fall Risk (Adult)  Goal: Absence of Fall  Outcome: Ongoing (interventions implemented as appropriate)      Problem: Violence, Risk/Actual (Adult)  Goal: Impulse Control  Outcome: Ongoing (interventions implemented as appropriate)      Problem: Cognitive Impairment (Dementia Signs/Symptoms) (Adult)  Goal: Optimized Cognitive Function (Dementia Signs/Symptoms)  Outcome: Ongoing (interventions implemented as appropriate)      Problem: Behavioral Impairment (Dementia Signs/Symptoms) (Adult)  Goal: Improved Behavioral Control (Dementia Signs/Symptoms)  Outcome: Ongoing (interventions implemented as appropriate)

## 2019-05-04 NOTE — NURSING NOTE
IM Geodon administered in R ventral gluteal with security present. Patient stated we were trying to kill him. Redirected patient that we were trying to help. Patient tolerated IM injection well and is now resting in bed. Will continue to monitor and follow up.

## 2019-05-04 NOTE — NURSING NOTE
"Patient isn't redirectable. He is stating that he is allowed into others rooms. Attempted to give patient PO medications and he refused stating \"I don't take those.\" IM geodon pulled. Patient attempted to bite signee. Security en route.   "

## 2019-05-04 NOTE — PLAN OF CARE
Problem: Patient Care Overview  Goal: Plan of Care Review  Outcome: Ongoing (interventions implemented as appropriate)   05/03/19 2341   Coping/Psychosocial   Plan of Care Reviewed With patient   Coping/Psychosocial   Patient Agreement with Plan of Care agrees   Plan of Care Review   Progress no change     Goal: Individualization and Mutuality  Outcome: Ongoing (interventions implemented as appropriate)    Goal: Discharge Needs Assessment  Outcome: Ongoing (interventions implemented as appropriate)    Goal: Interprofessional Rounds/Family Conf  Outcome: Ongoing (interventions implemented as appropriate)      Problem: Overarching Goals (Adult)  Goal: Adheres to Safety Considerations for Self and Others  Outcome: Ongoing (interventions implemented as appropriate)   05/03/19 2341   Overarching Goals (Adult)   Adheres to Safety Considerations for Self and Others making progress toward outcome     Goal: Optimized Coping Skills in Response to Life Stressors  Outcome: Ongoing (interventions implemented as appropriate)   05/03/19 2341   Overarching Goals (Adult)   Optimized Coping Skills in Response to Life Stressors making progress toward outcome     Goal: Develops/Participates in Therapeutic [place] to Support Successful Transition  Outcome: Ongoing (interventions implemented as appropriate)   05/03/19 2341   Overarching Goals (Adult)   Develops/Participates in Therapeutic [place] to Support Successful Transition making progress toward outcome       Problem: Skin Injury Risk (Adult)  Goal: Skin Health and Integrity  Outcome: Ongoing (interventions implemented as appropriate)   05/03/19 2341   Skin Injury Risk (Adult)   Skin Health and Integrity making progress toward outcome       Problem: Fall Risk (Adult)  Goal: Absence of Fall  Outcome: Ongoing (interventions implemented as appropriate)   05/03/19 2341   Fall Risk (Adult)   Absence of Fall making progress toward outcome       Problem: Violence, Risk/Actual  (Adult)  Goal: Impulse Control  Outcome: Ongoing (interventions implemented as appropriate)   05/03/19 2341   Violence, Risk/Actual (Adult)   Impulse Control making progress toward outcome       Problem: Cognitive Impairment (Dementia Signs/Symptoms) (Adult)  Goal: Optimized Cognitive Function (Dementia Signs/Symptoms)  Outcome: Ongoing (interventions implemented as appropriate)   05/03/19 2341   Optimized Cognitive Function (Dementia Signs/Symptoms)   Optimized Cognitive Ability Action Step (STG) Outcome making progress toward outcome       Problem: Behavioral Impairment (Dementia Signs/Symptoms) (Adult)  Goal: Improved Behavioral Control (Dementia Signs/Symptoms)  Outcome: Ongoing (interventions implemented as appropriate)   05/03/19 2341   Improved Behavioral Control (Dementia Signs/Symptoms)   Improved Behavioral Control Action Step (STG) Outcome making progress toward outcome

## 2019-05-05 PROCEDURE — 99232 SBSQ HOSP IP/OBS MODERATE 35: CPT | Performed by: PSYCHIATRY & NEUROLOGY

## 2019-05-05 PROCEDURE — 97530 THERAPEUTIC ACTIVITIES: CPT

## 2019-05-05 PROCEDURE — 97535 SELF CARE MNGMENT TRAINING: CPT

## 2019-05-05 PROCEDURE — 97110 THERAPEUTIC EXERCISES: CPT

## 2019-05-05 RX ORDER — RISPERIDONE 1 MG/1
1 TABLET ORAL DAILY
Status: DISCONTINUED | OUTPATIENT
Start: 2019-05-05 | End: 2019-05-07 | Stop reason: HOSPADM

## 2019-05-05 RX ADMIN — ASPIRIN 81 MG CHEWABLE TABLET 81 MG: 81 TABLET CHEWABLE at 08:16

## 2019-05-05 RX ADMIN — MELATONIN 3 MG: at 20:48

## 2019-05-05 RX ADMIN — POLYETHYLENE GLYCOL 3350 17 G: 17 POWDER, FOR SOLUTION ORAL at 08:15

## 2019-05-05 RX ADMIN — TAMSULOSIN HYDROCHLORIDE 0.4 MG: 0.4 CAPSULE ORAL at 20:48

## 2019-05-05 RX ADMIN — ERGOCALCIFEROL SOLN 200 MCG/ML (8000 UNIT/ML) 2000 UNITS: 8000 SOLUTION at 08:15

## 2019-05-05 RX ADMIN — CYCLOBENZAPRINE HYDROCHLORIDE 10 MG: 10 TABLET, FILM COATED ORAL at 08:15

## 2019-05-05 RX ADMIN — ATORVASTATIN CALCIUM 10 MG: 10 TABLET, FILM COATED ORAL at 08:16

## 2019-05-05 RX ADMIN — RISPERIDONE 1 MG: 1 TABLET ORAL at 08:16

## 2019-05-05 RX ADMIN — FOLIC ACID 1 MG: 1 TABLET ORAL at 08:15

## 2019-05-05 RX ADMIN — CYCLOBENZAPRINE HYDROCHLORIDE 10 MG: 10 TABLET, FILM COATED ORAL at 20:48

## 2019-05-05 RX ADMIN — CYCLOBENZAPRINE HYDROCHLORIDE 10 MG: 10 TABLET, FILM COATED ORAL at 15:34

## 2019-05-05 RX ADMIN — RISPERIDONE 1 MG: 0.5 TABLET ORAL at 15:34

## 2019-05-05 RX ADMIN — SERTRALINE HYDROCHLORIDE 100 MG: 50 TABLET ORAL at 08:16

## 2019-05-05 RX ADMIN — TRAZODONE HYDROCHLORIDE 50 MG: 50 TABLET ORAL at 20:49

## 2019-05-05 RX ADMIN — AMLODIPINE BESYLATE 10 MG: 10 TABLET ORAL at 08:16

## 2019-05-05 RX ADMIN — PANTOPRAZOLE SODIUM 40 MG: 40 TABLET, DELAYED RELEASE ORAL at 08:15

## 2019-05-05 RX ADMIN — RISPERIDONE 1.5 MG: 0.5 TABLET ORAL at 15:33

## 2019-05-05 RX ADMIN — LISINOPRIL 10 MG: 10 TABLET ORAL at 08:16

## 2019-05-05 NOTE — THERAPY TREATMENT NOTE
Acute Care - Physical Therapy Treatment Note  [facility]     Patient Name: Sg Vega  : 1965  MRN: [id number]  Today's Date: 2019  Onset of Illness/Injury or Date of Surgery: 19  Date of Referral to PT: 19  Referring Physician: FERNANDO Gutierrez    Admit Date: 2019    Visit Dx:    ICD-10-CM ICD-9-CM   1. Oral phase dysphagia R13.11 787.21   2. Impaired functional mobility, balance, and endurance Z74.09 V49.89   3. 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 sitting up in w[place]c [place] [facility]. station  -JA      Therapy Frequency (PT Clinical Impression)  daily  -JA      Patient Effort  good  -JA      Existing Precautions/Restrictions  fall;other (see comments) behavorial, aggressive, paranoia.  -JA      Recorded by [DENICE] Candelario Lange, PTA 19 1133      Row Name 19 1000             Vital Signs    Pre Systolic BP Rehab  107  -JA      Pre Treatment Diastolic BP  66  -JA      Post Systolic BP Rehab  120  -JA      Post Treatment Diastolic BP  68  -JA      Pretreatment Heart Rate (beats/min)  98  -JA      Posttreatment Heart Rate (beats/min)  95  -JA      Pre SpO2 (%)  98  -JA      O2 Delivery Pre Treatment  room air  -JA      Pre Patient Position  Sitting  -JA      Intra Patient Position  Standing  -JA      Post Patient Position  Sitting  -JA       "Recorded by [JA] Candelario Lange, PTA 05/05/19 1133      Row Name 05/05/19 1000             Cognitive Assessment/Intervention- PT/OT    Affect/Mental Status (Cognitive)  flat/blunted affect  -JA      Orientation Status (Cognition)  oriented to;person  -JA      Follows Commands (Cognition)  follows one step commands  -JA      Cognitive Function (Cognitive)  safety deficit  -JA      Recorded by [JA] Candelario Lange, PTA 05/05/19 1133      Row Name 05/05/19 1000             Sit-Stand Transfer    Sit-Stand [place] (Transfers)  minimum assist (75% patient effort)  -DENICE      Assistive Device (Sit-Stand Transfers)  walker, front-wheeled Nsg. ok'd use this a.m.   -JA      Recorded by [JA] Candelario Lange, PTA 05/05/19 1133      Row Name 05/05/19 1000             Stand-Sit Transfer    Stand-Sit [place] (Transfers)  minimum assist (75% patient effort)  -DENICE      Assistive Device (Stand-Sit Transfers)  walker, front-wheeled  -JA      Recorded by [JA] Candelario Lange, PTA 05/05/19 1133      Row Name 05/05/19 1000             Gait/Stairs Assessment/Training    Gait/Stairs Assessment/Training  gait/ambulation independence  -DENICE      [place] Level (Gait)  moderate assist (50% patient effort);1 person assist;1 person to manage equipment CNA followed with w/c for safety  -JA      Assistive Device (Gait)  walker, front-wheeled  -DENICE      Distance in Feet (Gait)  18'  -JA      Deviations/Abnormal Patterns (Gait)  base of support, narrow;ataxic Pt. with c/o's \" my feet feel numb\"  -JA      Bilateral Gait Deviations  heel strike decreased;weight shift ability decreased B knees slightly flexed with performance and weak  -JA2      Comment (Gait/Stairs)  Pt. stood with RW x1 ~1-2mins then sat in w/c after encouragement pt. agreeable to attempt a gt. attempt this a.m.   -JA2      Recorded by [JA] Candelario Lange, PTA 05/05/19 1133  [JA2] Candelario Lange, PTA 05/05/19 1137      Row Name 05/05/19 1000       "       Therapeutic Exercise    Lower Extremity (Therapeutic Exercise)  LAQ (long arc quad), bilateral;marching while seated  -JA      Lower Extremity Range of Motion (Therapeutic Exercise)  ankle dorsiflexion/plantar flexion, bilateral  -JA      Position (Therapeutic Exercise)  seated  -JA      Sets/Reps (Therapeutic Exercise)  x15  -JA      Recorded by [DENICE] Candelario Lange, PTA 05/05/19 1133      Row Name 05/05/19 1000             Positioning and Restraints    Pre-Treatment Position  sitting in chair/recliner  -JA      Post Treatment Position  wheelchair  -JA      In Wheelchair  sitting;patient within staff [place] at [facility]. station  -JA      Recorded by [DENICE] Candelario Lange, PTA 05/05/19 1133      Row Name 05/05/19 1000             Pain Scale: Numbers Pre/Post-Treatment    Pain Scale: Numbers, Pretreatment  0/10 - no pain  -JA      Pain Scale: Numbers, Post-Treatment  0/10 - no pain  -JA      Recorded by [DENICE] Candelario Lange, PTA 05/05/19 1133      Row Name 05/05/19 1000             Outcome Summary/Treatment Plan (PT)    Daily Summary of Progress (PT)  progress toward functional goals is gradual  -      Plan for Continued Treatment (PT)  Cont. to mobilize as pt. able, if amb. needs to person assist for safety  -JA      Anticipated Discharge Disposition (PT)  skilled nursing facility  -JA      Recorded by [DENICE] Candelario Lange, PTA 05/05/19 1133        User Key  (r) = Recorded By, (t) = Taken By, (c) = Cosigned By    Initials Name Effective Dates Discipline     Candelario Lange, PTA 03/07/18 -  PT               Rehab Goal Summary     Row Name 05/05/19 1000             Physical Therapy Goals    Bed Mobility Goal Selection (PT)  bed mobility, PT goal 1  -      Transfer Goal Selection (PT)  transfer, PT goal 1  -      Gait Training Goal Selection (PT)  gait training, PT goal 1  -         Bed Mobility Goal 1 (PT)    Activity/Assistive Device (Bed Mobility Goal 1, PT)  sit to supine;supine to  sit;scooting  -JA      [place] Level/Cues Needed (Bed Mobility Goal 1, PT)  standby assist  -JA      Time Frame (Bed Mobility Goal 1, PT)  by discharge  -JA      Progress/Outcomes (Bed Mobility Goal 1, PT)  goal not met  -JA         Transfer Goal 1 (PT)    Activity/Assistive Device (Transfer Goal 1, PT)  bed-to-chair/chair-to-bed  -JA      [place] Level/Cues Needed (Transfer Goal 1, PT)  contact guard assist  -JA      Time Frame (Transfer Goal 1, PT)  by discharge  -JA      Progress/Outcome (Transfer Goal 1, PT)  goal not met  -JA         Gait Training Goal 1 (PT)    Activity/Assistive Device (Gait Training Goal 1, PT)  gait (walking locomotion);walker, rolling  -JA      [place] Level (Gait Training Goal 1, PT)  contact guard assist  -JA      Distance (Gait Goal 1, PT)  15' x 1  -JA      Time Frame (Gait Training Goal 1, PT)  by discharge  -JA      Barriers (Gait Training Goal 1, PT)  MS, agitation  -JA      Progress/Outcome (Gait Training Goal 1, PT)  goal not met  -JA        User Key  (r) = Recorded By, (t) = Taken By, (c) = Cosigned By    Initials Name Provider Type Discipline    Candelario Feliciano, PTA Physical Therapy Assistant PT              PT Recommendation and Plan  Anticipated Discharge Disposition (PT): skilled nursing facility  Therapy Frequency (PT Clinical Impression): daily  Outcome Summary/Treatment Plan (PT)  Daily Summary of Progress (PT): progress toward functional goals is gradual  Plan for Continued Treatment (PT): Cont. to mobilize as pt. able, if amb. needs to person assist for safety  Anticipated Discharge Disposition (PT): skilled nursing facility  Plan of Care Reviewed With: patient  Progress: no change  Outcome Summary: Pt. able to perform x15 reps seated therex, sit-stand-sit Mary & amb. with RW ModAx1 + 1 to follow with w/c for safety due to pt. very unsteady with gt. and ataxic with performance. Cont. to mobilize as pt. able   Outcome Measures     Row Name 05/05/19  1000 05/04/19 1015 05/03/19 1540       How much help from another person do you currently need...    Turning from your back to your side while in flat bed without using bedrails?  3  -JA  --  3  -TW    Moving from lying on back to sitting on the side of a flat bed without bedrails?  3  -JA  --  3  -TW    Moving to and from a bed to a chair (including a wheelchair)?  2  -JA  --  2  -TW    Standing up from a chair using your arms (e.g., wheelchair, bedside chair)?  2  -JA  --  2  -TW    Climbing 3-5 steps with a railing?  2  -JA  --  2  -TW    To walk in hospital room?  2  -JA  --  2  -TW    AM-PAC 6 Clicks Score  14  -JA  --  14  -TW       How much help from another is currently needed...    Putting on and taking off regular lower body clothing?  --  3  -LW  --    Bathing (including washing, rinsing, and drying)  --  3  -LW  --    Toileting (which includes using toilet bed pan or urinal)  --  3  -LW  --    Putting on and taking off regular upper body clothing  --  3  -LW  --    Taking care of personal grooming (such as brushing teeth)  --  3  -LW  --    Eating meals  --  3  -LW  --    Score  --  18  -LW  --       Functional Assessment    Outcome Measure Options  AM-[facility] 6 Clicks Basic Mobility (PT)  -  --  AM-[facility] 6 Clicks Basic Mobility (PT)  -      User Key  (r) = Recorded By, (t) = Taken By, (c) = Cosigned By    Initials Name Provider Type    Candelario Feliciano, KAL Physical Therapy Assistant    Suleman Lopez PTA Physical Therapy Assistant    Darcie Muse, DIPTI/L Occupational Therapy Assistant         Time Calculation:   PT Charges     Row Name 05/05/19 1135             Time Calculation    Start Time  1000  -      Stop Time  1025  -      Time Calculation (min)  25 min  -         Time Calculation- PT    Total Timed Code Minutes- PT  25 minute(s)  -         Timed Charges    40036 - PT Therapeutic Exercise Minutes  10  -JA      29291 - PT Therapeutic Activity Minutes  15  -JA         User Key  (r) = Recorded By, (t) = Taken By, (c) = Cosigned By    Initials Name Provider Type    Candelario Feliciano PTA Physical Therapy Assistant        Therapy Charges for Today     Code Description Service Date Service Provider Modifiers Qty    65430656077 HC PT THER PROC EA 15 MIN 5/5/2019 Candelario Lange, KAL GP 1    01855522907 HC PT THERAPEUTIC ACT EA 15 MIN 5/5/2019 Candelario Lange, KAL GP 1          PT G-Codes  Outcome Measure Options: AM-PAC 6 Clicks Basic Mobility (PT)  AM-PAC 6 Clicks Score: 14  Score: 18    Candelario Lange PTA  5/5/2019

## 2019-05-05 NOTE — PLAN OF CARE
Problem: Patient Care Overview  Goal: Plan of Care Review  Outcome: Ongoing (interventions implemented as appropriate)   05/05/19 1155   Coping/Psychosocial   Plan of Care Reviewed With patient   Coping/Psychosocial   Patient Agreement with Plan of Care agrees   Plan of Care Review   Progress no change   OTHER   Outcome Summary Pt supervision for self feeding task. Pt requires mod A for setting up tray/opening containers. No goals met this tx.

## 2019-05-05 NOTE — PROGRESS NOTES
"5/5/2019    Chief Complaint: suicidal ideation, dementia related behavioral issues and physical aggression    Subjective:  Patient is a 53 y.o. male who was seen on the geriatric side.    He is pleasantly confused today.  Engages well.  Denies any depression or anxiety.  Denies any SI or HI.  No overt paranoia.    Denies any headache or stomachache or nausea.    No behavioral disturbances overnight.    Objective     Vital Signs    Temp:  [96.1 °F (35.6 °C)-97.7 °F (36.5 °C)] 97.7 °F (36.5 °C)  Heart Rate:  [87] 87  Resp:  [18] 18  BP: ()/(68) 124/68    Physical Exam:   General Appearance: alert and appears stated age,  Hygiene:   fair  Gait & Station: Wheelchair  Musculoskeletal: spastic movements of his upper extremity    Mental Status Exam:   Cooperation:  Cooperative  Eye Contact:  Downcast  Psychomotor Behavior:  Appropriate  Mood: \"All right\"  Affect:  Pleasantly confused  Speech:  Normal  Thought Process:  DysCognitive  Associations: Goal Directed  Thought Content:     Mood congurent   Suicidal:  Denies   Homicidal:  None   Hallucinations:  Not demonstrated today   Delusion:  None expressed  Cognitive Functioning:   Fund of Info: Poor   Consciousness: awake and alert  Reliability:  poor  Insight:  Poor  Judgement:  Impaired  Impulse Control:  Impaired and improving    Lab Results (last 24 hours)     ** No results found for the last 24 hours. **        Imaging Results (last 24 hours)     ** No results found for the last 24 hours. **          Medicine:   Current Facility-Administered Medications:   •  acetaminophen (TYLENOL) tablet 650 mg, 650 mg, Oral, Q4H PRN, Jolly Gamez MD  •  aluminum-magnesium hydroxide-simethicone (MAALOX MAX) 400-400-40 MG/5ML suspension 15 mL, 15 mL, Oral, Q6H PRN, Jolly Gamez MD  •  amLODIPine (NORVASC) tablet 10 mg, 10 mg, Oral, Daily, Mery Cronin APRN, 10 mg at 05/05/19 0816  •  aspirin chewable tablet 81 mg, 81 mg, Oral, Daily, Jolly Gamez MD, 81 " mg at 05/05/19 0816  •  atorvastatin (LIPITOR) tablet 10 mg, 10 mg, Oral, Daily, Jolly Gamez MD, 10 mg at 05/05/19 0816  •  CloNIDine (CATAPRES) tablet 0.1 mg, 0.1 mg, Oral, Q4H PRN, Jolly Gamez MD  •  cyclobenzaprine (FLEXERIL) tablet 10 mg, 10 mg, Oral, TID, Jolly Gamez MD, 10 mg at 05/05/19 0815  •  ergocalciferol (DRISDOL) 8000 UNIT/ML drops 2,000 Units, 2,000 Units, Oral, Daily, Jolly Gamez MD, 2,000 Units at 05/05/19 0815  •  folic acid (FOLVITE) tablet 1 mg, 1 mg, Oral, Daily, Jolly Gamez MD, 1 mg at 05/05/19 0815  •  hydrOXYzine pamoate (VISTARIL) capsule 50 mg, 50 mg, Oral, Q6H PRN, Jolly Gamez MD  •  lisinopril (PRINIVIL,ZESTRIL) tablet 10 mg, 10 mg, Oral, Daily, Jolly Gamez MD, 10 mg at 05/05/19 0816  •  loperamide (IMODIUM) capsule 2 mg, 2 mg, Oral, 4x Daily PRN, Jolly Gamez MD  •  magnesium hydroxide (MILK OF MAGNESIA) suspension 2400 mg/10mL 10 mL, 10 mL, Oral, Daily PRN, Jolly Gamez MD  •  melatonin tablet 3 mg, 3 mg, Oral, Nightly, Jolly Gamez MD, 3 mg at 05/04/19 2037  •  nitroglycerin (NITROSTAT) SL tablet 0.4 mg, 0.4 mg, Sublingual, Q5 Min PRN, Jolly Gamez MD  •  pantoprazole (PROTONIX) EC tablet 40 mg, 40 mg, Oral, QAM, Jolly Gamez MD, 40 mg at 05/05/19 0815  •  polyethylene glycol (MIRALAX) powder 17 g, 17 g, Oral, Daily, Jolly Gamez MD, 17 g at 05/05/19 0815  •  risperiDONE (risperDAL) tablet 1 mg, 1 mg, Oral, Q12H, Jolly Gamez MD, 1 mg at 05/05/19 0816  •  [COMPLETED] sertraline (ZOLOFT) tablet 25 mg, 25 mg, Oral, Daily, 25 mg at 04/29/19 1521 **FOLLOWED BY** sertraline (ZOLOFT) tablet 100 mg, 100 mg, Oral, Daily, Jolly Gamez MD, 100 mg at 05/05/19 0816  •  tamsulosin (FLOMAX) 24 hr capsule 0.4 mg, 0.4 mg, Oral, Nightly, Jolly Gamez MD, 0.4 mg at 05/04/19 2037  •  traZODone (DESYREL) tablet 50 mg, 50 mg, Oral, Nightly PRN, Jolly Gamez MD, 50 mg at 05/04/19  2037  •  ziprasidone (GEODON) injection 10 mg, 10 mg, Intramuscular, Daily PRN, Ana Croninnidhi STEVENS, APRN, 10 mg at 05/03/19 2109    Diagnoses/Assessment:     Dementia with behavioral disturbance    Severe episode of recurrent major depressive disorder, with psychotic features (CMS/HCC)    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  2) Will continue to provide treatment with the unit milieu, activities, therapies and psychopharmacological management.  3) Patient to be placed on or continued on  Q15 minute checks  and Suicide and Fall precautions.  4) Pertinent medical issues: No active medical concerns.  5) Will order following labs: none  6) Will make the following medication changes: Will cont the risperdal 1mg qAM & increase to 1.5mg qHS for paranoia & psychosis.  Will increase zoloft to 125mg.  7) Will continue discharge planning as appropriate for patient.  8) Psychotherapy provided for less than 16 minutes.    Treatment plan and medication risks and benefits discussed with: Patient & Tx team    Warner Bragg II, MD  05/05/19  12:55 PM

## 2019-05-05 NOTE — PLAN OF CARE
Problem: Patient Care Overview  Goal: Plan of Care Review  Outcome: Ongoing (interventions implemented as appropriate)   05/04/19 2350   Coping/Psychosocial   Plan of Care Reviewed With patient   Coping/Psychosocial   Patient Agreement with Plan of Care agrees   Plan of Care Review   Progress no change     Goal: Individualization and Mutuality  Outcome: Ongoing (interventions implemented as appropriate)    Goal: Discharge Needs Assessment  Outcome: Ongoing (interventions implemented as appropriate)    Goal: Interprofessional Rounds/Family Conf  Outcome: Ongoing (interventions implemented as appropriate)      Problem: Overarching Goals (Adult)  Goal: Adheres to Safety Considerations for Self and Others  Outcome: Ongoing (interventions implemented as appropriate)   05/04/19 2350   Overarching Goals (Adult)   Adheres to Safety Considerations for Self and Others making progress toward outcome     Goal: Optimized Coping Skills in Response to Life Stressors  Outcome: Ongoing (interventions implemented as appropriate)   05/04/19 2350   Overarching Goals (Adult)   Optimized Coping Skills in Response to Life Stressors making progress toward outcome     Goal: Develops/Participates in Therapeutic [place] to Support Successful Transition  Outcome: Ongoing (interventions implemented as appropriate)   05/04/19 2350   Overarching Goals (Adult)   Develops/Participates in Therapeutic [place] to Support Successful Transition making progress toward outcome       Problem: Skin Injury Risk (Adult)  Goal: Skin Health and Integrity  Outcome: Ongoing (interventions implemented as appropriate)   05/04/19 2350   Skin Injury Risk (Adult)   Skin Health and Integrity making progress toward outcome       Problem: Fall Risk (Adult)  Goal: Absence of Fall  Outcome: Ongoing (interventions implemented as appropriate)   05/04/19 2350   Fall Risk (Adult)   Absence of Fall making progress toward outcome       Problem: Violence, Risk/Actual  (Adult)  Goal: Impulse Control  Outcome: Ongoing (interventions implemented as appropriate)   05/04/19 2350   Violence, Risk/Actual (Adult)   Impulse Control making progress toward outcome       Problem: Cognitive Impairment (Dementia Signs/Symptoms) (Adult)  Goal: Optimized Cognitive Function (Dementia Signs/Symptoms)  Outcome: Ongoing (interventions implemented as appropriate)   05/04/19 2350   Optimized Cognitive Function (Dementia Signs/Symptoms)   Optimized Cognitive Ability Action Step (STG) Outcome making progress toward outcome       Problem: Behavioral Impairment (Dementia Signs/Symptoms) (Adult)  Goal: Improved Behavioral Control (Dementia Signs/Symptoms)  Outcome: Ongoing (interventions implemented as appropriate)   05/04/19 2350   Improved Behavioral Control (Dementia Signs/Symptoms)   Improved Behavioral Control Action Step (STG) Outcome making progress toward outcome

## 2019-05-05 NOTE — THERAPY TREATMENT NOTE
Acute Care - Occupational Therapy Treatment Note  [facility]     Patient Name: Sg Vega  : 1965  MRN: [id number]  Today's Date: 2019  Onset of Illness/Injury or Date of Surgery: 19  Date of Referral to OT: 19  Referring Physician: FERNANDO Gutierrez    Admit Date: 2019       ICD-10-CM ICD-9-CM   1. Oral phase dysphagia R13.11 787.21   2. Impaired functional mobility, balance, and endurance Z74.09 V49.89   3. 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 No pertinent surgical history.    Therapy Treatment    Rehabilitation Treatment Summary     Row Name 19 1000 19 0805          Treatment Time/Intention    Discipline  physical therapy assistant  -JA  occupational therapy assistant  -BB     Document Type  therapy note (daily note)  -DENICE  therapy note (daily note)  -BB     Subjective Information  no complaints  -DENICE  no complaints  -BB     Mode of Treatment  physical therapy;individual therapy  -JA  individual therapy;occupational therapy  -BB     Patient/Family Observations  Pt. sitting up in w/c at nsg. station  -  Pt sitting in W/C at dining table  -BB     Therapy Frequency (PT Clinical Impression)  daily  -  --     Total Minutes, Occupational Therapy Treatment   --  40  -BB     Therapy Frequency (OT Eval)  --  other (see comments) 5-7 days/wk  -BB     Patient Effort  good  -JA  good  -BB     Existing Precautions/Restrictions  fall;other (see comments) behavorial, aggressive, paranoia.  -JA  fall;other (see comments) behavioral aggression, paranoia  -BB     Recorded by [JA] Candelario Lange, PTA 05/05/19 1133 [BB] Netta Robles COTA/L 05/05/19 1153     Row Name 05/05/19 1000             Vital Signs    Pre Systolic BP Rehab  107  -JA      Pre Treatment Diastolic BP  66  -JA      Post Systolic BP Rehab  120  -JA      Post Treatment Diastolic BP  68  -JA      Pretreatment Heart Rate (beats/min)  98  -JA      Posttreatment Heart Rate (beats/min)  95  -JA      Pre SpO2 (%)  98  -JA      O2 Delivery Pre Treatment  room air  -JA      Pre Patient Position  Sitting  -JA      Intra Patient Position  Standing  -JA      Post Patient Position  Sitting  -JA      Recorded by [JA] Candelario Lange, PTA 05/05/19 1133      Row Name 05/05/19 1000 05/05/19 0805          Cognitive Assessment/Intervention- PT/OT    Affect/Mental Status (Cognitive)  flat/blunted affect  -JA  flat/blunted affect  -BB     Orientation Status (Cognition)  oriented to;person  -JA  oriented to;person  -BB     Follows Commands (Cognition)  follows one step commands  -JA  follows one step commands  -BB     Cognitive Function (Cognitive)  safety deficit  -  --     Personal Safety Interventions  --  fall prevention program maintained;gait belt;nonskid shoes/slippers when out of bed;supervised activity  -BB     Recorded by [JA] Candelario Lange, PTA 05/05/19 1133 [BB] Netta Robles COTA/L 05/05/19 1153     Row Name 05/05/19 1000             Sit-Stand Transfer    Sit-Stand [place] (Transfers)  minimum assist (75% patient effort)  -JA      Assistive Device (Sit-Stand Transfers)  walker, front-wheeled Nsg. ok'd use this a.m.   -JA      Recorded by [JA] Candelario Lange, PTA 05/05/19 1133       "Row Name 05/05/19 1000             Stand-Sit Transfer    Stand-Sit [place] (Transfers)  minimum assist (75% patient effort)  -      Assistive Device (Stand-Sit Transfers)  walker, front-wheeled  -      Recorded by [JA] Candelario Lange, PTA 05/05/19 1133      Row Name 05/05/19 1000             Gait/Stairs Assessment/Training    Gait/Stairs Assessment/Training  gait/ambulation independence  -      [place] Level (Gait)  moderate assist (50% patient effort);1 person assist;1 person to manage equipment CNA followed with w/c for safety  -JA      Assistive Device (Gait)  walker, front-wheeled  -      Distance in Feet (Gait)  18'  -JA      Deviations/Abnormal Patterns (Gait)  base of support, narrow;ataxic Pt. with c/o's \" my feet feel numb\"  -JA      Bilateral Gait Deviations  heel strike decreased;weight shift ability decreased B knees slightly flexed with performance and weak  -JA2      Comment (Gait/Stairs)  Pt. stood with RW x1 ~1-2mins then sat in w/c after encouragement pt. agreeable to attempt a gt. attempt this a.m.   -JA2      Recorded by [JA] Candelario Lange, PTA 05/05/19 1133  [JA2] Candelario Lange, [facility] 05/05/19 1137      Row Name 05/05/19 0805             Grooming Assessment/Training    [place] Level (Grooming)  wash face, hands;set up;supervision  -BB      Grooming Position  -- sitting in W/C  -BB      Recorded by [BB] Netta Robles COTA/L 05/05/19 1153      Row Name 05/05/19 0805             Self-Feeding Assessment/Training    [place] Level (Feeding)  liquids to mouth;scoop food and bring to mouth;set up;supervision  -BB      Self-Feeding Assess/Train, Spillage Amount  minimal  -BB      Position (Self-Feeding)  -- sitting in w/c  -BB      Recorded by [BB] Netta Robles COTA/L 05/05/19 1153      Row Name 05/05/19 1000             Therapeutic Exercise    Lower Extremity (Therapeutic Exercise)  LAQ (long arc quad), bilateral;marching while seated  -JA      " Lower Extremity Range of Motion (Therapeutic Exercise)  ankle dorsiflexion/plantar flexion, bilateral  -JA      Position (Therapeutic Exercise)  seated  -JA      Sets/Reps (Therapeutic Exercise)  x15  -JA      Recorded by [JA] Candelario Lange, PTA 05/05/19 1133      Row Name 05/05/19 1000 05/05/19 0805          Positioning and Restraints    Pre-Treatment Position  sitting in chair/recliner  -JA  sitting in chair/recliner  -BB     Post Treatment Position  wheelchair  -JA  wheelchair  -BB     In Wheelchair  sitting;patient within [place] [facility]  -JA  sitting;encouraged to call for assist;with [facility]  -BB     Recorded by [JA] Candelario Lange, PTA 05/05/19 1133 [BB] Netta Robles COTA/L 05/05/19 1153     Row Name 05/05/19 1000 05/05/19 0805          Pain Scale: Numbers Pre/Post-Treatment    Pain Scale: Numbers, Pretreatment  0/10 - no pain  -JA  0/10 - no pain  -BB     Pain Scale: Numbers, Post-Treatment  0/10 - no pain  -JA  0/10 - no pain  -BB     Recorded by [JA] Candelario Lange, PTA 05/05/19 1133 [BB] Netta Robles RESENDEZ/L 05/05/19 1153     Row Name 05/05/19 0805             Plan of Care Review    Plan of Care Reviewed With  patient  -BB      Recorded by [BB] Netta Robles COTA/L 05/05/19 1153      Row Name 05/05/19 0805             Outcome Summary/Treatment Plan (OT)    Daily Summary of Progress (OT)  progress toward functional goals is gradual  -BB      Plan for Continued Treatment (OT)  continue POC  -BB      Anticipated Discharge Disposition (OT)  skilled nursing [facility]  -BB      Recorded by [BB] Netta Robles COTA/L 05/05/19 1153      Row Name 05/05/19 1000             Outcome Summary/Treatment Plan (PT)    Daily Summary of Progress (PT)  progress toward functional goals is gradual  -JA      Plan for Continued Treatment (PT)  Cont. to mobilize as pt. able, if amb. needs to person assist for safety  -JA      Anticipated Discharge Disposition (PT)  skilled nursing  facility  -      Recorded by [JA] Candelario Lange, PTA 05/05/19 1133        User Key  (r) = Recorded By, (t) = Taken By, (c) = Cosigned By    Initials Name Effective Dates Discipline    Candelario Feliciano, PTA 03/07/18 -  PT    BB Travis Netta J, RESENDEZ/L 03/07/18 -  OT           Rehab Goal Summary     Row Name 05/05/19 1000 05/05/19 0805          Physical Therapy Goals    Bed Mobility Goal Selection (PT)  bed mobility, PT goal 1  -JA  --     Transfer Goal Selection (PT)  transfer, PT goal 1  -JA  --     Gait Training Goal Selection (PT)  gait training, PT goal 1  -JA  --        Bed Mobility Goal 1 (PT)    Activity/Assistive Device (Bed Mobility Goal 1, PT)  sit to supine;supine to sit;scooting  -JA  --     [place] Level/Cues Needed (Bed Mobility Goal 1, PT)  standby assist  -JA  --     Time Frame (Bed Mobility Goal 1, PT)  by discharge  -JA  --     Progress/Outcomes (Bed Mobility Goal 1, PT)  goal not met  -JA  --        Transfer Goal 1 (PT)    Activity/Assistive Device (Transfer Goal 1, PT)  bed-to-chair/chair-to-bed  -JA  --     [place] Level/Cues Needed (Transfer Goal 1, PT)  contact guard assist  -JA  --     Time Frame (Transfer Goal 1, PT)  by discharge  -JA  --     Progress/Outcome (Transfer Goal 1, PT)  goal not met  -JA  --        Gait Training Goal 1 (PT)    Activity/Assistive Device (Gait Training Goal 1, PT)  gait (walking locomotion);walker, rolling  -JA  --     [place] Level (Gait Training Goal 1, PT)  contact guard assist  -JA  --     Distance (Gait Goal 1, PT)  15' x 1  -JA  --     Time Frame (Gait Training Goal 1, PT)  by discharge  -JA  --     Barriers (Gait Training Goal 1, PT)  MS, agitation  -JA  --     Progress/Outcome (Gait Training Goal 1, PT)  goal not met  -JA  --        Occupational Therapy Goals    Transfer Goal Selection (OT)  --  transfer, OT goal 1  -BB     Toileting Goal Selection (OT)  --  toileting, OT goal 1  -BB     Self-Feeding Goal Selection (OT)  --   self feeding, OT goal 1  -BB     Strength Goal Selection (OT)  --  strength, OT goal 1  -BB     Coordination Goal Selection (OT)  --  coordination, OT goal 1  -BB        Transfer Goal 1 (OT)    Activity/Assistive Device (Transfer Goal 1, OT)  --  sit-to-stand/stand-to-sit;bed-to-chair/chair-to-bed;toilet;walker, rolling  -BB     [place] Level/Cues Needed (Transfer Goal 1, OT)  --  contact guard assist  -BB     Time Frame (Transfer Goal 1, OT)  --  long term goal (LTG);by discharge  -BB     Progress/Outcome (Transfer Goal 1, OT)  --  goal not met  -BB        Toileting Goal 1 (OT)    Activity/Device (Toileting Goal 1, OT)  --  toileting skills, all  -BB     [place] Level/Cues Needed (Toileting Goal 1, OT)  --  contact guard assist  -BB     Time Frame (Toileting Goal 1, OT)  --  long term goal (LTG);by discharge  -BB     Progress/Outcome (Toileting Goal 1, OT)  --  goal not met  -BB        Self-Feeding Goal 1 (OT)    Activity/Assistive Device (Self-Feeding Goal 1, OT)  --  self-feeding skills, all  -BB     [place] Level/Cues Needed (Self-Feeding Goal 1, OT)  --  set-up required;supervision required  -BB     Time Frame (Self-Feeding Goal 1, OT)  --  long term goal (LTG);by discharge  -BB     Progress/Outcomes (Self-Feeding Goal 1, OT)  --  goal not met  -BB        Strength Goal 1 (OT)    Strength Goal 1 (OT)  --  Pt will complete BUE AROM exercises in all planes to increase strength by 1/2 grade to increase functional independence with ADLs.   -BB     Time Frame (Strength Goal 1, OT)  --  long term goal (LTG);by discharge  -BB     Progress/Outcome (Strength Goal 1, OT)  --  goal not met  -BB        Coordination Goal 1 (OT)    Activity/Assistive Device (Coordination Goal 1, OT)  --  GM task;FM task  -BB     [place] Level/Cues Needed (Coordination Goal 1, OT)  --  minimum assist (75% or more patient effort);verbal cues required  -BB     Time Frame (Coordination Goal 1, OT)  --  long term goal (LTG);by  discharge  -BB     Progress/Outcomes (Coordination Goal 1, OT)  --  goal not met  -BB       User Key  (r) = Recorded By, (t) = Taken By, (c) = Cosigned By    Initials Name Provider Type Discipline    Candelario Feliciano, KAL Physical Therapy Assistant PT    Netta Chilel, DIPTI/L Occupational Therapy Assistant OT        Occupational Therapy Education     Title: PT OT SLP Therapies (In Progress)     Topic: Occupational Therapy (In Progress)     Point: ADL training (In Progress)     Description: Instruct learner(s) on proper safety adaptation and remediation techniques during self care or transfers.   Instruct in proper use of assistive devices.    Learning Progress Summary           Patient Acceptance, E, NR by DEE at 5/5/2019 11:54 AM    Acceptance, E,TB, VU by LW at 5/4/2019  1:36 PM    Acceptance, E, VU by BB at 4/29/2019  2:52 PM    Acceptance, E,TB, VU by MR at 4/28/2019  4:30 PM    Comment:  Role of OT and POC. Benefit of activity                   Point: Home exercise program (Done)     Description: Instruct learner(s) on appropriate technique for monitoring, assisting and/or progressing therapeutic exercises/activities.    Learning Progress Summary           Patient Acceptance, E,TB, VU by LW at 5/4/2019  1:36 PM                   Point: Precautions (Done)     Description: Instruct learner(s) on prescribed precautions during self-care and functional transfers.    Learning Progress Summary           Patient Acceptance, E,TB, VU by LW at 5/4/2019  1:36 PM    Acceptance, E,TB, VU by MR at 4/28/2019  4:30 PM    Comment:  Role of OT and POC. Benefit of activity                   Point: Body mechanics (Done)     Description: Instruct learner(s) on proper positioning and spine alignment during self-care, functional mobility activities and/or exercises.    Learning Progress Summary           Patient Acceptance, E,TB, VU by LW at 5/4/2019  1:36 PM    Acceptance, E,TB, VU by MR at 4/28/2019  4:30 PM     Comment:  Role of OT and POC. Benefit of activity                               User Key     Initials Effective Dates Name Provider Type Discipline    BB 03/07/18 -  Netta Robles COTA/L Occupational Therapy Assistant OT    LW 03/07/18 -  Darcie Guaman COTA/L Occupational Therapy Assistant OT    MR 04/03/18 -  Romina Marshall OT Occupational Therapist OT                OT Recommendation and Plan  Outcome Summary/Treatment Plan (OT)  Daily Summary of Progress (OT): progress toward functional goals is gradual  Plan for Continued Treatment (OT): continue POC  Anticipated Discharge Disposition (OT): skilled nursing facility  Therapy Frequency (OT Eval): other (see comments)(5-7 days/wk)  Daily Summary of Progress (OT): progress toward functional goals is gradual  Plan of Care Review  Plan of Care Reviewed With: patient  Plan of Care Reviewed With: patient  Outcome Summary: Pt supervision for self feeding task. Pt requires mod A for setting up tray/opening containers. No goals met this tx.  Outcome Measures     Row Name 05/05/19 1000 05/05/19 0805 05/04/19 1015       How much help from another person do you currently need...    Turning from your back to your side while in flat bed without using bedrails?  3  -JA  --  --    Moving from lying on back to sitting on the side of a flat bed without bedrails?  3  -JA  --  --    Moving to and from a bed to a chair (including a wheelchair)?  2  -JA  --  --    Standing up from a chair using your arms (e.g., wheelchair, bedside chair)?  2  -JA  --  --    Climbing 3-5 steps with a railing?  2  -JA  --  --    To walk in hospital room?  2  -JA  --  --    AM-PAC 6 Clicks Score  14  -JA  --  --       How much help from another is currently needed...    Putting on and taking off regular lower body clothing?  --  3  -BB  3  -LW    Bathing (including washing, rinsing, and drying)  --  3  -BB  3  -LW    Toileting (which includes using toilet bed pan or urinal)  --  3  -BB  3   -LW    Putting on and taking off regular upper body clothing  --  3  -BB  3  -LW    Taking care of personal grooming (such as brushing teeth)  --  3  -BB  3  -LW    Eating meals  --  3  -BB  3  -LW    Score  --  18  -BB  18  -LW       Functional Assessment    Outcome Measure Options  AM-PAC 6 Clicks Basic Mobility (PT)  -DENICE  --  --    Row Name 05/03/19 1540             How much help from another person do you currently need...    Turning from your back to your side while in flat bed without using bedrails?  3  -TW      Moving from lying on back to sitting on the side of a flat bed without bedrails?  3  -TW      Moving to and from a bed to a chair (including a wheelchair)?  2  -TW      Standing up from a chair using your arms (e.g., wheelchair, bedside chair)?  2  -TW      Climbing 3-5 steps with a railing?  2  -TW      To walk in hospital room?  2  -TW      AM-PAC 6 Clicks Score  14  -TW         Functional Assessment    Outcome Measure Options  AM-PAC 6 Clicks Basic Mobility (PT)  -TW        User Key  (r) = Recorded By, (t) = Taken By, (c) = Cosigned By    Initials Name Provider Type    Candelario Feliciano, PTA Physical Therapy Assistant    Suleman Lopez, KAL Physical Therapy Assistant    Netta Chilel, DIPTI/L Occupational Therapy Assistant    Darcie Muse, DIPTI/L Occupational Therapy Assistant           Time Calculation:   Time Calculation- OT     Row Name 05/05/19 1156             Time Calculation- OT    OT Start Time  0805  -BB      OT Stop Time  0845  -      OT Time Calculation (min)  40 min  -      Total Timed Code Minutes- OT  40 minute(s)  -      OT Received On  05/05/19  -        User Key  (r) = Recorded By, (t) = Taken By, (c) = Cosigned By    Initials Name Provider Type    Netta Chilel, DIPTI/L Occupational Therapy Assistant        Therapy Charges for Today     Code Description Service Date Service Provider Modifiers Qty    [id number]  OT SELF CARE/MGMT/TRAIN  EA 15 MIN 5/5/2019 Netta Robles COTA/L GO 3               DANIEL Morales  5/5/2019

## 2019-05-05 NOTE — PLAN OF CARE
Problem: Patient Care Overview  Goal: Plan of Care Review  Outcome: Ongoing (interventions implemented as appropriate)   05/04/19 2209   Coping/Psychosocial   Plan of Care Reviewed With patient   Coping/Psychosocial   Patient Agreement with Plan of Care agrees   Plan of Care Review   Progress no change     Goal: Individualization and Mutuality  Outcome: Ongoing (interventions implemented as appropriate)    Goal: Discharge Needs Assessment  Outcome: Ongoing (interventions implemented as appropriate)    Goal: Interprofessional Rounds/Family Conf  Outcome: Ongoing (interventions implemented as appropriate)      Problem: Overarching Goals (Adult)  Goal: Adheres to Safety Considerations for Self and Others  Outcome: Ongoing (interventions implemented as appropriate)   05/04/19 2209   Overarching Goals (Adult)   Adheres to Safety Considerations for Self and Others making progress toward outcome     Goal: Optimized Coping Skills in Response to Life Stressors  Outcome: Ongoing (interventions implemented as appropriate)    Goal: Develops/Participates in Therapeutic [place] to Support Successful Transition  Outcome: Ongoing (interventions implemented as appropriate)   05/04/19 2209   Overarching Goals (Adult)   Develops/Participates in Therapeutic [place] to Support Successful Transition making progress toward outcome       Problem: Skin Injury Risk (Adult)  Goal: Skin Health and Integrity  Outcome: Ongoing (interventions implemented as appropriate)   05/04/19 2209   Skin Injury Risk (Adult)   Skin Health and Integrity making progress toward outcome       Problem: Fall Risk (Adult)  Goal: Absence of Fall  Outcome: Ongoing (interventions implemented as appropriate)   05/04/19 2209   Fall Risk (Adult)   Absence of Fall making progress toward outcome       Problem: Violence, Risk/Actual (Adult)  Goal: Impulse Control  Outcome: Ongoing (interventions implemented as appropriate)   05/04/19 2209   Violence, Risk/Actual (Adult)    Impulse Control making progress toward outcome       Problem: Cognitive Impairment (Dementia Signs/Symptoms) (Adult)  Goal: Optimized Cognitive Function (Dementia Signs/Symptoms)  Outcome: Ongoing (interventions implemented as appropriate)   05/04/19 2209   Optimized Cognitive Function (Dementia Signs/Symptoms)   Optimized Cognitive Ability Action Step (STG) Outcome making progress toward outcome       Problem: Behavioral Impairment (Dementia Signs/Symptoms) (Adult)  Goal: Improved Behavioral Control (Dementia Signs/Symptoms)  Outcome: Ongoing (interventions implemented as appropriate)   05/04/19 2209   Improved Behavioral Control (Dementia Signs/Symptoms)   Improved Behavioral Control Action Step (STG) Outcome making progress toward outcome
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
## 2019-05-05 NOTE — PROGRESS NOTES
"5/4/2019    Chief Complaint: suicidal ideation, dementia related behavioral issues and physical aggression    Subjective:  Patient is a 53 y.o. male who was hospitalized for suicidal ideation, dementia related behavioral issues and physical aggression.       Pleasantly confused.    Denies depression, anxiety.  Denies SI/HI.    Behavioral disturbanc overnight necessitating Geodon.        Objective     Vital Signs    Temp:  [97.5 °F (36.4 °C)] 97.5 °F (36.4 °C)  Heart Rate:  [80] 80  Resp:  [18] 18  BP: ()/(44-59) 102/59    Physical Exam:   General Appearance: alert and appears stated age,  Hygiene:   fair  Gait & Station: Wheelchair  Musculoskeletal: spastic movements of his upper extremity    Mental Status Exam:   Cooperation:  Cooperative  Eye Contact:  Downcast  Psychomotor Behavior:  Appropriate  Mood: \"OK\"  Affect:  Pleasantly confused  Speech:  Normal  Thought Process:  DysCognitive  Associations: Goal Directed  Thought Content:     Mood congurent   Suicidal:  Denies   Homicidal:  None   Hallucinations:  Not demonstrated today   Delusion:  None expressed  Cognitive Functioning:   Fund of Info: Poor   Consciousness: awake and alert  Reliability:  poor  Insight:  Poor  Judgement:  Impaired  Impulse Control:  Impaired    Lab Results (last 24 hours)     ** No results found for the last 24 hours. **        Imaging Results (last 24 hours)     ** No results found for the last 24 hours. **          Medicine:   Current Facility-Administered Medications:   •  acetaminophen (TYLENOL) tablet 650 mg, 650 mg, Oral, Q4H PRN, Jolly Gamez MD  •  aluminum-magnesium hydroxide-simethicone (MAALOX MAX) 400-400-40 MG/5ML suspension 15 mL, 15 mL, Oral, Q6H PRN, Jolly Gamez MD  •  amLODIPine (NORVASC) tablet 10 mg, 10 mg, Oral, Daily, Mery Cronin APRN, 10 mg at 05/03/19 0821  •  aspirin chewable tablet 81 mg, 81 mg, Oral, Daily, Jolly Gamez MD, 81 mg at 05/04/19 0834  •  atorvastatin (LIPITOR) " tablet 10 mg, 10 mg, Oral, Daily, Jolly Gamez MD, 10 mg at 05/04/19 0830  •  CloNIDine (CATAPRES) tablet 0.1 mg, 0.1 mg, Oral, Q4H PRN, Jolly Gamez MD  •  cyclobenzaprine (FLEXERIL) tablet 10 mg, 10 mg, Oral, TID, Jolly Gamez MD, 10 mg at 05/04/19 2037  •  ergocalciferol (DRISDOL) 8000 UNIT/ML drops 2,000 Units, 2,000 Units, Oral, Daily, Jolly Gamez MD, 2,000 Units at 05/04/19 0831  •  folic acid (FOLVITE) tablet 1 mg, 1 mg, Oral, Daily, Jolly Gamez MD, 1 mg at 05/04/19 0830  •  hydrOXYzine pamoate (VISTARIL) capsule 50 mg, 50 mg, Oral, Q6H PRN, Jolly Gamez MD  •  lisinopril (PRINIVIL,ZESTRIL) tablet 10 mg, 10 mg, Oral, Daily, Jolly Gamez MD, 10 mg at 05/03/19 0821  •  loperamide (IMODIUM) capsule 2 mg, 2 mg, Oral, 4x Daily PRN, Jolly Gamez MD  •  magnesium hydroxide (MILK OF MAGNESIA) suspension 2400 mg/10mL 10 mL, 10 mL, Oral, Daily PRN, Jolly Gamez MD  •  melatonin tablet 3 mg, 3 mg, Oral, Nightly, Jolly Gamez MD, 3 mg at 05/04/19 2037  •  nitroglycerin (NITROSTAT) SL tablet 0.4 mg, 0.4 mg, Sublingual, Q5 Min PRN, Jolly Gamez MD  •  pantoprazole (PROTONIX) EC tablet 40 mg, 40 mg, Oral, QAM, Jolly Gamez MD, 40 mg at 05/04/19 0830  •  polyethylene glycol (MIRALAX) powder 17 g, 17 g, Oral, Daily, Jolly Gamez MD, 17 g at 05/04/19 0831  •  risperiDONE (risperDAL) tablet 1 mg, 1 mg, Oral, Q12H, Jolly Gamez MD, 1 mg at 05/04/19 2037  •  [COMPLETED] sertraline (ZOLOFT) tablet 25 mg, 25 mg, Oral, Daily, 25 mg at 04/29/19 1521 **FOLLOWED BY** sertraline (ZOLOFT) tablet 100 mg, 100 mg, Oral, Daily, Jolly Gamez MD, 100 mg at 05/04/19 0830  •  tamsulosin (FLOMAX) 24 hr capsule 0.4 mg, 0.4 mg, Oral, Nightly, Jolly Gamez MD, 0.4 mg at 05/04/19 2037  •  traZODone (DESYREL) tablet 50 mg, 50 mg, Oral, Nightly PRN, Jolly Gamez MD, 50 mg at 05/04/19 2037  •  ziprasidone (GEODON) injection 10 mg, 10  mg, Intramuscular, Daily PRN, Mery Cronin, APRN, 10 mg at 05/03/19 7653    Diagnoses/Assessment:     Dementia with behavioral disturbance    Severe episode of recurrent major depressive disorder, with psychotic features (CMS/HCC)    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  2) Will continue to provide treatment with the unit milieu, activities, therapies and psychopharmacological management.  3) Patient to be placed on or continued on  Q15 minute checks  and Suicide precautions.  4) Pertinent medical issues: No active medical concerns.  5) Will order following labs: none  6) Will make the following medication changes: Will cont the risperdal 1mg bid.  Will increase zoloft to 100mg.  7) Will continue discharge planning as appropriate for patient.  8) Psychotherapy provided for less than 16 minutes.    Treatment plan and medication risks and benefits discussed with: Patient & Tx team    Warner Bragg II, MD  05/04/19  10:01 PM

## 2019-05-05 NOTE — PLAN OF CARE
Problem: Patient Care Overview  Goal: Plan of Care Review  Outcome: Ongoing (interventions implemented as appropriate)    Goal: Individualization and Mutuality  Outcome: Ongoing (interventions implemented as appropriate)      Problem: Overarching Goals (Adult)  Goal: Adheres to Safety Considerations for Self and Others  Outcome: Ongoing (interventions implemented as appropriate)    Goal: Optimized Coping Skills in Response to Life Stressors  Outcome: Ongoing (interventions implemented as appropriate)    Goal: Develops/Participates in Therapeutic [place] to Support Successful Transition  Outcome: Ongoing (interventions implemented as appropriate)      Problem: Skin Injury Risk (Adult)  Goal: Skin Health and Integrity  Outcome: Ongoing (interventions implemented as appropriate)      Problem: Fall Risk (Adult)  Goal: Absence of Fall  Outcome: Ongoing (interventions implemented as appropriate)      Problem: Violence, Risk/Actual (Adult)  Goal: Impulse Control  Outcome: Ongoing (interventions implemented as appropriate)      Problem: Cognitive Impairment (Dementia Signs/Symptoms) (Adult)  Goal: Optimized Cognitive Function (Dementia Signs/Symptoms)  Outcome: Ongoing (interventions implemented as appropriate)      Problem: Behavioral Impairment (Dementia Signs/Symptoms) (Adult)  Goal: Improved Behavioral Control (Dementia Signs/Symptoms)  Outcome: Ongoing (interventions implemented as appropriate)

## 2019-05-05 NOTE — PLAN OF CARE
Problem: Patient Care Overview  Goal: Plan of Care Review  Outcome: Ongoing (interventions implemented as appropriate)   05/05/19 1000   Coping/Psychosocial   Plan of Care Reviewed With patient   Coping/Psychosocial   Patient Agreement with Plan of Care agrees   Plan of Care Review   Progress no change   OTHER   Outcome Summary Pt. able to perform x15 reps seated therex, sit-stand-sit Mary & amb. with RW ModAx1 + 1 to follow with w/c for safety due to pt. very unsteady with gt. and ataxic with performance. Cont. to mobilize as pt. able      Goal: Discharge Needs Assessment  Outcome: Ongoing (interventions implemented as appropriate)   04/26/19 0058 04/26/19 0800 04/26/19 0954   Discharge Needs Assessment   Readmission Within the Last 30 Days --  --  no previous admission in last 30 days   Concerns to be Addressed --  --  cognitive/perceptual;decision making;discharge planning;coping/stress;mental health   Patient/Family Anticipates Transition to --  long term care facility --    Patient/Family Anticipated Services at Transition --  skilled nursing --    Transportation Anticipated family or friend will provide --  --    Discharge Needs Assessment,    Outpatient/Agency/Support Group Needs --  (SNF) --    Anticipated Discharge Disposition --  skilled nursing facility (SNF) --

## 2019-05-05 NOTE — NURSING NOTE
Behavior   Note any precipitants to event or behavior   Describe level and action of any aggressive behavior or speech and associated interventions.     Anxiety: Restless/Edgy  Depression: depressed mood  Pain  0  AVH   no  S/I   no  Plan  no  H/I   no  Plan  no    Affect   labile      Note:pt sitting in chair at breakfast table with peers eating, willing to answer questions with yes and no answers, denies anxiety or depression, SI or HI affect flat, poor eye contact, willing to take medications as ordered.      Intervention    PRN medication utilized:  no    Instructed in medication usage and effects  Medications administered as ordered  Encouraged to verbalize needs      Response    Verbalized understanding   Did patient take medications as ordered yes   Did patient interact with assessment?  yes     Plan    Will monitor for safety  Will monitor every 15 minutes as ordered  Will evaluate and promote the plan of care    Last BM:  05/03/2019  (Please chart in I/O as well)

## 2019-05-05 NOTE — NURSING NOTE
"Behavior   Note any precipitants to event or behavior   Describe level and action of any aggressive behavior or speech and associated interventions.     Anxiety: Patient denies at this time  Depression: Patient denies at this time  Pain  0  AVH   no  S/I   no  Plan  no  H/I   no  Plan  no    Affect   mood-congruent      Note: Patient sitting in common area in wheelchair interacting with peers and staff. He is calm and cooperative. He does state that he wishes he brought his bicycle here with him and states \"that would be some good exercise.\" Patient stated he felt he didn't sleep well last night. Offered PRN trazodone. Patient took all medications as ordered. Encouraged open communication with staff. No S/S of distress. Will continue to monitor.       Intervention    PRN medication utilized:  yes - Trazodone for sleep    Instructed in medication usage and effects  Medications administered as ordered  Encouraged to verbalize needs      Response    Verbalized understanding   Did patient take medications as ordered yes   Did patient interact with assessment?  yes     Plan    Will monitor for safety  Will monitor every 15 minutes as ordered  Will evaluate and promote the plan of care    Last BM:  unknown date  (Please chart in I/O as well)    "

## 2019-05-06 PROCEDURE — 97530 THERAPEUTIC ACTIVITIES: CPT

## 2019-05-06 PROCEDURE — 99232 SBSQ HOSP IP/OBS MODERATE 35: CPT | Performed by: PSYCHIATRY & NEUROLOGY

## 2019-05-06 PROCEDURE — 97110 THERAPEUTIC EXERCISES: CPT

## 2019-05-06 RX ORDER — ATORVASTATIN CALCIUM 10 MG/1
10 TABLET, FILM COATED ORAL NIGHTLY
Status: DISCONTINUED | OUTPATIENT
Start: 2019-05-06 | End: 2019-05-07 | Stop reason: HOSPADM

## 2019-05-06 RX ADMIN — CYCLOBENZAPRINE HYDROCHLORIDE 10 MG: 10 TABLET, FILM COATED ORAL at 20:54

## 2019-05-06 RX ADMIN — POLYETHYLENE GLYCOL 3350 17 G: 17 POWDER, FOR SOLUTION ORAL at 09:04

## 2019-05-06 RX ADMIN — RISPERIDONE 1 MG: 0.5 TABLET ORAL at 09:05

## 2019-05-06 RX ADMIN — RISPERIDONE 1.5 MG: 0.5 TABLET ORAL at 09:06

## 2019-05-06 RX ADMIN — TAMSULOSIN HYDROCHLORIDE 0.4 MG: 0.4 CAPSULE ORAL at 20:54

## 2019-05-06 RX ADMIN — SERTRALINE HYDROCHLORIDE 125 MG: 50 TABLET ORAL at 09:05

## 2019-05-06 RX ADMIN — ASPIRIN 81 MG CHEWABLE TABLET 81 MG: 81 TABLET CHEWABLE at 09:04

## 2019-05-06 RX ADMIN — CYCLOBENZAPRINE HYDROCHLORIDE 10 MG: 10 TABLET, FILM COATED ORAL at 16:19

## 2019-05-06 RX ADMIN — MELATONIN 3 MG: at 20:54

## 2019-05-06 RX ADMIN — TRAZODONE HYDROCHLORIDE 50 MG: 50 TABLET ORAL at 20:54

## 2019-05-06 RX ADMIN — PANTOPRAZOLE SODIUM 40 MG: 40 TABLET, DELAYED RELEASE ORAL at 09:05

## 2019-05-06 RX ADMIN — ERGOCALCIFEROL SOLN 200 MCG/ML (8000 UNIT/ML) 2000 UNITS: 8000 SOLUTION at 09:05

## 2019-05-06 RX ADMIN — LISINOPRIL 10 MG: 10 TABLET ORAL at 09:05

## 2019-05-06 RX ADMIN — FOLIC ACID 1 MG: 1 TABLET ORAL at 09:05

## 2019-05-06 RX ADMIN — ATORVASTATIN CALCIUM 10 MG: 10 TABLET, FILM COATED ORAL at 20:54

## 2019-05-06 RX ADMIN — CYCLOBENZAPRINE HYDROCHLORIDE 10 MG: 10 TABLET, FILM COATED ORAL at 09:05

## 2019-05-06 RX ADMIN — AMLODIPINE BESYLATE 10 MG: 10 TABLET ORAL at 09:04

## 2019-05-06 NOTE — NURSING NOTE
Behavior   Note any precipitants to event or behavior   Describe level and action of any aggressive behavior or speech and associated interventions.     Anxiety: Restless/Edgy  Depression: depressed mood  Pain  0  AVH   no  S/I   no  Plan  no  H/I   no  Plan  no    Affect   flat      Note:Patient resting in bed when signee entered room. Patient is compliant with medications. Patient denies S/I, AVH and H/I.      Intervention    PRN medication utilized:  yes - Trazodone    Instructed in medication usage and effects  Medications administered as ordered  Encouraged to verbalize needs      Response    Verbalized understanding   Did patient take medications as ordered yes   Did patient interact with assessment?  yes     Plan    Will monitor for safety  Will monitor every 15 minutes as ordered  Will evaluate and promote the plan of care    Last BM:  unknown date  (Please chart in I/O as well)

## 2019-05-06 NOTE — PLAN OF CARE
Problem: Patient Care Overview  Goal: Plan of Care Review  Outcome: Ongoing (interventions implemented as appropriate)    Goal: Individualization and Mutuality  Outcome: Ongoing (interventions implemented as appropriate)    Goal: Discharge Needs Assessment  Outcome: Ongoing (interventions implemented as appropriate)    Goal: Interprofessional Rounds/Family Conf  Outcome: Ongoing (interventions implemented as appropriate)      Problem: Overarching Goals (Adult)  Goal: Adheres to Safety Considerations for Self and Others  Outcome: Ongoing (interventions implemented as appropriate)    Goal: Optimized Coping Skills in Response to Life Stressors  Outcome: Ongoing (interventions implemented as appropriate)    Goal: Develops/Participates in Therapeutic [place] to Support Successful Transition  Outcome: Ongoing (interventions implemented as appropriate)      Problem: Skin Injury Risk (Adult)  Goal: Skin Health and Integrity  Outcome: Ongoing (interventions implemented as appropriate)      Problem: Fall Risk (Adult)  Goal: Absence of Fall  Outcome: Ongoing (interventions implemented as appropriate)      Problem: Violence, Risk/Actual (Adult)  Goal: Impulse Control  Outcome: Ongoing (interventions implemented as appropriate)      Problem: Cognitive Impairment (Dementia Signs/Symptoms) (Adult)  Goal: Optimized Cognitive Function (Dementia Signs/Symptoms)  Outcome: Ongoing (interventions implemented as appropriate)      Problem: Behavioral Impairment (Dementia Signs/Symptoms) (Adult)  Goal: Improved Behavioral Control (Dementia Signs/Symptoms)  Outcome: Ongoing (interventions implemented as appropriate)

## 2019-05-06 NOTE — THERAPY TREATMENT NOTE
Acute Care - Occupational Therapy Treatment Note  [facility]     Patient Name: Sg Vega  : 1965  MRN: [id number]  Today's Date: 2019  Onset of Illness/Injury or Date of Surgery: 19  Date of Referral to OT: 19  Referring Physician: FERNANDO Gutierrez    Admit Date: 2019       ICD-10-CM ICD-9-CM   1. Oral phase dysphagia R13.11 787.21   2. Impaired functional mobility, balance, and endurance Z74.09 V49.89   3. 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 No pertinent surgical history.    Therapy Treatment    Rehabilitation Treatment Summary     Row Name 19 1125             Treatment Time/Intention    Discipline  occupational therapy assistant  -BB      Document Type  therapy note (daily note)  -BB      Subjective Information  no complaints  -BB      Mode of Treatment  individual therapy;occupational therapy  -BB      Patient/Family Observations  Pt in W/C sitting at table  -BB      Total Minutes, Occupational Therapy Treatment  25  -BB      Therapy Frequency (OT Eval)  other (see comments) 5-7 days/wk  -BB      Patient Effort  adequate  -BB      Existing Precautions/Restrictions  fall;other (see comments) behavioral aggression, paranoria  -BB      Recorded by [BB]  Netta Robles COTA/L 05/06/19 1400      Row Name 05/06/19 1125             Cognitive Assessment/Intervention- PT/OT    Affect/Mental Status (Cognitive)  flat/blunted affect  -BB      Orientation Status (Cognition)  oriented to;person  -BB      Follows Commands (Cognition)  follows one step commands  -BB      Personal Safety Interventions  fall prevention program maintained;gait belt;muscle strengthening facilitated;nonskid shoes/slippers when out of bed  -BB      Recorded by [BB] Netta Robles COTA/L 05/06/19 1400      Row Name 05/06/19 1125             Therapeutic Exercise    Upper Extremity Range of Motion (Therapeutic Exercise)  shoulder flexion/extension, bilateral;shoulder internal/external rotation, bilateral;elbow flexion/extension, bilateral;forearm supination/pronation, bilateral;wrist flexion/extension, bilateral L shoulder ROM limited  -BB      Hand (Therapeutic Exercise)  finger flexion/extension, bilateral  -BB      Exercise Type (Therapeutic Exercise)  AROM (active range of motion)  -BB      Position (Therapeutic Exercise)  seated  -BB      Sets/Reps (Therapeutic Exercise)  1x15  -BB      Expected Outcome (Therapeutic Exercise)  improve functional stability;improve functional tolerance, self-care activity;improve performance, transfer skills  -BB      Recorded by [BB] Netta Robles COTA/L 05/06/19 1400      Row Name 05/06/19 1125             Positioning and Restraints    Pre-Treatment Position  sitting in chair/recliner  -BB      Post Treatment Position  wheelchair  -BB      In Bed  sitting;call light within reach;encouraged to call for assist;exit alarm on  -BB      Recorded by [BB] Netta Robles COTA/L 05/06/19 1400      Row Name 05/06/19 1125             Pain Scale: Numbers Pre/Post-Treatment    Pain Scale: Numbers, Pretreatment  0/10 - no pain  -BB      Pain Scale: Numbers, Post-Treatment  0/10 - no pain  -BB      Recorded by [BB] Netta Robles COTA/KASEY 05/06/19  1400      Row Name 05/06/19 1125             Outcome Summary/Treatment Plan (OT)    Daily Summary of Progress (OT)  progress toward functional goals is gradual  -BB      Plan for Continued Treatment (OT)  continue POC  -BB      Anticipated Discharge Disposition (OT)  skilled nursing facility  -BB      Recorded by [DEE] Netta Robles COTA/L 05/06/19 1400        User Key  (r) = Recorded By, (t) = Taken By, (c) = Cosigned By    Initials Name Effective Dates Discipline    BB Netta Robles COTA/L 03/07/18 -  OT           Rehab Goal Summary     Row Name 05/06/19 1125             Occupational Therapy Goals    Transfer Goal Selection (OT)  transfer, OT goal 1  -BB      Toileting Goal Selection (OT)  toileting, OT goal 1  -BB      Self-Feeding Goal Selection (OT)  self feeding, OT goal 1  -BB      Strength Goal Selection (OT)  strength, OT goal 1  -BB      Coordination Goal Selection (OT)  coordination, OT goal 1  -BB         Transfer Goal 1 (OT)    Activity/Assistive Device (Transfer Goal 1, OT)  sit-to-stand/stand-to-sit;bed-to-chair/chair-to-bed;toilet;walker, rolling  -BB      [place] Level/Cues Needed (Transfer Goal 1, OT)  contact guard assist  -BB      Time Frame (Transfer Goal 1, OT)  long term goal (LTG);by discharge  -BB      Progress/Outcome (Transfer Goal 1, OT)  goal not met  -BB         Toileting Goal 1 (OT)    Activity/Device (Toileting Goal 1, OT)  toileting skills, all  -BB      [place] Level/Cues Needed (Toileting Goal 1, OT)  contact guard assist  -BB      Time Frame (Toileting Goal 1, OT)  long term goal (LTG);by discharge  -BB      Progress/Outcome (Toileting Goal 1, OT)  goal not met  -BB         Self-Feeding Goal 1 (OT)    Activity/Assistive Device (Self-Feeding Goal 1, OT)  self-feeding skills, all  -BB      [place] Level/Cues Needed (Self-Feeding Goal 1, OT)  set-up required;supervision required  -BB      Time Frame (Self-Feeding Goal 1, OT)  long term goal (LTG);by  discharge  -BB      Progress/Outcomes (Self-Feeding Goal 1, OT)  goal not met  -BB         Strength Goal 1 (OT)    Strength Goal 1 (OT)  Pt will complete BUE AROM exercises in all planes to increase strength by 1/2 grade to increase functional independence with ADLs.   -BB      Time Frame (Strength Goal 1, OT)  long term goal (LTG);by discharge  -BB      Progress/Outcome (Strength Goal 1, OT)  goal not met  -BB         Coordination Goal 1 (OT)    Activity/Assistive Device (Coordination Goal 1, OT)  GM task;FM task  -BB      [place] Level/Cues Needed (Coordination Goal 1, OT)  minimum assist (75% or more patient effort);verbal cues required  -BB      Time Frame (Coordination Goal 1, OT)  long term goal (LTG);by discharge  -BB      Progress/Outcomes (Coordination Goal 1, OT)  goal not met  -BB        User Key  (r) = Recorded By, (t) = Taken By, (c) = Cosigned By    Initials Name Provider Type Discipline    Netta Chilel COTA/L Occupational Therapy Assistant OT        Occupational Therapy Education     Title: PT OT SLP Therapies (In Progress)     Topic: Occupational Therapy (In Progress)     Point: ADL training (In Progress)     Description: Instruct learner(s) on proper safety adaptation and remediation techniques during self care or transfers.   Instruct in proper use of assistive devices.    Learning Progress Summary           Patient Acceptance, E, NR by DEE at 5/5/2019 11:54 AM    Acceptance, E,TB, VU by CLINT at 5/4/2019  1:36 PM    Acceptance, E, VU by DEE at 4/29/2019  2:52 PM    Acceptance, E,TB, VU by  at 4/28/2019  4:30 PM    Comment:  Role of OT and POC. Benefit of activity                   Point: Home exercise program (Done)     Description: Instruct learner(s) on appropriate technique for monitoring, assisting and/or progressing therapeutic exercises/activities.    Learning Progress Summary           Patient Acceptance, E,TB, VU by CLINT at 5/4/2019  1:36 PM                   Point: Precautions  (Done)     Description: Instruct learner(s) on prescribed precautions during self-care and functional transfers.    Learning Progress Summary           Patient Acceptance, E,TB, VU by LW at 5/4/2019  1:36 PM    Acceptance, E,TB, VU by MR at 4/28/2019  4:30 PM    Comment:  Role of OT and POC. Benefit of activity                   Point: Body mechanics (Done)     Description: Instruct learner(s) on proper positioning and spine alignment during self-care, functional mobility activities and/or exercises.    Learning Progress Summary           Patient Acceptance, E,TB, VU by LW at 5/4/2019  1:36 PM    Acceptance, E,TB, VU by MR at 4/28/2019  4:30 PM    Comment:  Role of OT and POC. Benefit of activity                               User Key     Initials Effective Dates Name Provider Type Discipline     03/07/18 -  Netta Robles COTA/L Occupational Therapy Assistant OT    LW 03/07/18 -  Darcie Guaman COTA/L Occupational Therapy Assistant OT    MR 04/03/18 -  Romina Marshall, OT Occupational Therapist OT                OT Recommendation and Plan  Outcome Summary/Treatment Plan (OT)  Daily Summary of Progress (OT): progress toward functional goals is gradual  Plan for Continued Treatment (OT): continue POC  Anticipated Discharge Disposition (OT): skilled nursing facility  Therapy Frequency (OT Eval): other (see comments)(5-7 days/wk)  Daily Summary of Progress (OT): progress toward functional goals is gradual  Plan of Care Review  Plan of Care Reviewed With: patient  Plan of Care Reviewed With: patient  Outcome Summary: Pt performed B UE exercises with increased time and RBs. No goals met this tx.  Outcome Measures     Row Name 05/06/19 1125 05/05/19 1000 05/05/19 0805       How much help from another person do you currently need...    Turning from your back to your side while in flat bed without using bedrails?  --  3  -JA  --    Moving from lying on back to sitting on the side of a flat bed without  bedrails?  --  3  -JA  --    Moving to and from a bed to a chair (including a wheelchair)?  --  2  -JA  --    Standing up from a chair using your arms (e.g., wheelchair, bedside chair)?  --  2  -JA  --    Climbing 3-5 steps with a railing?  --  2  -JA  --    To walk in hospital room?  --  2  -JA  --    AM-PAC 6 Clicks Score  --  14  -JA  --       How much help from another is currently needed...    Putting on and taking off regular lower body clothing?  3  -BB  --  3  -BB    Bathing (including washing, rinsing, and drying)  3  -BB  --  3  -BB    Toileting (which includes using toilet bed pan or urinal)  3  -BB  --  3  -BB    Putting on and taking off regular upper body clothing  3  -BB  --  3  -BB    Taking care of personal grooming (such as brushing teeth)  3  -BB  --  3  -BB    Eating meals  3  -BB  --  3  -BB    Score  18  -BB  --  18  -BB       Functional Assessment    Outcome Measure Options  --  AM-PAC 6 Clicks Basic Mobility (PT)  -JA  --    Row Name 05/04/19 1015 05/03/19 1540          How much help from another person do you currently need...    Turning from your back to your side while in flat bed without using bedrails?  --  3  -TW     Moving from lying on back to sitting on the side of a flat bed without bedrails?  --  3  -TW     Moving to and from a bed to a chair (including a wheelchair)?  --  2  -TW     Standing up from a chair using your arms (e.g., wheelchair, bedside chair)?  --  2  -TW     Climbing 3-5 steps with a railing?  --  2  -TW     To walk in hospital room?  --  2  -TW     AM-PAC 6 Clicks Score  --  14  -TW        How much help from another is currently needed...    Putting on and taking off regular lower body clothing?  3  -LW  --     Bathing (including washing, rinsing, and drying)  3  -LW  --     Toileting (which includes using toilet bed pan or urinal)  3  -LW  --     Putting on and taking off regular upper body clothing  3  -LW  --     Taking care of personal grooming (such as  brushing teeth)  3  -LW  --     Eating meals  3  -LW  --     Score  18  -LW  --        Functional Assessment    Outcome Measure Options  --  AM-PAC 6 Clicks Basic Mobility (PT)  -TW       User Key  (r) = Recorded By, (t) = Taken By, (c) = Cosigned By    Initials Name Provider Type    Candelario Feliciano, PTA Physical Therapy Assistant    TW Suleman Kohli, PTA Physical Therapy Assistant    Netta Chilel COTA/L Occupational Therapy Assistant    Darcie Muse COTA/L Occupational Therapy Assistant           Time Calculation:   Time Calculation- OT     Row Name 05/06/19 1402             Time Calculation- OT    OT Start Time  1125  -BB      OT Stop Time  1150  -BB      OT Time Calculation (min)  25 min  -BB      Total Timed Code Minutes- OT  25 minute(s)  -BB      OT Received On  05/06/19  -DEE        User Key  (r) = Recorded By, (t) = Taken By, (c) = Cosigned By    Initials Name Provider Type    Netta Chilel COTA/L Occupational Therapy Assistant        Therapy Charges for Today     Code Description Service Date Service Provider Modifiers Qty    79670340062 HC OT SELF CARE/MGMT/TRAIN EA 15 MIN 5/5/2019 Netta Robles COTA/L GO 3    32328635881 HC OT THER PROC EA 15 MIN 5/6/2019 Netta Robles COTA/L GO 2            Non-skid socks and gait belt in place. Toileting offered. Call light and needs within reach. Pt advised to not get up alone and call the nurse for assistance.  Tag alarm on.       DANIEL Morales  5/6/2019

## 2019-05-06 NOTE — PLAN OF CARE
Problem: Patient Care Overview  Goal: Plan of Care Review  Outcome: Ongoing (interventions implemented as appropriate)   05/06/19 1401 05/06/19 1511   Coping/Psychosocial   Plan of Care Reviewed With patient --    Coping/Psychosocial   Patient Agreement with Plan of Care agrees --    Plan of Care Review   Progress no change --    OTHER   Outcome Summary --  pt sit>sup with SBA, sit<>stand with min assist, pt performed stand pivot W/C>bed with min assist of 1. pt would benefit from 24/7 care & continued pT services @ D/c

## 2019-05-06 NOTE — PROGRESS NOTES
"5/6/2019    Chief Complaint: suicidal ideation, dementia related behavioral issues and physical aggression    Subjective:  Patient is a 53 y.o. male who was seen on the geriatric side.    He is pleasantly confused today.  Engages fairly well.      Denies any depression or anxiety.  Denies any SI or HI.  No overt paranoia.    No AE to meds.  Denies any headache or stomachache or nausea.    No behavioral disturbances overnight.    States he did not sleep as well last night as he \"isn't at home.\"  D/w pt possibility of d/c to SNF pending continued improvement and he is in agreement.        Objective     Vital Signs    Temp:  [97.6 °F (36.4 °C)-98.3 °F (36.8 °C)] 97.6 °F (36.4 °C)  Heart Rate:  [80-81] 81  Resp:  [18] 18  BP: (106-111)/(58) 111/58    Physical Exam:   General Appearance: alert and appears stated age,  Hygiene:   fair  Gait & Station: Wheelchair  Musculoskeletal: spastic movements of his upper extremity    Mental Status Exam:   Cooperation:  Cooperative  Eye Contact:  fair  Psychomotor Behavior:  Appropriate  Mood: \"OK\"  Affect:  Pleasantly confused  Speech:  Normal   Thought Process:  DysCognitive  Associations: Goal Directed  Thought Content:     Mood congurent   Suicidal:  Denies   Homicidal:  None   Hallucinations:  Not demonstrated today   Delusion:  None expressed nor overt  Cognitive Functioning:   Fund of Info: Poor   Consciousness: awake and alert  Reliability:  Limited by NCD  Insight:  Limited by Neurocog d/o  Judgement:  improving  Impulse Control:  improving    Lab Results (last 24 hours)     ** No results found for the last 24 hours. **        Imaging Results (last 24 hours)     ** No results found for the last 24 hours. **          Medicine:   Current Facility-Administered Medications:   •  acetaminophen (TYLENOL) tablet 650 mg, 650 mg, Oral, Q4H PRN, Jolly Gamez MD  •  aluminum-magnesium hydroxide-simethicone (MAALOX MAX) 400-400-40 MG/5ML suspension 15 mL, 15 mL, Oral, Q6H PRN, " Jolly Gamez MD  •  amLODIPine (NORVASC) tablet 10 mg, 10 mg, Oral, Daily, Mery Cronin APRN, 10 mg at 05/06/19 0904  •  aspirin chewable tablet 81 mg, 81 mg, Oral, Daily, Jolly Gamez MD, 81 mg at 05/06/19 0904  •  atorvastatin (LIPITOR) tablet 10 mg, 10 mg, Oral, Nightly, Warner Bragg II, MD  •  CloNIDine (CATAPRES) tablet 0.1 mg, 0.1 mg, Oral, Q4H PRN, Jolly Gamez MD  •  cyclobenzaprine (FLEXERIL) tablet 10 mg, 10 mg, Oral, TID, Jolly Gamez MD, 10 mg at 05/06/19 0905  •  ergocalciferol (DRISDOL) 8000 UNIT/ML drops 2,000 Units, 2,000 Units, Oral, Daily, Jolly Gamez MD, 2,000 Units at 05/06/19 0905  •  folic acid (FOLVITE) tablet 1 mg, 1 mg, Oral, Daily, Jolly Gamez MD, 1 mg at 05/06/19 0905  •  hydrOXYzine pamoate (VISTARIL) capsule 50 mg, 50 mg, Oral, Q6H PRN, Jolly Gamez MD  •  lisinopril (PRINIVIL,ZESTRIL) tablet 10 mg, 10 mg, Oral, Daily, Jolly Gamez MD, 10 mg at 05/06/19 0905  •  loperamide (IMODIUM) capsule 2 mg, 2 mg, Oral, 4x Daily PRN, Jolly Gamez MD  •  magnesium hydroxide (MILK OF MAGNESIA) suspension 2400 mg/10mL 10 mL, 10 mL, Oral, Daily PRN, Jolly Gamez MD  •  melatonin tablet 3 mg, 3 mg, Oral, Nightly, Jolly Gamez MD, 3 mg at 05/05/19 2048  •  nitroglycerin (NITROSTAT) SL tablet 0.4 mg, 0.4 mg, Sublingual, Q5 Min PRN, Jolly Gamez MD  •  pantoprazole (PROTONIX) EC tablet 40 mg, 40 mg, Oral, [facility], Jolly Gamez MD, 40 mg at 05/06/19 0905  •  polyethylene glycol (MIRALAX) powder 17 g, 17 g, Oral, Daily, Jolly Gamez MD, 17 g at 05/06/19 0904  •  risperiDONE (risperDAL) tablet 1 mg, 1 mg, Oral, Daily, 1 mg at 05/06/19 0905 **AND** risperiDONE (risperDAL) tablet 1.5 mg, 1.5 mg, Oral, Daily, Warner Bragg II, MD, 1.5 mg at 05/06/19 0906  •  [COMPLETED] sertraline (ZOLOFT) tablet 25 mg, 25 mg, Oral, Daily, 25 mg at 04/29/19 1521 **FOLLOWED BY** sertraline (ZOLOFT) tablet 125 mg,  125 mg, Oral, Daily, Warner Bragg II, MD, 125 mg at 05/06/19 0905  •  tamsulosin (FLOMAX) 24 hr capsule 0.4 mg, 0.4 mg, Oral, Nightly, Jolly Gamez MD, 0.4 mg at 05/05/19 2048  •  traZODone (DESYREL) tablet 50 mg, 50 mg, Oral, Nightly PRN, Jolly Gamez MD, 50 mg at 05/05/19 2049  •  ziprasidone (GEODON) injection 10 mg, 10 mg, Intramuscular, Daily PRN, Mery Cronin APRN, 10 mg at 05/03/19 2109    Diagnoses/Assessment:     Dementia with behavioral disturbance    Severe episode of recurrent major depressive disorder, with psychotic features (CMS/HCC)    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  2) Will continue to provide treatment with the unit milieu, activities, therapies and psychopharmacological management.  3) Patient to be placed on or continued on  Q15 minute checks  and Suicide and Fall precautions.  4) Pertinent medical issues: No active medical concerns.  5) Will order following labs: none  6) Will make the following medication changes: Will cont the risperdal 1mg qAM &  1.5mg qHS for paranoia & psychosis.  Zoloft daily at 125mg.  7) Will continue discharge planning as appropriate for patient.  8) Psychotherapy provided for less than 16 minutes.    Treatment plan and medication risks and benefits discussed with: Patient & Tx team    Warner Bragg II, MD  05/06/19  1:23 PM

## 2019-05-06 NOTE — THERAPY TREATMENT NOTE
Acute Care - Physical Therapy Treatment Note  [facility]     Patient Name: Sg Vega  : 1965  MRN: [id number]  Today's Date: 2019  Onset of Illness/Injury or Date of Surgery: 19  Date of Referral to PT: 19  Referring Physician: FERNANDO Gutierrez    Admit Date: 2019    Visit Dx:    ICD-10-CM ICD-9-CM   1. Oral phase dysphagia R13.11 787.21   2. Impaired functional mobility, balance, and endurance Z74.09 V49.89   3. 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       Therapy Treatment    Rehabilitation Treatment Summary     Row Name 19 1337 19 1125          Treatment Time/Intention    Discipline  physical therapy assistant  -TA  occupational therapy assistant  -BB     Document Type  therapy note (daily note)  -TA  therapy note (daily note)  -BB     Subjective Information  complains of light headed  -TA  no complaints  -BB     Mode of Treatment  physical therapy;individual therapy  -TA  individual therapy;occupational therapy  -BB     Patient/Family Observations  --  Pt in W/C sitting at table  -BB     Therapy Frequency (PT Clinical Impression)  daily  -TA  --     Total Minutes, Occupational Therapy Treatment  --  25  -BB     Therapy Frequency (OT Eval)  --  other (see comments) 5-7 days/wk  -BB     Patient Effort  good  -TA  adequate  -BB     Existing Precautions/Restrictions  fall;other (see comments) behavorial, aggressive, paranoia.  -TA  fall;other (see comments) behavioral aggression, paranoria  -BB     Patient Response to Treatment  pt requested to lie down  -TA  --     Recorded by [TA] Kaur Mitchell, KAL 19 1510 [BB] Netta Robles COTA/L 19 1400     Row Name 19 5977              Vital Signs    Pretreatment Heart Rate (beats/min)  77  -TA      Posttreatment Heart Rate (beats/min)  79  -TA      Pre SpO2 (%)  98  -TA      O2 Delivery Pre Treatment  room air  -TA      Post SpO2 (%)  98  -TA      O2 Delivery Post Treatment  room air  -TA      Pre Patient Position  Sitting  -TA      Post Patient Position  Supine  -TA      Recorded by [TA] Kaur Mitchell, PTA 05/06/19 1510      Row Name 05/06/19 1337 05/06/19 1125          Cognitive Assessment/Intervention- PT/OT    Affect/Mental Status (Cognitive)  flat/blunted affect  -TA  flat/blunted affect  -BB     Orientation Status (Cognition)  oriented to;person  -TA  oriented to;person  -BB     Follows Commands (Cognition)  follows one step commands  -TA  follows one step commands  -BB     Cognitive Function (Cognitive)  safety deficit  -TA  --     Personal Safety Interventions  fall prevention program maintained;gait belt;supervised activity;nonskid shoes/slippers when out of bed  -TA  fall prevention program maintained;gait belt;muscle strengthening facilitated;nonskid shoes/slippers when out of bed  -BB     Recorded by [TA] Kaur Mitchell, PTA 05/06/19 1510 [BB] Netta Robles COTA/L 05/06/19 1400     Row Name 05/06/19 1337             Bed Mobility Assessment/Treatment    Scooting/Bridging [place] (Bed Mobility)  supervision  -TA      Supine-Sit [place] (Bed Mobility)  not tested  -TA      Sit-Supine [place] (Bed Mobility)  supervision  -TA      Recorded by [TA] Kaur Mitchell, PTA 05/06/19 1510      Row Name 05/06/19 1337             Functional Mobility    Functional Mobility- Ind. Level  minimum assist (75% patient effort)  -TA      Functional Mobility- Device  -- no AD  -TA      Functional Mobility-Distance (Feet)  3  -TA      Recorded by [TA] Kaur Mitchell, PTA 05/06/19 1510      Row Name 05/06/19 1337             Transfer Assessment/Treatment    Transfer Assessment/Treatment  chair-bed transfer;stand pivot/stand  step transfer  -TA      Maintains Weight-bearing Status (Transfers)  other (see comments)  -TA      Recorded by [TA] Kaur Mitchell, PTA 05/06/19 1510      Row Name 05/06/19 1337             Chair-Bed Transfer    Chair-Bed [place] (Transfers)  minimum assist (75% patient effort)  -TA      Recorded by [TA] Kaur Mitchell, PTA 05/06/19 1510      Row Name 05/06/19 1337             Sit-Stand Transfer    Sit-Stand [place] (Transfers)  minimum assist (75% patient effort)  -TA      Assistive Device (Sit-Stand Transfers)  --  -TA      Recorded by [TA] Kaur Mitchell, PTA 05/06/19 1510      Row Name 05/06/19 1337             Stand-Sit Transfer    Stand-Sit [place] (Transfers)  minimum assist (75% patient effort)  -TA      Assistive Device (Stand-Sit Transfers)  --  -TA      Recorded by [TA] Kaur Mitchell, PTA 05/06/19 1510      Row Name 05/06/19 1337             Gait/Stairs Assessment/Training    Gait/Stairs Assessment/Training  --  -TA      [place] Level (Gait)  --  -TA      Assistive Device (Gait)  --  -TA      Deviations/Abnormal Patterns (Gait)  --  -TA      Bilateral Gait Deviations  --  -TA      Recorded by [TA] Kaur Mitchell, PTA 05/06/19 1510      Row Name 05/06/19 1337 05/06/19 1125          Therapeutic Exercise    Upper Extremity Range of Motion (Therapeutic Exercise)  --  shoulder flexion/extension, bilateral;shoulder internal/external rotation, bilateral;elbow flexion/extension, bilateral;forearm supination/pronation, bilateral;wrist flexion/extension, bilateral L shoulder ROM limited  -BB     Hand (Therapeutic Exercise)  --  finger flexion/extension, bilateral  -BB     Lower Extremity Range of Motion (Therapeutic Exercise)  ankle dorsiflexion/plantar flexion, bilateral  -TA  --     Exercise Type (Therapeutic Exercise)  --  AROM (active range of motion)  -BB     Position (Therapeutic Exercise)  seated  -TA  seated  -BB     Sets/Reps (Therapeutic Exercise)  10  -TA  1x15  -BB      Expected Outcome (Therapeutic Exercise)  facilitate normal movement patterns;improve functional stability;improve motor control;increase active range of motion  -TA  improve functional stability;improve functional tolerance, self-care activity;improve performance, transfer skills  -BB     Recorded by [TA] Kaur Mitchell, PTA 05/06/19 1510 [BB] Netta Robles RESENDEZ/L 05/06/19 1400     Row Name 05/06/19 1337 05/06/19 1125          Positioning and Restraints    Pre-Treatment Position  other (comment) W/C  -TA  sitting in chair/recliner  -BB     Post Treatment Position  bed  -TA  wheelchair  -BB     In Bed  supine;call light within reach;exit alarm on  -TA  sitting;call light within reach;encouraged to call for assist;exit alarm on  -BB     Recorded by [TA] Kaur Mitchell, PTA 05/06/19 1510 [BB] Netta Robles RESENDEZ/L 05/06/19 1400     Row Name 05/06/19 1337 05/06/19 1125          Pain Scale: Numbers Pre/Post-Treatment    Pain Scale: Numbers, Pretreatment  0/10 - no pain  -TA  0/10 - no pain  -BB     Pain Scale: Numbers, Post-Treatment  0/10 - no pain  -TA  0/10 - no pain  -BB     Recorded by [TA] Kaur Mitchell, PTA 05/06/19 1510 [BB] Nteta Robles RESENDEZ/L 05/06/19 1400     Row Name 05/06/19 1125             Outcome Summary/Treatment Plan (OT)    Daily Summary of Progress (OT)  progress toward functional goals is gradual  -BB      Plan for Continued Treatment (OT)  continue POC  -BB      Anticipated Discharge Disposition (OT)  skilled nursing facility  -BB      Recorded by [BB] Netta Robles COTA/L 05/06/19 1400      Row Name 05/06/19 1337             Outcome Summary/Treatment Plan (PT)    Daily Summary of Progress (PT)  progress toward functional goals is gradual  -TA      Plan for Continued Treatment (PT)  continue  -TA      Anticipated Discharge Disposition (PT)  skilled nursing facility  -TA      Recorded by [TA] Kaur Mitchell, PTA 05/06/19 1510        User Key  (r) = Recorded By, (t) =  Taken By, (c) = Cosigned By    Initials Name Effective Dates Discipline    TA Kaur Mitchell, PTA 03/07/18 -  PT    BB Netta Robles, RESENDEZ/L 03/07/18 -  OT               Rehab Goal Summary     Row Name 05/06/19 1337 05/06/19 1125          Physical Therapy Goals    Bed Mobility Goal Selection (PT)  bed mobility, PT goal 1  -TA  --     Transfer Goal Selection (PT)  transfer, PT goal 1  -TA  --     Gait Training Goal Selection (PT)  gait training, PT goal 1  -TA  --        Bed Mobility Goal 1 (PT)    Activity/Assistive Device (Bed Mobility Goal 1, PT)  sit to supine;supine to sit;scooting  -TA  --     [place] Level/Cues Needed (Bed Mobility Goal 1, PT)  standby assist  -TA  --     Time Frame (Bed Mobility Goal 1, PT)  by discharge  -TA  --     Progress/Outcomes (Bed Mobility Goal 1, PT)  goal not met  -TA  --        Transfer Goal 1 (PT)    Activity/Assistive Device (Transfer Goal 1, PT)  bed-to-chair/chair-to-bed  -TA  --     [place] Level/Cues Needed (Transfer Goal 1, PT)  contact guard assist  -TA  --     Time Frame (Transfer Goal 1, PT)  by discharge  -TA  --     Progress/Outcome (Transfer Goal 1, PT)  goal not met  -TA  --        Gait Training Goal 1 (PT)    Activity/Assistive Device (Gait Training Goal 1, PT)  gait (walking locomotion);walker, rolling  -TA  --     [place] Level (Gait Training Goal 1, PT)  contact guard assist  -TA  --     Distance (Gait Goal 1, PT)  15' x 1  -TA  --     Time Frame (Gait Training Goal 1, PT)  by discharge  -TA  --     Barriers (Gait Training Goal 1, PT)  MS, agitation  -TA  --     Progress/Outcome (Gait Training Goal 1, PT)  goal not met  -TA  --        Occupational Therapy Goals    Transfer Goal Selection (OT)  --  transfer, OT goal 1  -BB     Toileting Goal Selection (OT)  --  toileting, OT goal 1  -BB     Self-Feeding Goal Selection (OT)  --  self feeding, OT goal 1  -BB     Strength Goal Selection (OT)  --  strength, OT goal 1  -BB     Coordination Goal  Selection (OT)  --  coordination, OT goal 1  -BB        Transfer Goal 1 (OT)    Activity/Assistive Device (Transfer Goal 1, OT)  --  sit-to-stand/stand-to-sit;bed-to-chair/chair-to-bed;toilet;walker, rolling  -BB     [place] Level/Cues Needed (Transfer Goal 1, OT)  --  contact guard assist  -BB     Time Frame (Transfer Goal 1, OT)  --  long term goal (LTG);by discharge  -BB     Progress/Outcome (Transfer Goal 1, OT)  --  goal not met  -BB        Toileting Goal 1 (OT)    Activity/Device (Toileting Goal 1, OT)  --  toileting skills, all  -BB     [place] Level/Cues Needed (Toileting Goal 1, OT)  --  contact guard assist  -BB     Time Frame (Toileting Goal 1, OT)  --  long term goal (LTG);by discharge  -BB     Progress/Outcome (Toileting Goal 1, OT)  --  goal not met  -BB        Self-Feeding Goal 1 (OT)    Activity/Assistive Device (Self-Feeding Goal 1, OT)  --  self-feeding skills, all  -BB     [place] Level/Cues Needed (Self-Feeding Goal 1, OT)  --  set-up required;supervision required  -BB     Time Frame (Self-Feeding Goal 1, OT)  --  long term goal (LTG);by discharge  -BB     Progress/Outcomes (Self-Feeding Goal 1, OT)  --  goal not met  -BB        Strength Goal 1 (OT)    Strength Goal 1 (OT)  --  Pt will complete BUE AROM exercises in all planes to increase strength by 1/2 grade to increase functional independence with ADLs.   -BB     Time Frame (Strength Goal 1, OT)  --  long term goal (LTG);by discharge  -BB     Progress/Outcome (Strength Goal 1, OT)  --  goal not met  -BB        Coordination Goal 1 (OT)    Activity/Assistive Device (Coordination Goal 1, OT)  --  GM task;FM task  -BB     [place] Level/Cues Needed (Coordination Goal 1, OT)  --  minimum assist (75% or more patient effort);verbal cues required  -BB     Time Frame (Coordination Goal 1, OT)  --  long term goal (LTG);by discharge  -BB     Progress/Outcomes (Coordination Goal 1, OT)  --  goal not met  -BB       User Key  (r) =  Recorded By, (t) = Taken By, (c) = Cosigned By    Initials Name Provider Type Discipline    TA Kaur Mitchell, PTA Physical Therapy Assistant PT    Netta Chilel, DIPTI/L Occupational Therapy Assistant OT              PT Recommendation and Plan  Anticipated Discharge Disposition (PT): skilled nursing facility  Therapy Frequency (PT Clinical Impression): daily  Outcome Summary/Treatment Plan (PT)  Daily Summary of Progress (PT): progress toward functional goals is gradual  Plan for Continued Treatment (PT): continue  Anticipated Discharge Disposition (PT): skilled nursing facility  Outcome Summary: pt sit>sup with SBA, sit<>stand with min assist, pt performed stand pivot W/C>bed with min assist of 1. pt would benefit from 24/7 care & continued pT services @ D/c  Outcome Measures     Row Name 05/06/19 1500 05/06/19 1125 05/05/19 1000       How much help from another person do you currently need...    Turning from your back to your side while in flat bed without using bedrails?  3  -TA  --  3  -JA    Moving from lying on back to sitting on the side of a flat bed without bedrails?  3  -TA  --  3  -JA    Moving to and from a bed to a chair (including a wheelchair)?  2  -TA  --  2  -JA    Standing up from a chair using your arms (e.g., wheelchair, bedside chair)?  2  -TA  --  2  -JA    Climbing 3-5 steps with a railing?  2  -TA  --  2  -JA    To walk in hospital room?  2  -TA  --  2  -JA    AM-PAC 6 Clicks Score  14  -TA  --  14  -JA       How much help from another is currently needed...    Putting on and taking off regular lower body clothing?  --  3  -BB  --    Bathing (including washing, rinsing, and drying)  --  3  -BB  --    Toileting (which includes using toilet bed pan or urinal)  --  3  -BB  --    Putting on and taking off regular upper body clothing  --  3  -BB  --    Taking care of personal grooming (such as brushing teeth)  --  3  -BB  --    Eating meals  --  3  -BB  --    Score  --  18  -BB  --        Functional Assessment    Outcome Measure Options  AM-PAC 6 Clicks Basic Mobility (PT)  -TA  --  AM-PAC 6 Clicks Basic Mobility (PT)  -JA    Row Name 05/05/19 0805 05/04/19 1015 05/03/19 1540       How much help from another person do you currently need...    Turning from your back to your side while in flat bed without using bedrails?  --  --  3  -TW    Moving from lying on back to sitting on the side of a flat bed without bedrails?  --  --  3  -TW    Moving to and from a bed to a chair (including a wheelchair)?  --  --  2  -TW    Standing up from a chair using your arms (e.g., wheelchair, bedside chair)?  --  --  2  -TW    Climbing 3-5 steps with a railing?  --  --  2  -TW    To walk in hospital room?  --  --  2  -TW    AM-PAC 6 Clicks Score  --  --  14  -TW       How much help from another is currently needed...    Putting on and taking off regular lower body clothing?  3  -BB  3  -LW  --    Bathing (including washing, rinsing, and drying)  3  -BB  3  -LW  --    Toileting (which includes using toilet bed pan or urinal)  3  -BB  3  -LW  --    Putting on and taking off regular upper body clothing  3  -BB  3  -LW  --    Taking care of personal grooming (such as brushing teeth)  3  -BB  3  -LW  --    Eating meals  3  -BB  3  -LW  --    Score  18  -BB  18  -LW  --       Functional Assessment    Outcome Measure Options  --  --  AM-PAC 6 Clicks Basic Mobility (PT)  -TW      User Key  (r) = Recorded By, (t) = Taken By, (c) = Cosigned By    Initials Name Provider Type    Candelario Feliciano, PTA Physical Therapy Assistant    TA Kaur Mitchell, PTA Physical Therapy Assistant    Suleman Lopez, PTA Physical Therapy Assistant    Netta Chilel, RESENDEZ/L Occupational Therapy Assistant    Darcie Muse, RESENDEZ/L Occupational Therapy Assistant         Time Calculation:   PT Charges     Row Name 05/06/19 1513             Time Calculation    Start Time  1337  -TA      Stop Time  1350  -TA      Time  Calculation (min)  13 min  -TA         Time Calculation- PT    Total Timed Code Minutes- PT  13 minute(s)  -TA        User Key  (r) = Recorded By, (t) = Taken By, (c) = Cosigned By    Initials Name Provider Type    Kaur Delgado PTA Physical Therapy Assistant        Therapy Charges for Today     Code Description Service Date Service Provider Modifiers Qty    40735024769 HC PT THERAPEUTIC ACT EA 15 MIN 5/6/2019 Kaur Mitchell PTA GP 1          PT G-Codes  Outcome Measure Options: AM-PAC 6 Clicks Basic Mobility (PT)  AM-PAC 6 Clicks Score: 14  Score: 18    Kaur Mitchell PTA  5/6/2019

## 2019-05-06 NOTE — PROGRESS NOTES
REENA received call from Kassandra at [facility] requesting update on pt's behaviors. SRINIW provided clinical update, discussed possibility that pt may be at baseline. REENA advised Kassandra that pt will likely dc back to [facility] tomorrow or Wednesday. Kassandra verbalized understanding. REENA faxed notes to aKssandra for an update. Will follow up tomorrow re: facilitating dc.

## 2019-05-06 NOTE — NURSING NOTE
Behavior   Pt sleeping. Patient has slept most of the night. Patient only woke up a couple of times and went back to sleep.    Intervention  Safety Rounds complete    Response  Pt sleeping    Plan  Continue current plan

## 2019-05-06 NOTE — NURSING NOTE
"Behavior   Note any precipitants to event or behavior   Describe level and action of any aggressive behavior or speech and associated interventions.     Anxiety: Patient denies at this time  Depression: Patient denies at this time  Pain  0  AVH   no  S/I   no  Plan  no  H/I   no  Plan  no    Affect   blunted      Note: Patient interacted appropriately with staff. Patient had intermittant eye contact. Patient reports not sleeping well because \"I am not in my usual home.\" Patient needs met. Will continue to monitor.      Intervention    PRN medication utilized:  no    Instructed in medication usage and effects  Medications administered as ordered  Encouraged to verbalize needs      Response    Verbalized understanding   Did patient take medications as ordered yes   Did patient interact with assessment?  yes     Plan    Will monitor for safety  Will monitor every 15 minutes as ordered  Will evaluate and promote the plan of care    Last BM:  May 3, 2019  (Please chart in I/O as well)    "

## 2019-05-06 NOTE — PLAN OF CARE
Problem: Behavioral Impairment (Dementia Signs/Symptoms) (Adult)  Goal: Improved Behavioral Control (Dementia Signs/Symptoms)  Outcome: Ongoing (interventions implemented as appropriate)   05/06/19 1102   Improved Behavioral Control (Dementia Signs/Symptoms)   Improved Behavioral Control Action Step (STG) Outcome making progress toward outcome

## 2019-05-07 VITALS
RESPIRATION RATE: 18 BRPM | SYSTOLIC BLOOD PRESSURE: 90 MMHG | HEART RATE: 82 BPM | HEIGHT: 74 IN | DIASTOLIC BLOOD PRESSURE: 59 MMHG | BODY MASS INDEX: 19.71 KG/M2 | OXYGEN SATURATION: 100 % | WEIGHT: 153.6 LBS | TEMPERATURE: 98.6 F

## 2019-05-07 PROCEDURE — 99239 HOSP IP/OBS DSCHRG MGMT >30: CPT | Performed by: PSYCHIATRY & NEUROLOGY

## 2019-05-07 PROCEDURE — 97530 THERAPEUTIC ACTIVITIES: CPT

## 2019-05-07 RX ORDER — ACETAMINOPHEN 325 MG/1
650 TABLET ORAL EVERY 8 HOURS PRN
Qty: 100 TABLET | Refills: 0 | Status: SHIPPED | OUTPATIENT
Start: 2019-05-07

## 2019-05-07 RX ORDER — LANOLIN ALCOHOL/MO/W.PET/CERES
3 CREAM (GRAM) TOPICAL NIGHTLY
Qty: 30 TABLET | Refills: 0 | Status: SHIPPED | OUTPATIENT
Start: 2019-05-07

## 2019-05-07 RX ORDER — RISPERIDONE 1 MG/1
1 TABLET ORAL DAILY
Qty: 30 TABLET | Refills: 0 | Status: SHIPPED | OUTPATIENT
Start: 2019-05-08

## 2019-05-07 RX ORDER — RISPERIDONE 0.5 MG/1
1.5 TABLET ORAL DAILY
Qty: 90 TABLET | Refills: 0 | Status: SHIPPED | OUTPATIENT
Start: 2019-05-08

## 2019-05-07 RX ORDER — TRAZODONE HYDROCHLORIDE 50 MG/1
50 TABLET ORAL NIGHTLY PRN
Qty: 30 TABLET | Refills: 0 | Status: SHIPPED | OUTPATIENT
Start: 2019-05-07

## 2019-05-07 RX ADMIN — LISINOPRIL 10 MG: 10 TABLET ORAL at 08:50

## 2019-05-07 RX ADMIN — POLYETHYLENE GLYCOL 3350 17 G: 17 POWDER, FOR SOLUTION ORAL at 08:49

## 2019-05-07 RX ADMIN — ASPIRIN 81 MG CHEWABLE TABLET 81 MG: 81 TABLET CHEWABLE at 08:49

## 2019-05-07 RX ADMIN — CYCLOBENZAPRINE HYDROCHLORIDE 10 MG: 10 TABLET, FILM COATED ORAL at 17:06

## 2019-05-07 RX ADMIN — CYCLOBENZAPRINE HYDROCHLORIDE 10 MG: 10 TABLET, FILM COATED ORAL at 08:50

## 2019-05-07 RX ADMIN — AMLODIPINE BESYLATE 10 MG: 10 TABLET ORAL at 08:49

## 2019-05-07 RX ADMIN — SERTRALINE HYDROCHLORIDE 125 MG: 50 TABLET ORAL at 08:50

## 2019-05-07 RX ADMIN — ERGOCALCIFEROL SOLN 200 MCG/ML (8000 UNIT/ML) 2000 UNITS: 8000 SOLUTION at 08:49

## 2019-05-07 RX ADMIN — RISPERIDONE 1 MG: 0.5 TABLET ORAL at 08:50

## 2019-05-07 RX ADMIN — PANTOPRAZOLE SODIUM 40 MG: 40 TABLET, DELAYED RELEASE ORAL at 08:50

## 2019-05-07 RX ADMIN — FOLIC ACID 1 MG: 1 TABLET ORAL at 08:49

## 2019-05-07 RX ADMIN — RISPERIDONE 1.5 MG: 0.5 TABLET ORAL at 08:50

## 2019-05-07 NOTE — PLAN OF CARE
Problem: Patient Care Overview  Goal: Plan of Care Review  Outcome: Ongoing (interventions implemented as appropriate)  Encourage intake of meals and supplements.   05/07/19 5711   Coping/Psychosocial   Plan of Care Reviewed With patient   Plan of Care Review   Progress no change   OTHER   Outcome Summary Intake 100% - 5x plus 100% - 2x, 0% - 1x for snacks.

## 2019-05-07 NOTE — NURSING NOTE
"Patient is accusing staff of giving him RC and then \"taking it away.\" Explained to patient that per [facility] rules patient's aren't allowed to have caffeine outside of meal times. Patient is not agreeable to this. Offered water, juice, or milk and patient refused. Will continue to monitor.   "

## 2019-05-07 NOTE — NURSING NOTE
Patient has been calm and cooperative today. Patient reports sleeping well. Patient reports being ready to go back to the nursing home. Patient waiting on PACS. PACS reports they would be here at 1800. Patient needs met. Will continue to monitor.

## 2019-05-07 NOTE — DISCHARGE PLACEMENT REQUEST
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 unit milieu, activities, therapies and psychopharmacological management.  3) Patient to be placed on or continued on  Q15 minute checks  and Suicide precautions.  4) Pertinent medical issues: No active medical concerns.  5) Will order following labs: none  6) Will make the following medication changes: Will increase the risperdal to 1mg bid.  Will stop the buspar 10mg tid.  Will continue zoloft 50mg today.  7) Will continue discharge planning as appropriate for patient.  8) Psychotherapy provided for less than 16 minutes.    Treatment plan and medication risks and benefits discussed with: Patient    Jolly Gamez MD  05/01/19  11:40 AM    Electronically signed by Jolly Gamez MD at 5/1/2019 11:48 AM     Jolly Gamez MD at 4/30/2019  1:09 PM          4/30/2019    Chief Complaint: suicidal ideation, dementia related behavioral issues and physical aggression    Subjective:  Patient is a 53 y.o. male who was hospitalized for suicidal ideation, dementia related behavioral issues and physical aggression.       Patient was less irritable in his conversation with me today.  He talked about being a kid and how he collected change to pay for things.  It appears that his family was poor when they grew up.   "    Nurse Jung reported in her note from yesterday afternoon: \"When MHT Vernell was cleaning him, he stated \"All you all want to do is rape me\". ... Pt complained about staff while we were assisting him, \"All you want to do is hurt me\". ... Earlier in the shift pt asked various staff members for \"change\" (money). When told we could not give him money he stated \"All you want to do is arvind me\".    Nurse Jung reported in her note from yesterday evening: \"Pt threw part of his ham sandwich in the floor, blaming it on male peer. ... He had verbal altercation with male peer, telling him to  \"shut up\".\"    Nurse Ruth reported last night: \" Patient keeps stating \"I know someone on the world news and I'm going to tell them. He keeps stating we are \"liars.\" \"    Objective     Vital Signs    Temp:  [97 °F (36.1 °C)-97.7 °F (36.5 °C)] 97.7 °F (36.5 °C)  Heart Rate:  [84-89] 89  Resp:  [18] 18  BP: ()/(55-64) 94/56    Physical Exam:   General Appearance: alert and appears stated age,  Hygiene:   fair  Gait & Station: Wheelchair  Musculoskeletal: spastic movements of his upper extremity    Mental Status Exam:   Cooperation:  Cooperative  Eye Contact:  Good  Psychomotor Behavior:  Appropriate  Mood: \"Fine\"  Affect:  bright  Speech:  Normal  Thought Process:  Poverty of thought  Associations: Disorganized  Thought Content:     Mood congurent   Suicidal:  Denies   Homicidal:  None   Hallucinations:  Not demonstrated today   Delusion:  Unable to demonstrate  Cognitive Functioning:   Consciousness: awake and alert  Reliability:  poor  Insight:  Poor  Judgement:  Impaired  Impulse Control:  Impaired    Lab Results (last 24 hours)     Procedure Component Value Units Date/Time    POC Glucose Once [[id number]]  (Normal) Collected:  04/30/19 1149    Specimen:  Blood Updated:  04/30/19 1217     Glucose 106 mg/dL      Comment: : [id number] STONE BRITTANYMeter ID: [id number]           Imaging Results (last 24 hours)     ** No " results found for the last 24 hours. **          Medicine:   Current Facility-Administered Medications:   •  acetaminophen (TYLENOL) tablet 650 mg, 650 mg, Oral, Q4H PRN, Jolly Gamez MD  •  aluminum-magnesium hydroxide-simethicone (MAALOX MAX) 400-400-40 MG/5ML suspension 15 mL, 15 mL, Oral, Q6H PRN, Jolly Gamez MD  •  amLODIPine (NORVASC) tablet 10 mg, 10 mg, Oral, Daily, Mery Cronin APRN, 10 mg at 04/28/19 0833  •  aspirin chewable tablet 81 mg, 81 mg, Oral, Daily, Jolly Gamez MD, 81 mg at 04/30/19 0801  •  atorvastatin (LIPITOR) tablet 10 mg, 10 mg, Oral, Daily, Jolly Gamez MD, 10 mg at 04/30/19 0801  •  busPIRone (BUSPAR) tablet 10 mg, 10 mg, Oral, TID, Jolly Gamez MD, 10 mg at 04/30/19 0800  •  CloNIDine (CATAPRES) tablet 0.1 mg, 0.1 mg, Oral, Q4H PRN, Jolly Gamez MD  •  cyclobenzaprine (FLEXERIL) tablet 10 mg, 10 mg, Oral, TID, Jolly Gamez MD, 10 mg at 04/30/19 0800  •  ergocalciferol (DRISDOL) 8000 UNIT/ML drops 2,000 Units, 2,000 Units, Oral, Daily, Jolly Gamez MD, 2,000 Units at 04/30/19 0800  •  folic acid (FOLVITE) tablet 1 mg, 1 mg, Oral, Daily, Jloly Gamez MD, 1 mg at 04/30/19 0801  •  hydrOXYzine pamoate (VISTARIL) capsule 50 mg, 50 mg, Oral, Q6H PRN, Jolly Gamez MD  •  lisinopril (PRINIVIL,ZESTRIL) tablet 10 mg, 10 mg, Oral, Daily, Jolly Gamez MD, 10 mg at 04/30/19 0800  •  loperamide (IMODIUM) capsule 2 mg, 2 mg, Oral, 4x Daily PRN, Jolly Gamez MD  •  magnesium hydroxide (MILK OF MAGNESIA) suspension 2400 mg/10mL 10 mL, 10 mL, Oral, Daily PRN, Jolly Gamez MD  •  melatonin tablet 3 mg, 3 mg, Oral, Nightly, Jolly Gamez MD, 3 mg at 04/29/19 2017  •  nitroglycerin (NITROSTAT) SL tablet 0.4 mg, 0.4 mg, Sublingual, Q5 Min PRN, Jolly Gamez MD  •  pantoprazole (PROTONIX) EC tablet 40 mg, 40 mg, Oral, QAM, Jolly Gamez MD, 40 mg at 04/30/19 0603  •  polyethylene glycol (MIRALAX)  powder 17 g, 17 g, Oral, Daily, Jolly Gamez MD, 17 g at 04/30/19 0929  •  risperiDONE (risperDAL) tablet 1 mg, 1 mg, Oral, Nightly, Jolly Gamez MD, 1 mg at 04/29/19 2017  •  [COMPLETED] sertraline (ZOLOFT) tablet 25 mg, 25 mg, Oral, Daily, 25 mg at 04/29/19 1521 **FOLLOWED BY** sertraline (ZOLOFT) tablet 50 mg, 50 mg, Oral, Daily, Jolly Gamez MD, 50 mg at 04/30/19 0801  •  tamsulosin (FLOMAX) 24 hr capsule 0.4 mg, 0.4 mg, Oral, Nightly, Jolly Gamez MD, 0.4 mg at 04/29/19 2017  •  traZODone (DESYREL) tablet 50 mg, 50 mg, Oral, Nightly PRN, Jolly Gamez MD, 50 mg at 04/28/19 2026  •  venlafaxine XR (EFFEXOR-XR) 24 hr capsule 37.5 mg, 37.5 mg, Oral, Daily, Jolly Gamez MD, 37.5 mg at 04/30/19 0801    Diagnoses/Assessment:     Dementia with behavioral disturbance    Severe episode of recurrent major depressive disorder, with psychotic features (CMS/HCC)    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  2) Will continue to provide treatment with the unit milieu, activities, therapies and psychopharmacological management.  3) Patient to be placed on or continued on  Q15 minute checks  and Suicide precautions.  4) Pertinent medical issues: No active medical concerns.  5) Will order following labs: none  6) Will make the following medication changes: Will continue the risperdal 1mg qhs.  Will stop the effexor xr to 37.5mg daily.  Will continue buspar 10mg tid.  Will increase zoloft to 50mg today.  7) Will continue discharge planning as appropriate for patient.  8) Psychotherapy provided for less than 16 minutes.    Treatment plan and medication risks and benefits discussed with: Patient    Jolly Gamez MD  04/30/19  1:09 PM    Electronically signed by Jolly Gamez MD at 4/30/2019  1:17 PM      Jolly Gamez MD at 4/29/2019  2:11 PM          4/29/2019    Chief Complaint: suicidal ideation, dementia related behavioral issues and physical aggression    Subjective:  Patient is a 53 y.o. male who was hospitalized for suicidal ideation, dementia related behavioral issues and physical aggression.       Patient was more irritable in his conversation with me today.  He asked me to give him my change.  I could not ascertain why he wanted the money.  He knew that he was at a hospital but was not cooperative to do further orientation questions.    Nurse Jung reported that he told her that we were robbing him and he wanted her change.    Objective     Vital Signs    Temp:  [98.7 °F (37.1 °C)-98.8 °F (37.1 °C)] 98.8 °F (37.1 °C)  Heart Rate:  [75-91] 75  Resp:  [18] 18  BP: ()/(49-64) 107/64    Physical Exam:   General Appearance: alert and appears stated age,  Hygiene:   fair  Gait & Station: Wheelchair  Musculoskeletal: spastic movements of his upper extremity    Mental Status Exam:   Cooperation:  Suspicious  Eye Contact:  Good  Psychomotor Behavior:  Appropriate  Mood: Irritable  Affect:  constricted and mood-congruent  Speech:  irritated tone  Thought Process:  Poverty of thought  Associations: Disorganized  Thought Content:     Mood congurent   Suicidal:  Did not express   Homicidal:  None   Hallucinations:  Not demonstrated today   Delusion:  Paranoid  Cognitive Functioning:   Consciousness: awake and alert and Orientation:  Unable to evaluate fully due to his lack of cooperation  Reliability:  poor  Insight:  Poor  Judgement:  Impaired  Impulse Control:  Impaired    Lab Results (last 24 hours)     ** No results found for the last 24 hours. **        Imaging Results (last 24 hours)     ** No results found for the last 24 hours. **          Medicine:   Current Facility-Administered Medications:   •  acetaminophen (TYLENOL) tablet 650 mg, 650 mg, Oral, Q4H PRN, Jolly Gamez MD  •   aluminum-magnesium hydroxide-simethicone (MAALOX MAX) 400-400-40 MG/5ML suspension 15 mL, 15 mL, Oral, Q6H PRN, Jolly Gamez MD  •  amLODIPine (NORVASC) tablet 10 mg, 10 mg, Oral, Daily, Mery Cronin, APRN, 10 mg at 04/28/19 0833  •  aspirin chewable tablet 81 mg, 81 mg, Oral, Daily, Jolly Gamez MD, 81 mg at 04/29/19 0810  •  atorvastatin (LIPITOR) tablet 10 mg, 10 mg, Oral, Daily, Jolly Gamez MD, 10 mg at 04/29/19 0810  •  busPIRone (BUSPAR) tablet 10 mg, 10 mg, Oral, TID, Jolly Gamez MD, 10 mg at 04/29/19 0810  •  CloNIDine (CATAPRES) tablet 0.1 mg, 0.1 mg, Oral, Q4H PRN, Jolly Gamez MD  •  cyclobenzaprine (FLEXERIL) tablet 10 mg, 10 mg, Oral, TID, Jolly Gamez MD, 10 mg at 04/29/19 0810  •  ergocalciferol (DRISDOL) 8000 UNIT/ML drops 2,000 Units, 2,000 Units, Oral, Daily, Jolly Gamez MD, 2,000 Units at 04/29/19 0810  •  folic acid (FOLVITE) tablet 1 mg, 1 mg, Oral, Daily, Jolly Gamez MD, 1 mg at 04/29/19 0810  •  hydrOXYzine pamoate (VISTARIL) capsule 50 mg, 50 mg, Oral, Q6H PRN, Jolly Gamez MD  •  lisinopril (PRINIVIL,ZESTRIL) tablet 10 mg, 10 mg, Oral, Daily, Jolly Gamez MD, 10 mg at 04/29/19 0810  •  loperamide (IMODIUM) capsule 2 mg, 2 mg, Oral, 4x Daily PRN, Jolly Gamez MD  •  magnesium hydroxide (MILK OF MAGNESIA) suspension 2400 mg/10mL 10 mL, 10 mL, Oral, Daily PRN, Jolly Gamez MD  •  melatonin tablet 3 mg, 3 mg, Oral, Nightly, Jolly Gamez MD, 3 mg at 04/28/19 2025  •  nitroglycerin (NITROSTAT) SL tablet 0.4 mg, 0.4 mg, Sublingual, Q5 Min PRN, Jolly Gamez MD  •  pantoprazole (PROTONIX) EC tablet 40 mg, 40 mg, Oral, QAM, Jolly Gamez MD, 40 mg at 04/29/19 0605  •  polyethylene glycol (MIRALAX) powder 17 g, 17 g, Oral, Daily, Jolly Gamez MD, 17 g at 04/29/19 0820  •  risperiDONE (risperDAL) tablet 0.5 mg, 0.5 mg, Oral, Nightly, Mery Cronin APRMATTHEW, 0.5 mg at 04/28/19 2026  •   tamsulosin (FLOMAX) 24 hr capsule 0.4 mg, 0.4 mg, Oral, Nightly, Jolly Gamez MD, 0.4 mg at 04/28/19 2025  •  traZODone (DESYREL) tablet 50 mg, 50 mg, Oral, Nightly PRN, Jolly Gamez MD, 50 mg at 04/28/19 2026  •  venlafaxine XR (EFFEXOR-XR) 24 hr capsule 75 mg, 75 mg, Oral, Daily, Jolly Gamez MD, 75 mg at 04/29/19 0810    Diagnoses/Assessment:     Dementia with behavioral disturbance    Severe episode of recurrent major depressive disorder, with psychotic features (CMS/HCC)    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  2) Will continue to provide treatment with the unit milieu, activities, therapies and psychopharmacological management.  3) Patient to be placed on or continued on  Q15 minute checks  and Suicide precautions.  4) Pertinent medical issues: No active medical concerns.  5) Will order following labs: none  6) Will make the following medication changes: Will increase the risperdal to 1mg qhs.  Will decrease the effexor xr to 37.5mg daily and continue buspar 10mg tid.  Will start zoloft 25mg today.  7) Will continue discharge planning as appropriate for patient.  8) Psychotherapy provided for less than 16 minutes.    Treatment plan and medication risks and benefits discussed with: Patient    Jolly Gamez MD  04/29/19  2:11 PM    Electronically signed by Jolly Gamez MD at 4/29/2019  2:22 PM     Mery Cronin APRN at 4/28/2019 11:44 AM     Attestation signed by Jolly Gamez MD at 4/28/2019  4:35 PM    I saw and examined the patient and was present during the key portion of the E/M service.     Subjective: Patient today continues to be confused.  His confusion appears to be at baseline.  Nursing staff report the patient has been calmer and more cooperative today.  There has not been any  "reports of paranoid or psychotic behavior.     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  Will continue Risperdal 0.5 mg nightly.  We will continue to monitor for paranoia and behavioral issues and psychosis.    Jolly Gamez MD  04/28/19  4:33 PM                    4/28/2019    Chief Complaint: suicidal ideation, dementia related behavioral issues and physical aggression    Subjective:    Mr Sg Vega is a 53 yr old male that is seen on the [facility] for behaviors reported from SNF.   Today, Sg is awake and has had breakfast he is sitting in his wheelchair watching television with peers in the common area.   Sg is unable to tell me the date, where he is or why he is here. 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 He states that he felt like he made a difference in some kids life.   Sg repeats that same stories about his hands/knuckles. He states that his grandmother was [employer] and that is why he has such large knuckles.   He also tells about his father's name is \"Elkin Vega\"   Sg denies any GI distress, he has not have aggression or agitation thus far.     Objective     Vital " "Signs    Temp:  [98.5 °F (36.9 °C)] 98.5 °F (36.9 °C)  Heart Rate:  [84-89] 84  Resp:  [18] 18  BP: (105-111)/(63-64) 111/63    Physical Exam:   General Appearance: pleasant, appears stated age and cooperative,  Hygiene:   fair  Gait & Station: Wheelchair  Musculoskeletal: No tremors or abnormal involuntary movements    Mental Status Exam:   Cooperation:  Cooperative  Eye Contact:  Fair  Psychomotor Behavior:  Appropriate  Mood: \"Fine\"  Affect:  mood-congruent  Speech:  Normal  Thought Process:  Poverty of thought  Associations: Disorganized  Thought Content:     Mood congurent   Suicidal:  None   Homicidal:  None   Hallucinations:  None   Delusion:  None  Cognitive Functioning:   Consciousness: awake and confused  Reliability:  poor  Insight:  Poor  Judgement:  Impaired  Impulse Control:  Impaired    Lab Results (last 24 hours)     ** No results found for the last 24 hours. **        Imaging Results (last 24 hours)     ** No results found for the last 24 hours. **          Medicine:   Current Facility-Administered Medications:   •  acetaminophen (TYLENOL) tablet 650 mg, 650 mg, Oral, Q4H PRN, Jolly Gamez MD  •  aluminum-magnesium hydroxide-simethicone (MAALOX MAX) 400-400-40 MG/5ML suspension 15 mL, 15 mL, Oral, Q6H PRN, Jolly Gamez MD  •  amLODIPine (NORVASC) tablet 10 mg, 10 mg, Oral, Daily, Jolly Gamez MD, 10 mg at 04/28/19 0833  •  aspirin chewable tablet 81 mg, 81 mg, Oral, Daily, Jolly Gamez MD, 81 mg at 04/28/19 0833  •  atorvastatin (LIPITOR) tablet 10 mg, 10 mg, Oral, Daily, Jolly Gamez MD, 10 mg at 04/28/19 0833  •  busPIRone (BUSPAR) tablet 10 mg, 10 mg, Oral, TID, Jolly Gamez MD, 10 mg at 04/28/19 0834  •  CloNIDine (CATAPRES) tablet 0.1 mg, 0.1 mg, Oral, Q4H PRN, Jolly Gamez MD  •  cyclobenzaprine (FLEXERIL) tablet 10 mg, 10 mg, Oral, TID, Jolly Gamez MD, 10 mg at 04/28/19 0897  •  ergocalciferol (DRISDOL) 8000 UNIT/ML drops 2,000 Units, " 2,000 Units, Oral, Daily, Jolly Gamez MD, 2,000 Units at 04/28/19 0834  •  folic acid (FOLVITE) tablet 1 mg, 1 mg, Oral, Daily, Jolly Gamez MD, 1 mg at 04/28/19 0834  •  hydrOXYzine pamoate (VISTARIL) capsule 50 mg, 50 mg, Oral, Q6H PRN, Jolly Gamez MD  •  lisinopril (PRINIVIL,ZESTRIL) tablet 10 mg, 10 mg, Oral, Daily, Jolly Gamez MD, 10 mg at 04/28/19 0834  •  loperamide (IMODIUM) capsule 2 mg, 2 mg, Oral, 4x Daily PRN, Jolly Gamez MD  •  magnesium hydroxide (MILK OF MAGNESIA) suspension 2400 mg/10mL 10 mL, 10 mL, Oral, Daily PRN, Jolly Gamez MD  •  melatonin tablet 3 mg, 3 mg, Oral, Nightly, Jolly Gamez MD, 3 mg at 04/27/19 2027  •  nitroglycerin (NITROSTAT) SL tablet 0.4 mg, 0.4 mg, Sublingual, Q5 Min PRN, Jolly Gamez MD  •  pantoprazole (PROTONIX) EC tablet 40 mg, 40 mg, Oral, QAM, Jolly Gamez MD, 40 mg at 04/28/19 0608  •  polyethylene glycol (MIRALAX) powder 17 g, 17 g, Oral, Daily, Jolly Gamez MD, 17 g at 04/28/19 0834  •  risperiDONE (risperDAL) tablet 0.5 mg, 0.5 mg, Oral, Nightly, Mery Cronin APRN, 0.5 mg at 04/27/19 2027  •  tamsulosin (FLOMAX) 24 hr capsule 0.4 mg, 0.4 mg, Oral, Nightly, Jolly Gamez MD, 0.4 mg at 04/27/19 2027  •  traZODone (DESYREL) tablet 50 mg, 50 mg, Oral, Nightly PRN, Jolly Gamez MD  •  venlafaxine XR (EFFEXOR-XR) 24 hr capsule 75 mg, 75 mg, Oral, Daily, Jolly Gamez MD, 75 mg at 04/28/19 0838    Diagnoses/Assessment:     Dementia with behavioral disturbance    Severe episode of recurrent major depressive disorder, with psychotic features (CMS/HCC)    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  2) Will continue to provide treatment with the unit milieu, activities, therapies and psychopharmacological  "management.  3) Patient to be placed on or continued on  Q15 minute checks  and Suicide and Aggression precautions.  4) Pertinent medical issues: managed by Dr. Topete, he is also working with PT/OT  5) Will order following labs: none  6) Will make the following medication changes:   --Buspar 10mg TID  --Risperdal 0.5mg Nightly   --Effexor-XR  75mg daily  7) Will continue discharge planning as appropriate for patient.  8) Psychotherapy provided for less than 16 minutes.    Treatment plan and medication risks and benefits discussed with: Patient and treatment team    FERNANDO Esteban  04/28/19  11:45 AM    Electronically signed by Jolly Gamez MD at 4/28/2019  4:35 PM     Mery Cronin APRN at 4/27/2019  2:44 PM     Attestation signed by Jolly Gamez MD at 4/27/2019  7:11 PM    I saw and examined the patient and was present during the key portion of the E/M service.    Subjective: Patient today continues to be confused.  His confusion appears to be at baseline.  There has been no note of behavioral issues on the unit thus far by nursing.  Records from the nursing home do indicate the patient was expressing lots of paranoia.  No paranoia has been reported here.  When I saw him today he seemed to be indicating that he was looking for a car.  Not sure if this was just confusion or actual hallucination.    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  Will stop Seroquel and start Risperdal 0.5 mg nightly.  We will continue to monitor for paranoia and behavioral issues and psychosis.    Jolly Gamez MD  04/27/19  7:07 PM                     4/27/2019    Chief Complaint: Dementia with Behaviors, SI, physical aggression    Subjective:    Mr. Sg Vega is a 53 yr old male that is inpatient on the [facility].   Today he was seen while eating breakfast where he was interacting with peers and being appropriate.  Mr. Vega is more quiet at this time than he was yesterday. He has not been making any inappropriate remarks.   He does tell a peer that is yelling and threatening (she is chair bound), \"you want me to show you my knuckles\"  He is joking with her.    He is able to work with therapy today.   He repeats his stories. He is smiling at others.   When asked if he slept ok he states \"that's what they told me\"    He is compliant with his medications.  He is confused in orientation.   He does not report any adverse effects with his medications.     Objective     Vital Signs    Temp:  [96.4 °F (35.8 °C)-97.8 °F (36.6 °C)] 97.8 °F (36.6 °C)  Heart Rate:  [70-94] 70  Resp:  [18] 18  BP: (118-119)/(77-84) 119/77    Physical Exam:   General Appearance: alert and appropriate  Hygiene:   Fair with assistance  Gait & Station: wheelchair  Musculoskeletal: no tremors     Mental Status Exam:   Cooperation:  cooperative  Eye Contact:  good  Psychomotor Behavior:  slow  Mood: fine  Affect:  Mood congruent  Speech:  Slurred but understandable  Thought Process:  Poverty of thought  Associations: disorganized  Thought Content:     normal   Suicidal:  none   Homicidal:  none   Hallucinations:  none   Delusion:  none  Cognitive Functioning: confused, calm   Reliability:  impaired  Insight:  impaired  Judgement:  impaired  Impulse Control:  impaired    Lab Results (last 24 hours)     ** No results found for the last " 24 hours. **        Imaging Results (last 24 hours)     ** No results found for the last 24 hours. **          Medicine:   Current Facility-Administered Medications:   •  acetaminophen (TYLENOL) tablet 650 mg, 650 mg, Oral, Q4H PRN, Jolly Gamez MD  •  aluminum-magnesium hydroxide-simethicone (MAALOX MAX) 400-400-40 MG/5ML suspension 15 mL, 15 mL, Oral, Q6H PRN, Jolly Gamez MD  •  amLODIPine (NORVASC) tablet 10 mg, 10 mg, Oral, Daily, Jolly Gamez MD, 10 mg at 04/27/19 0838  •  aspirin chewable tablet 81 mg, 81 mg, Oral, Daily, Jolly Gamez MD, 81 mg at 04/27/19 0839  •  atorvastatin (LIPITOR) tablet 10 mg, 10 mg, Oral, Daily, Jolly Gamez MD, 10 mg at 04/27/19 0839  •  busPIRone (BUSPAR) tablet 10 mg, 10 mg, Oral, TID, Jolly Gamez MD, 10 mg at 04/27/19 0839  •  CloNIDine (CATAPRES) tablet 0.1 mg, 0.1 mg, Oral, Q4H PRN, Jolly Gamez MD  •  cyclobenzaprine (FLEXERIL) tablet 10 mg, 10 mg, Oral, TID, Jolly Gamez MD, 10 mg at 04/27/19 0844  •  ergocalciferol (DRISDOL) 8000 UNIT/ML drops 2,000 Units, 2,000 Units, Oral, Daily, Jolly Gamez MD, 2,000 Units at 04/27/19 0838  •  folic acid (FOLVITE) tablet 1 mg, 1 mg, Oral, Daily, Jolly Gamez MD, 1 mg at 04/27/19 0839  •  hydrOXYzine pamoate (VISTARIL) capsule 50 mg, 50 mg, Oral, Q6H PRN, Jolly Gamez MD  •  lisinopril (PRINIVIL,ZESTRIL) tablet 10 mg, 10 mg, Oral, Daily, Jolly Gamez MD, 10 mg at 04/27/19 0839  •  loperamide (IMODIUM) capsule 2 mg, 2 mg, Oral, 4x Daily PRN, Jolly Gamez MD  •  magnesium hydroxide (MILK OF MAGNESIA) suspension 2400 mg/10mL 10 mL, 10 mL, Oral, Daily PRN, Jolly Gamez MD  •  melatonin tablet 3 mg, 3 mg, Oral, Nightly, Jolly Gamez MD, 3 mg at 04/26/19 2042  •  nitroglycerin (NITROSTAT) SL tablet 0.4 mg, 0.4 mg, Sublingual, Q5 Min PRN, Jolly Gamez MD  •  pantoprazole (PROTONIX) EC tablet 40 mg, 40 mg, Oral, QAMFranco,  MD Jolly, 40 mg at 04/27/19 0617  •  polyethylene glycol (MIRALAX) powder 17 g, 17 g, Oral, Daily, Jolly Gamez MD, 17 g at 04/27/19 0839  •  risperiDONE (risperDAL) tablet 0.5 mg, 0.5 mg, Oral, Nightly, Mery Cronin APRN  •  tamsulosin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  -TW    Moving from lying on back to sitting on the side of a flat bed without bedrails?  --  --  3  -TW    Moving to and from a bed to a chair (including a wheelchair)?  --  --  2  -TW    Standing up from a chair using your arms (e.g., wheelchair, bedside chair)?  --  --  2  -TW    Climbing 3-5 steps with a railing?  --  --  2  -TW    To walk in hospital room?  --  --  2  -TW    AM-PAC 6 Clicks Score  --  --  14  -TW       How much help from another is currently needed...    Putting on and taking off regular lower body clothing?  3  -BB  3  -LW  --    Bathing (including washing, rinsing, and drying)  3  -BB  3  -LW  --    Toileting (which includes using toilet bed pan or urinal)  3  -BB  3  -LW  --    Putting on and taking off regular upper body clothing   3  -BB  3  -LW  --    Taking care of personal grooming (such as brushing teeth)  3  -BB  3  -LW  --    Eating meals  3  -BB  3  -LW  --    Score  18  -BB  18  -LW  --       Functional Assessment    Outcome Measure Options  --  --  AM-PAC 6 Clicks Basic Mobility (PT)  -TW      User Key  (r) = Recorded By, (t) = Taken By, (c) = Cosigned By    Initials Name Provider Type    Candelario Feliciano, KAL Physical Therapy Assistant    Kaur Delgado, KAL Physical Therapy Assistant    TW Suleman Kohli, PTA Physical Therapy Assistant    BB Netta Robles COTA/L Occupational Therapy Assistant    Darcie Muse COTA/L Occupational Therapy Assistant         Time Calculation:   PT Charges     Row Name 19 1513             Time Calculation    Start Time  1337  -TA      Stop Time  1350  -TA      Time Calculation (min)  13 min  -TA         Time Calculation- PT    Total Timed Code Minutes- PT  13 minute(s)  -TA        User Key  (r) = Recorded By, (t) = Taken By, (c) = Cosigned By    Initials Name Provider Type    Kaur Delgado PTA Physical Therapy Assistant        Therapy Charges for Today     Code Description Service Date Service Provider Modifiers Qty    84908582005 HC PT THERAPEUTIC ACT EA 15 MIN 2019 Kaur Mitchell PTA GP 1          PT G-Codes  Outcome Measure Options: AM-PAC 6 Clicks Basic Mobility (PT)  AM-PAC 6 Clicks Score: 14  Score: 18    Kaur Mitchell PTA  2019         Electronically signed by Kaur Mitchell PTA at 2019  3:14 PM          Occupational Therapy Notes (most recent note)      Netta Robles COTA/L at 2019  2:02 PM          Acute Care - Occupational Therapy Treatment Note  [facility]     Patient Name: Sg Vega  : 1965  MRN: [id number]  Today's Date: 2019  Onset of Illness/Injury or Date of Surgery: 19  Date of Referral to OT: 19  Referring Physician: FERNANDO Gutierrez    Admit Date: 2019       ICD-10-CM  ICD-9-CM   1. Oral phase dysphagia R13.11 787.21   2. Impaired functional mobility, balance, and endurance Z74.09 V49.89   3. 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 No pertinent surgical history.    Therapy Treatment    Rehabilitation Treatment Summary     Row Name 05/06/19 1125             Treatment Time/Intention    Discipline  occupational therapy assistant  -BB      Document Type  therapy note (daily note)  -BB      Subjective Information  no complaints  -BB      Mode of Treatment  individual therapy;occupational therapy  -BB      Patient/Family Observations  Pt in W/C sitting at table  -BB      Total Minutes, Occupational Therapy Treatment  25  -BB      Therapy Frequency (OT Eval)  other (see comments) 5-7 days/wk  -BB      Patient Effort  adequate  -BB      Existing Precautions/Restrictions  fall;other (see comments) behavioral aggression, paranoria  -BB      Recorded by [BB] Netta Robles COTA/L 05/06/19 1400      Row Name 05/06/19 1125             Cognitive Assessment/Intervention- PT/OT    Affect/Mental Status (Cognitive)  flat/blunted affect  -BB      Orientation Status (Cognition)  oriented to;person  -BB      Follows Commands (Cognition)  follows one step commands  -BB      Personal Safety  Interventions  fall prevention program maintained;gait belt;muscle strengthening facilitated;nonskid shoes/slippers when out of bed  -BB      Recorded by [BB] Netta Robles COTA/L 05/06/19 1400      Row Name 05/06/19 1125             Therapeutic Exercise    Upper Extremity Range of Motion (Therapeutic Exercise)  shoulder flexion/extension, bilateral;shoulder internal/external rotation, bilateral;elbow flexion/extension, bilateral;forearm supination/pronation, bilateral;wrist flexion/extension, bilateral L shoulder ROM limited  -BB      Hand (Therapeutic Exercise)  finger flexion/extension, bilateral  -BB      Exercise Type (Therapeutic Exercise)  AROM (active range of motion)  -BB      Position (Therapeutic Exercise)  seated  -BB      Sets/Reps (Therapeutic Exercise)  1x15  -BB      Expected Outcome (Therapeutic Exercise)  improve functional stability;improve functional tolerance, self-care activity;improve performance, transfer skills  -BB      Recorded by [BB] Netta Robles COTA/L 05/06/19 1400      Row Name 05/06/19 1125             Positioning and Restraints    Pre-Treatment Position  sitting in chair/recliner  -BB      Post Treatment Position  wheelchair  -BB      In Bed  sitting;call light within reach;encouraged to call for assist;exit alarm on  -BB      Recorded by [BB] Netta Robles COTA/L 05/06/19 1400      Row Name 05/06/19 1125             Pain Scale: Numbers Pre/Post-Treatment    Pain Scale: Numbers, Pretreatment  0/10 - no pain  -BB      Pain Scale: Numbers, Post-Treatment  0/10 - no pain  -BB      Recorded by [BB] Netta Robles COTA/L 05/06/19 1400      Row Name 05/06/19 1125             Outcome Summary/Treatment Plan (OT)    Daily Summary of Progress (OT)  progress toward functional goals is gradual  -BB      Plan for Continued Treatment (OT)  continue POC  -BB      Anticipated Discharge Disposition (OT)  skilled nursing facility  -BB      Recorded by [BB] Travis  DANIEL Gee 05/06/19 1400        User Key  (r) = Recorded By, (t) = Taken By, (c) = Cosigned By    Initials Name Effective Dates Discipline    BB Jenelle Roblesly J, RESENDEZ/L 03/07/18 -  OT           Rehab Goal Summary     Row Name 05/06/19 1125             Occupational Therapy Goals    Transfer Goal Selection (OT)  transfer, OT goal 1  -BB      Toileting Goal Selection (OT)  toileting, OT goal 1  -BB      Self-Feeding Goal Selection (OT)  self feeding, OT goal 1  -BB      Strength Goal Selection (OT)  strength, OT goal 1  -BB      Coordination Goal Selection (OT)  coordination, OT goal 1  -BB         Transfer Goal 1 (OT)    Activity/Assistive Device (Transfer Goal 1, OT)  sit-to-stand/stand-to-sit;bed-to-chair/chair-to-bed;toilet;walker, rolling  -BB      [place] Level/Cues Needed (Transfer Goal 1, OT)  contact guard assist  -BB      Time Frame (Transfer Goal 1, OT)  long term goal (LTG);by discharge  -BB      Progress/Outcome (Transfer Goal 1, OT)  goal not met  -BB         Toileting Goal 1 (OT)    Activity/Device (Toileting Goal 1, OT)  toileting skills, all  -BB      [place] Level/Cues Needed (Toileting Goal 1, OT)  contact guard assist  -BB      Time Frame (Toileting Goal 1, OT)  long term goal (LTG);by discharge  -BB      Progress/Outcome (Toileting Goal 1, OT)  goal not met  -BB         Self-Feeding Goal 1 (OT)    Activity/Assistive Device (Self-Feeding Goal 1, OT)  self-feeding skills, all  -BB      [place] Level/Cues Needed (Self-Feeding Goal 1, OT)  set-up required;supervision required  -BB      Time Frame (Self-Feeding Goal 1, OT)  long term goal (LTG);by discharge  -BB      Progress/Outcomes (Self-Feeding Goal 1, OT)  goal not met  -BB         Strength Goal 1 (OT)    Strength Goal 1 (OT)  Pt will complete BUE AROM exercises in all planes to increase strength by 1/2 grade to increase functional independence with ADLs.   -BB      Time Frame (Strength Goal 1, OT)  long term goal (LTG);by  discharge  -BB      Progress/Outcome (Strength Goal 1, OT)  goal not met  -BB         Coordination Goal 1 (OT)    Activity/Assistive Device (Coordination Goal 1, OT)  GM task;FM task  -BB      [place] Level/Cues Needed (Coordination Goal 1, OT)  minimum assist (75% or more patient effort);verbal cues required  -BB      Time Frame (Coordination Goal 1, OT)  long term goal (LTG);by discharge  -BB      Progress/Outcomes (Coordination Goal 1, OT)  goal not met  -BB        User Key  (r) = Recorded By, (t) = Taken By, (c) = Cosigned By    Initials Name Provider Type Discipline    Netta Chilel COTA/L Occupational Therapy Assistant OT        Occupational Therapy Education     Title: PT OT SLP Therapies (In Progress)     Topic: Occupational Therapy (In Progress)     Point: ADL training (In Progress)     Description: Instruct learner(s) on proper safety adaptation and remediation techniques during self care or transfers.   Instruct in proper use of assistive devices.    Learning Progress Summary           Patient Acceptance, E, NR by DEE at 5/5/2019 11:54 AM    Acceptance, E,TB, VU by CLINT at 5/4/2019  1:36 PM    Acceptance, E, VU by DEE at 4/29/2019  2:52 PM    Acceptance, E,TB, VU by  at 4/28/2019  4:30 PM    Comment:  Role of OT and POC. Benefit of activity                   Point: Home exercise program (Done)     Description: Instruct learner(s) on appropriate technique for monitoring, assisting and/or progressing therapeutic exercises/activities.    Learning Progress Summary           Patient Acceptance, E,TB, VU by LW at 5/4/2019  1:36 PM                   Point: Precautions (Done)     Description: Instruct learner(s) on prescribed precautions during self-care and functional transfers.    Learning Progress Summary           Patient Acceptance, E,TB, VU by LW at 5/4/2019  1:36 PM    Acceptance, E,TB, VU by MR at 4/28/2019  4:30 PM    Comment:  Role of OT and POC. Benefit of activity                   Point:  Body mechanics (Done)     Description: Instruct learner(s) on proper positioning and spine alignment during self-care, functional mobility activities and/or exercises.    Learning Progress Summary           Patient Acceptance, E,TB, VU by LW at 5/4/2019  1:36 PM    Acceptance, E,TB, VU by MR at 4/28/2019  4:30 PM    Comment:  Role of OT and POC. Benefit of activity                               User Key     Initials Effective Dates Name Provider Type Discipline    BB 03/07/18 -  Netta Robles COTA/L Occupational Therapy Assistant OT    LW 03/07/18 -  Darcie Guaman COTA/L Occupational Therapy Assistant OT    MR 04/03/18 -  Romina Marshall, OT Occupational Therapist OT                OT Recommendation and Plan  Outcome Summary/Treatment Plan (OT)  Daily Summary of Progress (OT): progress toward functional goals is gradual  Plan for Continued Treatment (OT): continue POC  Anticipated Discharge Disposition (OT): skilled nursing facility  Therapy Frequency (OT Eval): other (see comments)(5-7 days/wk)  Daily Summary of Progress (OT): progress toward functional goals is gradual  Plan of Care Review  Plan of Care Reviewed With: patient  Plan of Care Reviewed With: patient  Outcome Summary: Pt performed B UE exercises with increased time and RBs. No goals met this tx.  Outcome Measures     Row Name 05/06/19 1125 05/05/19 1000 05/05/19 0805       How much help from another person do you currently need...    Turning from your back to your side while in flat bed without using bedrails?  --  3  -JA  --    Moving from lying on back to sitting on the side of a flat bed without bedrails?  --  3  -JA  --    Moving to and from a bed to a chair (including a wheelchair)?  --  2  -JA  --    Standing up from a chair using your arms (e.g., wheelchair, bedside chair)?  --  2  -JA  --    Climbing 3-5 steps with a railing?  --  2  -JA  --    To walk in hospital room?  --  2  -JA  --    AM-PAC 6 Clicks Score  --  14  -JA  --        How much help from another is currently needed...    Putting on and taking off regular lower body clothing?  3  -BB  --  3  -BB    Bathing (including washing, rinsing, and drying)  3  -BB  --  3  -BB    Toileting (which includes using toilet bed pan or urinal)  3  -BB  --  3  -BB    Putting on and taking off regular upper body clothing  3  -BB  --  3  -BB    Taking care of personal grooming (such as brushing teeth)  3  -BB  --  3  -BB    Eating meals  3  -BB  --  3  -BB    Score  18  -BB  --  18  -BB       Functional Assessment    Outcome Measure Options  --  AM-PAC 6 Clicks Basic Mobility (PT)  -DENICE  --    Row Name 05/04/19 1015 05/03/19 8790          How much help from another person do you currently need...    Turning from your back to your side while in flat bed without using bedrails?  --  3  -TW     Moving from lying on back to sitting on the side of a flat bed without bedrails?  --  3  -TW     Moving to and from a bed to a chair (including a wheelchair)?  --  2  -TW     Standing up from a chair using your arms (e.g., wheelchair, bedside chair)?  --  2  -TW     Climbing 3-5 steps with a railing?  --  2  -TW     To walk in hospital room?  --  2  -TW     AM-PAC 6 Clicks Score  --  14  -TW        How much help from another is currently needed...    Putting on and taking off regular lower body clothing?  3  -LW  --     Bathing (including washing, rinsing, and drying)  3  -LW  --     Toileting (which includes using toilet bed pan or urinal)  3  -LW  --     Putting on and taking off regular upper body clothing  3  -LW  --     Taking care of personal grooming (such as brushing teeth)  3  -LW  --     Eating meals  3  -LW  --     Score  18  -LW  --        Functional Assessment    Outcome Measure Options  --  AM-PAC 6 Clicks Basic Mobility (PT)  -TW       User Key  (r) = Recorded By, (t) = Taken By, (c) = Cosigned By    Initials Name Provider Type    Candelario Feliciano PTA Physical Therapy Assistant    TW  Suleman Kohli, PTA Physical Therapy Assistant    BB Netta Robles COTA/L Occupational Therapy Assistant    Darcie Muse COTA/L Occupational Therapy Assistant           Time Calculation:   Time Calculation- OT     Row Name 05/06/19 1402             Time Calculation- OT    OT Start Time  1125  -BB      OT Stop Time  1150  -BB      OT Time Calculation (min)  25 min  -BB      Total Timed Code Minutes- OT  25 minute(s)  -BB      OT Received On  05/06/19  -        User Key  (r) = Recorded By, (t) = Taken By, (c) = Cosigned By    Initials Name Provider Type    Netta Chilel COTA/L Occupational Therapy Assistant        Therapy Charges for Today     Code Description Service Date Service Provider Modifiers Qty    66859254626 HC OT SELF CARE/MGMT/TRAIN EA 15 MIN 5/5/2019 Netta Robles COTA/L GO 3    52151337688 HC OT THER PROC EA 15 MIN 5/6/2019 Netta Robles COTA/L GO 2            Non-skid socks and gait belt in place. Toileting offered. Call light and needs within reach. Pt advised to not get up alone and call the nurse for assistance.  Tag alarm on.       DANIEL Morales  5/6/2019    Electronically signed by Netta Robles COTA/L at 5/6/2019  2:03 PM

## 2019-05-07 NOTE — NURSING NOTE
"Behavior   Note any precipitants to event or behavior   Describe level and action of any aggressive behavior or speech and associated interventions.     Anxiety: Restless/Edgy  Depression: Patient denies at this time  Pain  0  AVH   no  S/I   no  Plan  no  H/I   no  Plan  no    Affect   flat      Note: Patient in common area in wheelchair. Patient is irritable this evening and keeps asking for an RC. Multiple attempts to explain to patient [facility] policy on caffeine outside of meal times. Patient states \"you gave me one and now you're going to take it away. I guess I'll take you off my paul list.\"  He took all medications as ordered. Encouraged open communication with staff. No S/S of distress. Will continue to monitor.       Intervention    PRN medication utilized:  yes - Trazodone for sleep    Instructed in medication usage and effects  Medications administered as ordered  Encouraged to verbalize needs      Response    Verbalized understanding   Did patient take medications as ordered yes   Did patient interact with assessment?  yes     Plan    Will monitor for safety  Will monitor every 15 minutes as ordered  Will evaluate and promote the plan of care    Last BM:  unknown date  (Please chart in I/O as well)    "

## 2019-05-07 NOTE — THERAPY TREATMENT NOTE
Acute Care - Occupational Therapy Treatment Note  [facility]     Patient Name: Sg Vega  : 1965  MRN: [id number]  Today's Date: 2019  Onset of Illness/Injury or Date of Surgery: 19  Date of Referral to OT: 19  Referring Physician: EFRNANDO Gutierrez    Admit Date: 2019       ICD-10-CM ICD-9-CM   1. Oral phase dysphagia R13.11 787.21   2. Impaired functional mobility, balance, and endurance Z74.09 V49.89   3. 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 No pertinent surgical history.    Therapy Treatment    Rehabilitation Treatment Summary     Row Name 19 1110             Treatment Time/Intention    Discipline  occupational therapist  -MR      Patient/Family Observations  sitting in w/c in community area   -MR      Care Plan Review  care plan/treatment goals reviewed;risks/benefits reviewed;current/potential barriers reviewed;evaluation/treatment results reviewed  -MR      Total Minutes, Occupational Therapy Treatment  18  -MR      Therapy Frequency (OT Eval)  other (see comments) 5-7 days/wk  -MR      Existing Precautions/Restrictions  fall  -MR      Recorded by [MR] Romina Marshall, OT 19 1528      Row Name 19 1110             Cognitive  Assessment/Intervention    Additional Documentation  Cognitive Assessment/Intervention (Group)  -MR      Recorded by [MR] Romina Marshall, OT 05/07/19 1528      Row Name 05/07/19 1110             Cognitive Assessment/Intervention- PT/OT    Affect/Mental Status (Cognitive)  confused  -MR      Orientation Status (Cognition)  oriented to;person  -MR      Follows Commands (Cognition)  follows one step commands;75-90% accuracy;physical/tactile prompts required;repetition of directions required;verbal cues/prompting required  -MR      Cognitive Function (Cognitive)  safety deficit  -MR      Safety Deficit (Cognitive)  moderate deficit  -MR      Personal Safety Interventions  fall prevention program maintained;muscle strengthening facilitated;nonskid shoes/slippers when out of bed;supervised activity  -MR      Recorded by [MR] Romina Marshall, OT 05/07/19 1528      Row Name 05/07/19 1110             Safety Issues, Functional Mobility    Safety Issues Affecting Function (Mobility)  insight into deficits/self awareness  -MR      Impairments Affecting Function (Mobility)  balance;cognition;coordination;endurance/activity tolerance;motor control;motor planning;strength  -MR      Comment, Safety Issues/Impairments (Mobility)  Pt completed functional mobility via w/c mobility with SBA  ~ 100ft  -MR      Recorded by [MR] Romina Marshall, OT 05/07/19 1528      Row Name 05/07/19 1110             Bed Mobility Assessment/Treatment    Comment (Bed Mobility)  NT  -MR      Recorded by [MR] Romina Marshall, OT 05/07/19 1528      Row Name 05/07/19 1110             Functional Mobility    Functional Mobility- Comment  Pt completed functional mobility via w/c mobility with SBA  ~ 100ft  -MR      Recorded by [MR] Romina Marshall, OT 05/07/19 1528      Row Name 05/07/19 1110             Transfer Assessment/Treatment    Comment (Transfers)  Pt deferred transfer attempts  -MR      Recorded by [MR] Romina Marshall, OT 05/07/19  1528      Row Name 05/07/19 1110             ADL Assessment/Intervention    BADL Assessment/Intervention  lower body dressing  -MR      Recorded by [MR] Rolando Romina STEVENS, OT 05/07/19 1528      Row Name 05/07/19 1110             Lower Body Dressing Assessment/Training    Lower Body Dressing [place] Level  doff;don;shoes/slippers;supervision;verbal cues  -MR      Lower Body Dressing Position  supported sitting  -MR      Recorded by [MR] Rolando Romina STEVENS, OT 05/07/19 1528      Row Name 05/07/19 1110             BADL Safety/Performance    Impairments, BADL Safety/Performance  balance;cognition;endurance/activity tolerance;coordination;range of motion;strength  -MR      Recorded by [MR] Rolando Romina E, OT 05/07/19 1528      Row Name 05/07/19 1110             General ROM    GENERAL ROM COMMENTS  BUE AROM grossly WFL  -MR      Recorded by [MR] Rolando Romina RODNEY, OT 05/07/19 1528      Row Name 05/07/19 1110             MMT (Manual Muscle Testing)    General MMT Comments  BUE grossly 4-/5  -MR      Recorded by [MR] Rolando Romina E, OT 05/07/19 1528      Row Name 05/07/19 1110             Therapeutic Exercise    Comment (Therapeutic Exercise)  BUE gross coordination x 10 reps required pt to reach outside of base of support  -MR      Recorded by [MR] Rolando Romina RODNEY, OT 05/07/19 1528      Row Name 05/07/19 1110             Fine Motor Testing & Training    Training Activity, Fine Motor Coordination  other (see comments) 6-pack exercises  -MR      Recorded by [MR] Rolando Romina E, OT 05/07/19 1528      Row Name 05/07/19 1110             Positioning and Restraints    Pre-Treatment Position  sitting in chair/recliner  -MR      Post Treatment Position  wheelchair  -MR      In Wheelchair  notified nsg;sitting;with other staff  -MR      Recorded by [MR] Rolando Romina E, OT 05/07/19 1528      Row Name 05/07/19 1110             Pain Scale: Numbers Pre/Post-Treatment    Pain Scale: Numbers, Pretreatment  0/10 -  no pain  -MR      Pain Scale: Numbers, Post-Treatment  0/10 - no pain  -MR      Recorded by [MR] Romina Marshall, OT 05/07/19 1528      Row Name 05/07/19 1110             Sensory Assessment/Intervention    Sensory General Assessment  no sensation deficits identified BUE intact  -MR      Recorded by [MR] Romina Marshall, OT 05/07/19 1528      Row Name 05/07/19 1110             Plan of Care Review    Plan of Care Reviewed With  patient  -MR      Recorded by [MR] Romina Marshall, OT 05/07/19 1528      Row Name 05/07/19 1110             Outcome Summary/Treatment Plan (OT)    Daily Summary of Progress (OT)  progress toward functional goals as expected  -MR      Plan for Continued Treatment (OT)  continue POC  -MR      Anticipated Discharge Disposition (OT)  skilled nursing facility  -MR      Recorded by [MR] Romina Marshall, OT 05/07/19 1528        User Key  (r) = Recorded By, (t) = Taken By, (c) = Cosigned By    Initials Name Effective Dates Discipline    Romina Ramachandran, OT 04/03/18 -  OT           Rehab Goal Summary     Row Name 05/07/19 1110             Occupational Therapy Goals    Transfer Goal Selection (OT)  transfer, OT goal 1  -MR      Toileting Goal Selection (OT)  toileting, OT goal 1  -MR      Self-Feeding Goal Selection (OT)  self feeding, OT goal 1  -MR      Strength Goal Selection (OT)  strength, OT goal 1  -MR      Coordination Goal Selection (OT)  coordination, OT goal 1  -MR         Transfer Goal 1 (OT)    Activity/Assistive Device (Transfer Goal 1, OT)  sit-to-stand/stand-to-sit;bed-to-chair/chair-to-bed;toilet;walker, rolling  -MR      [place] Level/Cues Needed (Transfer Goal 1, OT)  contact guard assist  -MR      Time Frame (Transfer Goal 1, OT)  long term goal (LTG);by discharge  -MR      Progress/Outcome (Transfer Goal 1, OT)  goal not met  -MR         Toileting Goal 1 (OT)    Activity/Device (Toileting Goal 1, OT)  toileting skills, all  -MR      [place] Level/Cues  Needed (Toileting Goal 1, OT)  contact guard assist  -MR      Time Frame (Toileting Goal 1, OT)  long term goal (LTG);by discharge  -MR      Progress/Outcome (Toileting Goal 1, OT)  goal not met  -MR         Self-Feeding Goal 1 (OT)    Activity/Assistive Device (Self-Feeding Goal 1, OT)  self-feeding skills, all  -MR      [place] Level/Cues Needed (Self-Feeding Goal 1, OT)  set-up required;supervision required  -MR      Time Frame (Self-Feeding Goal 1, OT)  long term goal (LTG);by discharge  -MR      Progress/Outcomes (Self-Feeding Goal 1, OT)  goal not met  -MR         Strength Goal 1 (OT)    Strength Goal 1 (OT)  Pt will complete BUE AROM exercises in all planes to increase strength by 1/2 grade to increase functional independence with ADLs.   -MR      Time Frame (Strength Goal 1, OT)  long term goal (LTG);by discharge  -MR      Progress/Outcome (Strength Goal 1, OT)  goal met  (Significant)   -MR         Coordination Goal 1 (OT)    Activity/Assistive Device (Coordination Goal 1, OT)  GM task;FM task  -MR      [place] Level/Cues Needed (Coordination Goal 1, OT)  minimum assist (75% or more patient effort);verbal cues required  -MR      Time Frame (Coordination Goal 1, OT)  long term goal (LTG);by discharge  -MR      Progress/Outcomes (Coordination Goal 1, OT)  goal met  (Significant)   -MR        User Key  (r) = Recorded By, (t) = Taken By, (c) = Cosigned By    Initials Name Provider Type Discipline    Romina Ramachandran, OT Occupational Therapist OT        Occupational Therapy Education     Title: PT OT SLP Therapies (Resolved)     Topic: Occupational Therapy (Resolved)     Point: ADL training (Resolved)     Description: Instruct learner(s) on proper safety adaptation and remediation techniques during self care or transfers.   Instruct in proper use of assistive devices.    Learning Progress Summary           Patient Acceptance, E, NR by DEE at 5/5/2019 11:54 AM    Acceptance, E,TB, VU by CLINT at  5/4/2019  1:36 PM    Acceptance, E, VU by BB at 4/29/2019  2:52 PM    Acceptance, E,TB, VU by MR at 4/28/2019  4:30 PM    Comment:  Role of OT and POC. Benefit of activity                   Point: Home exercise program (Resolved)     Description: Instruct learner(s) on appropriate technique for monitoring, assisting and/or progressing therapeutic exercises/activities.    Learning Progress Summary           Patient Acceptance, E,TB, VU by LW at 5/4/2019  1:36 PM                   Point: Precautions (Resolved)     Description: Instruct learner(s) on prescribed precautions during self-care and functional transfers.    Learning Progress Summary           Patient Acceptance, E,TB, VU by LW at 5/4/2019  1:36 PM    Acceptance, E,TB, VU by MR at 4/28/2019  4:30 PM    Comment:  Role of OT and POC. Benefit of activity                   Point: Body mechanics (Resolved)     Description: Instruct learner(s) on proper positioning and spine alignment during self-care, functional mobility activities and/or exercises.    Learning Progress Summary           Patient Acceptance, E,TB, VU by LW at 5/4/2019  1:36 PM    Acceptance, E,TB, VU by MR at 4/28/2019  4:30 PM    Comment:  Role of OT and POC. Benefit of activity                               User Key     Initials Effective Dates Name Provider Type Discipline     03/07/18 -  Netta Robles COTA/L Occupational Therapy Assistant OT     03/07/18 -  Darcie Guaman COTA/L Occupational Therapy Assistant OT    MR 04/03/18 -  Romina Marshall OT Occupational Therapist OT                OT Recommendation and Plan  Outcome Summary/Treatment Plan (OT)  Daily Summary of Progress (OT): progress toward functional goals as expected  Plan for Continued Treatment (OT): continue POC  Anticipated Discharge Disposition (OT): skilled nursing facility  Planned Therapy Interventions (OT Eval): activity tolerance training, adaptive equipment training, BADL retraining, functional balance  retraining, IADL retraining, occupation/activity based interventions, patient/caregiver education/training, ROM/therapeutic exercise, strengthening exercise, transfer/mobility retraining  Therapy Frequency (OT Eval): other (see comments)(5-7 days/wk)  Daily Summary of Progress (OT): progress toward functional goals as expected  Plan of Care Review  Plan of Care Reviewed With: patient  Plan of Care Reviewed With: patient  Outcome Summary: Pt met 2 OT goals, pt worked well during OT session this date. Pt completed various BUE AROM coordination tasks with minimal VC and RB. Pt demonstrated improved strength during session this date. Continue OT POC.   Outcome Measures     Row Name 05/06/19 1500 05/06/19 1125 05/05/19 1000       How much help from another person do you currently need...    Turning from your back to your side while in flat bed without using bedrails?  3  -TA  --  3  -JA    Moving from lying on back to sitting on the side of a flat bed without bedrails?  3  -TA  --  3  -JA    Moving to and from a bed to a chair (including a wheelchair)?  2  -TA  --  2  -JA    Standing up from a chair using your arms (e.g., wheelchair, bedside chair)?  2  -TA  --  2  -JA    Climbing 3-5 steps with a railing?  2  -TA  --  2  -JA    To walk in hospital room?  2  -TA  --  2  -JA    AM-PAC 6 Clicks Score  14  -TA  --  14  -JA       How much help from another is currently needed...    Putting on and taking off regular lower body clothing?  --  3  -BB  --    Bathing (including washing, rinsing, and drying)  --  3  -BB  --    Toileting (which includes using toilet bed pan or urinal)  --  3  -BB  --    Putting on and taking off regular upper body clothing  --  3  -BB  --    Taking care of personal grooming (such as brushing teeth)  --  3  -BB  --    Eating meals  --  3  -BB  --    Score  --  18  -BB  --       Functional Assessment    Outcome Measure Options  AM-PAC 6 Clicks Basic Mobility (PT)  -TA  --  AM-PAC 6 Clicks Basic  Mobility (PT)  -DENICE    Row Name 05/05/19 0805             How much help from another is currently needed...    Putting on and taking off regular lower body clothing?  3  -BB      Bathing (including washing, rinsing, and drying)  3  -BB      Toileting (which includes using toilet bed pan or urinal)  3  -BB      Putting on and taking off regular upper body clothing  3  -BB      Taking care of personal grooming (such as brushing teeth)  3  -BB      Eating meals  3  -BB      Score  18  -BB        User Key  (r) = Recorded By, (t) = Taken By, (c) = Cosigned By    Initials Name Provider Type    Candelario Feliciano, PTA Physical Therapy Assistant    TA Kaur Mitchell, PTA Physical Therapy Assistant    BB Netta Robles, DIPTI/L Occupational Therapy Assistant           Time Calculation:   Time Calculation- OT     Row Name 05/07/19 1536             Time Calculation- OT    OT Start Time  1110  -MR      OT Stop Time  1128  -MR      OT Time Calculation (min)  18 min  -MR      OT Received On  05/07/19  -        User Key  (r) = Recorded By, (t) = Taken By, (c) = Cosigned By    Initials Name Provider Type     Romina Marshall OT Occupational Therapist        Therapy Charges for Today     Code Description Service Date Service Provider Modifiers Qty    [id number]  OT THERAPEUTIC ACT EA 15 MIN 5/7/2019 Romina Marshall OT GO 1               Romina Marshall OT  5/7/2019

## 2019-05-07 NOTE — PLAN OF CARE
Problem: Patient Care Overview  Goal: Plan of Care Review  Outcome: Ongoing (interventions implemented as appropriate)   05/07/19 7217   Coping/Psychosocial   Plan of Care Reviewed With patient   OTHER   Outcome Summary Pt met 2 OT goals, pt worked well during OT session this date. Pt completed various BUE AROM coordination tasks with minimal VC and RB. Pt demonstrated improved strength during session this date. Continue OT POC.

## 2019-05-07 NOTE — PLAN OF CARE
Problem: Overarching Goals (Adult)  Goal: Adheres to Safety Considerations for Self and Others    Intervention: Develop and Maintain Individualized Safety Plan  CSW has presented in day room dressed appropriately. Mood was calm, affect was bright. Pt. still seems to be somewhat confused and is not oriented to time and place, was stating that he needs to get a bicycle to ride, pt does not have good insight. Pt seems to be at baseline, did not express SI/HI. Pt was explained that he is getting discharged today and PACS will be coming around 1:00 pm and he will be going back to [facility]. In terms of goal pt stated that he will be working on feeling better. Pt has let the NH know about the dc, NH is concerned about the pt going back to the NH by PACS by himself as usually they have someone accompany him when he uses PACS. Their concern is what if pt get sick or acts out. CSW clarified our policy to the NH that our staff cannot accompany the pt during the ride, but one of the tech will get him into the PACS bus. PGDRS  was completed upon dc.CSW requested Nurse to call report and clinicals have been faxed    Mental Status Exam:    Hygiene:   fair  Cooperation:  Cooperative  Eye Contact:  Good  Affect:  Appropriate  Speech:  Normal  Thought Progress:  Disorganized  Thought Content:  Normal  Suicidal:  None  Homicidal:  None  Memory:  Deficits  Orientation:  Person  Reliability:  fair  Insight:  Poor  Judgement:  Poor       05/07/19 1104   Develop and Maintain Individualized Safety Plan   Safety Measures suicide assessment completed   Violence Risk   Feels Like Hurting Others no   Previous Attempt to Harm Others no   C-SSRS (Recent)   Wish to be Dead no   Suicidal Thoughts no   Suicidal Thought with Method No Plan/Intent no   Suicidal Intent (without Specific Plan) no   Suicide Intent with Specific Plan no   Describe Plan (Suicide Intent) no   Suicide Behavior no       Goal: Optimized Coping Skills in Response to Life  Stressors    Intervention: Promote Effective Coping Strategies   05/07/19 1104   Coping/Psychosocial Interventions   Supportive Measures positive reinforcement provided;verbalization of feelings encouraged;active listening utilized;goal setting facilitated       Goal: Develops/Participates in Therapeutic [place] to Support Successful Transition    Intervention: Foster Therapeutic [place]   05/07/19 1104   Interventions   Trust Relationship/Rapport emotional support provided;empathic listening provided;questions answered;questions encouraged;reassurance provided;thoughts/feelings acknowledged     Intervention: Mutually Develop Transition Plan         05/07/19 1104   Mutually Develop Transition Plan   Transition Support community resources reviewed;follow-up care coordinated

## 2019-05-07 NOTE — DISCHARGE SUMMARY
Admission Date: 4/25/2019    Discharge Date: 5/7/2019      Psychiatric History & Reason for Hospitalization:  Chief Complaint: suicidal ideation, dementia related behavioral issues, physical aggression and paranoia     History of Present Illness:     Patient is a 53 y.o. male who presents with suicidal ideation, dementia related behavioral issues, physical aggression and paranoia. Onset of symptoms was gradual starting several days ago.  Symptoms have been present on an increasingly more frequent basis. Symptoms are associated with depressed mood, agitation and irritability.  Symptoms are aggravated by cognitive impairment.   Patient's symptoms occur in the context of cognitive impairment and physical health limitations.     Patient was admitted to the behavioral health unit after medical clearance in the ED.  Patient was sent to us from [facility] in [facility].     Records from [facility] indicate that the patient has had issues with physical aggression paranoia as well as statements of suicide.  Patient yesterday was noted to have become physically aggressive with staff and and made threats of suicide as well as threats to kill a resident.  His behaviors were triggered by his belief that his close were not his.  Therefore unable to convince the patient that his closed were actually his.  It appears that patient has had paranoia and issues similar to this but perhaps not to this level for the last few days we have notes going back to the 19th of this month all indicating that the patient has had paranoia surrounding various issues especially his clothing.  He has had a few different episodes of becoming aggressive with staff.  It appears the patient has been having some medication changes made at the nursing home.  He seems to have had some increases in his Seroquel dosing.  Does not seem to have been much change in behaviors secondary to the medication  changes.     Patient today when I spoke with him and reports that he lives at home with his mom.  He notes that his father has passed away.  He later notes that may be that he is wrong and his mom is also passed away.  Patient was not oriented to place.  And asked where he was he stated he was in his room.  Patient was also not oriented to time.  He reported that the year was 2006.  He was surprised when I told him that it was 2019.     Patient does not have any recollection of his aggression.  When asked the patient about suicidal attempts he reported that at times he has had suicidal thoughts.  He minimized these by stating that they just happen briefly when he is upset.     All background history is from the patient's own reports.  I do not know the accuracy or the reliability of these.  He did seem confident in answering these questions.     Psychiatric Review Of Systems:  excessive irritability, suicidal ideations, unstable mood and cognitive impairment and physical aggression     Past Psychiatric History:     Psychiatric Hospitalizations: Patient has had no prior hospitalizations.     Suicide Attempts: Patient has had no prior suicide attempts.     Prior Treatment and Medications Tried: on seroquel, effexor and buspar at the nursing home.     History of violence or legal issues: The patient has no significant history of legal issues.          Diagnostic Data:    Recent Results (from the past 168 hour(s))   POC Glucose Once    Collection Time: 04/30/19 11:49 AM   Result Value Ref Range    Glucose 106 70 - 130 mg/dL       No results found.      Summary of Hospital Course:     Patient was admitted to the behavioral health unit at [facility] to ensure patient safety.  Patient was provided treatment with the unit milieu, activities, therapies and psychopharmacological management.  Patient was placed on Q15 minute checks and Suicide, Elopement and Fall.     Dr. Topete was consulted for management of  "medical co-morbidities.      Patient was restarted on the following psychiatric medications:   --Effexor  --Buspar  --Seroquel      The following medication changes were made during the hospital stay:   --Effexor XR changed to Zoloft given lack of efficacy  --Buspar 10mg TID  Stopped due to lack of efficacy  --Risperdal started for paranoia titrated to 1mg qAM & 1.5mg qHS to good effect  --Zoloft started and titrated to 125mg daily for affective concerns to good effect  --Melatonin 3mg qHS used to good effect for sleep    Patient had improvement over the course of the hospital stay and tolerated medications well.  Behavioral disturbances resolved during stay.      Substance abuse issues were not present.     At time of interview, patient adamantly denies any thoughts of death or dying.  The patient also adamantly denies any suicidal ideations, intentions or plans.  Denies any homicidal ideations, intentions or plans.  Denies any auditory visual hallucinations.  Denies paranoia.  Not grossly psychotic.  The patient does not constitute an imminent risk of harm to self or others, at time of the interview.  Therefore, patient does not meet commitment criteria at this time.      Patients Condition at Discharge:  Patient is stable for discharge and is not an imminent threat to self or others.         Discharging Exam:    Targeted PE:   General Appearance: alert and appears stated age,  Hygiene:   fair  Gait & Station: Wheelchair  Musculoskeletal: spastic movements of his upper extremity     Mental Status Exam:   Cooperation:  Cooperative  Eye Contact:  fair  Psychomotor Behavior:  Appropriate  Mood: \"OK\"  Affect:  Pleasantly confused  Speech:  Normal   Thought Process:  DysCognitive  Associations: Goal Directed  Thought Content:                Mood congurent              Suicidal:  Denies              Homicidal:  None              Hallucinations:  Not demonstrated today              Delusion:  None expressed nor " overt  Cognitive Functioning:              Fund of Info: Poor              Consciousness: awake and alert  Reliability:  Limited by NCD  Insight:  Limited by Neurocog d/o  Judgement:  improving  Impulse Control:  improving      AIMS: 0      Discharging Diagnoses:    Dementia with behavioral disturbance    Severe episode of recurrent major depressive disorder, with psychotic features (CMS/HCC)    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       risperiDONE 0.5 MG tablet  Commonly known as:  risperDAL  Start taking on:  5/8/2019      Take 3 tablets by mouth Daily.       sertraline 50 MG tablet  Commonly known as:  ZOLOFT  Start taking on:  5/8/2019      Take 2.5 tablets by mouth Daily.       traZODone 50 MG tablet  Commonly known as:  DESYREL      Take 1 tablet by mouth At Night As Needed for Sleep (insomnia).          CHANGE how you take these medications      Instructions Last Dose Given Next Dose Due   acetaminophen 325 MG tablet  Commonly known as:  TYLENOL  What changed:    · medication strength  · how much to take  · when to take this  · reasons to take this      Take 2 tablets by mouth Every 8 (Eight) Hours As Needed for Mild Pain  or Moderate Pain  (severe pain (7-10)).       Ergocalciferol 2000 units capsule  What changed:  Another medication with the same name was removed. Continue taking this medication, and follow the directions you see here.      Take  by mouth Daily.       melatonin 3 MG tablet  What changed:    · medication strength  · how much to take      Take 1 tablet by mouth Every Night.       omeprazole 40 MG capsule  Commonly known as:  priLOSEC  What changed:  Another medication with the same name was removed.  Continue taking this medication, and follow the directions you see here.      Take 40 mg by mouth Daily.          CONTINUE taking these medications      Instructions Last Dose Given Next Dose Due   amLODIPine 10 MG tablet  Commonly known as:  NORVASC      Take 10 mg by mouth Daily.       aspirin 81 MG chewable tablet      Chew 81 mg Daily.       atorvastatin 10 MG tablet  Commonly known as:  LIPITOR      Take 10 mg by mouth Daily.       cyclobenzaprine 10 MG tablet  Commonly known as:  FLEXERIL      Take 10 mg by mouth 3 (Three) Times a Day.       folic acid 1 MG tablet  Commonly known as:  FOLVITE      Take 1 mg by mouth Daily.       lisinopril 10 MG tablet  Commonly known as:  PRINIVIL,ZESTRIL      Take 10 mg by mouth Daily.       nitroglycerin 0.4 MG SL tablet  Commonly known as:  NITROSTAT      Place 0.4 mg under the tongue.       polyethylene glycol packet  Commonly known as:  MIRALAX      Take 17 g by mouth Daily.       raNITIdine 150 MG tablet  Commonly known as:  ZANTAC      Take 150 mg by mouth Every Night.       tamsulosin 0.4 MG capsule 24 hr capsule  Commonly known as:  FLOMAX      Take 1 capsule by mouth Every Night.          STOP taking these medications    busPIRone 10 MG tablet  Commonly known as:  BUSPAR        ondansetron 8 MG tablet  Commonly known as:  ZOFRAN        QUEtiapine 100 MG tablet  Commonly known as:  SEROquel        QUEtiapine 50 MG tablet  Commonly known as:  SEROquel        venlafaxine  MG 24 hr capsule  Commonly known as:  EFFEXOR-XR              Where to Get Your Medications      You can get these medications from any pharmacy    Bring a paper prescription for each of these medications  · acetaminophen 325 MG tablet  · melatonin 3 MG tablet  · risperiDONE 0.5 MG tablet  · risperiDONE 1 MG tablet  · sertraline 50 MG tablet  · traZODone 50 MG tablet           Justification for multiple antipsychotic medications at discharge:    --Not Applicable.  Medication for smoking  cessation:   --Patient does not smoke and medication is not indicated.  Medication for substance abuse:   --Patient does not have a substance use diagnosis and medication is not indicated.    Discharge Diet:   As per pre-admission.  Activity Level:   As per pre-admission.    Disposition: Patient was discharged to nursing home.    Outpatient Follow-Up:  Follow-up Information     Bárbara Mendoza APRN .    Specialty:  Family Medicine  Contact information:  [address] [place], [place] [id number] [phone]                   Time Spent: More than 30 minutes.    Warner Bragg II, MD  05/07/19  11:24 AM

## 2019-05-07 NOTE — PLAN OF CARE
Problem: Patient Care Overview  Goal: Plan of Care Review  Outcome: Ongoing (interventions implemented as appropriate)   05/06/19 2227   Coping/Psychosocial   Plan of Care Reviewed With patient   Coping/Psychosocial   Patient Agreement with Plan of Care agrees   Plan of Care Review   Progress no change     Goal: Individualization and Mutuality  Outcome: Ongoing (interventions implemented as appropriate)    Goal: Discharge Needs Assessment  Outcome: Ongoing (interventions implemented as appropriate)    Goal: Interprofessional Rounds/Family Conf  Outcome: Ongoing (interventions implemented as appropriate)      Problem: Overarching Goals (Adult)  Goal: Adheres to Safety Considerations for Self and Others  Outcome: Ongoing (interventions implemented as appropriate)   05/06/19 2227   Overarching Goals (Adult)   Adheres to Safety Considerations for Self and Others making progress toward outcome     Goal: Optimized Coping Skills in Response to Life Stressors  Outcome: Ongoing (interventions implemented as appropriate)   05/06/19 2227   Overarching Goals (Adult)   Optimized Coping Skills in Response to Life Stressors making progress toward outcome     Goal: Develops/Participates in Therapeutic [place] to Support Successful Transition  Outcome: Ongoing (interventions implemented as appropriate)   05/06/19 2227   Overarching Goals (Adult)   Develops/Participates in Therapeutic [place] to Support Successful Transition making progress toward outcome       Problem: Skin Injury Risk (Adult)  Goal: Skin Health and Integrity  Outcome: Ongoing (interventions implemented as appropriate)   05/06/19 2227   Skin Injury Risk (Adult)   Skin Health and Integrity making progress toward outcome       Problem: Fall Risk (Adult)  Goal: Absence of Fall  Outcome: Ongoing (interventions implemented as appropriate)   05/06/19 2227   Fall Risk (Adult)   Absence of Fall making progress toward outcome       Problem: Violence, Risk/Actual  (Adult)  Goal: Impulse Control  Outcome: Ongoing (interventions implemented as appropriate)   05/06/19 2227   Violence, Risk/Actual (Adult)   Impulse Control making progress toward outcome       Problem: Cognitive Impairment (Dementia Signs/Symptoms) (Adult)  Goal: Optimized Cognitive Function (Dementia Signs/Symptoms)  Outcome: Ongoing (interventions implemented as appropriate)   05/06/19 2227   Optimized Cognitive Function (Dementia Signs/Symptoms)   Optimized Cognitive Ability Action Step (STG) Outcome making progress toward outcome       Problem: Behavioral Impairment (Dementia Signs/Symptoms) (Adult)  Goal: Improved Behavioral Control (Dementia Signs/Symptoms)  Outcome: Ongoing (interventions implemented as appropriate)   05/06/19 2227   Improved Behavioral Control (Dementia Signs/Symptoms)   Improved Behavioral Control Action Step (STG) Outcome making progress toward outcome

## 2019-05-07 NOTE — CONSULTS
Adult Nutrition  Assessment    Patient Name:  Sg Vega  YOB: 1965  MRN: [id number]  Admit Date:  4/25/2019    Assessment Date:  5/7/2019    Comments:  Pt reports good appetite.  Voiced no food preferences.  Intake 100% - 5x plus 100% - 2x, 0% - 1x for snacks. Meds and labs reviewed.  Pt has discharge orders.    Reason for Assessment     Row Name 05/07/19 1656          Reason for Assessment    Reason For Assessment  follow-up protocol             Labs/Tests/Procedures/Meds     Row Name 05/07/19 1656          Labs/Procedures/Meds    Lab Results Reviewed  reviewed        Medications    Pertinent Medications Reviewed  reviewed             Nutrition Prescription Ordered     Row Name 05/07/19 1656          Nutrition Prescription PO    Current PO Diet  Regular     Supplement  Boost Plus (Ensure Enlive, Ensure Plus);Milk;Magic Cup     Supplement Frequency  Other (comment) Boost, Milk each meal, Magic Cup with lunch and supper         Evaluation of Received Nutrient/Fluid Intake     Row Name 05/07/19 1657          PO Evaluation    Number of Meals  5 plus 3 snacks     % PO Intake  100% - 5x plus 100% - 2x, 0% - 1x for snacks               Electronically signed by:  Floridalma Tyson RD  05/07/19 4:58 PM

## 2019-05-08 NOTE — THERAPY DISCHARGE NOTE
Behavioral Health  - Occupational Therapy Discharge Summary  [facility]     Patient Name: Sg Vega  : 1965  MRN: [id number]    Today's Date: 2019  Onset of Illness/Injury or Date of Surgery: 19    Date of Referral to OT: 19  Referring Physician: FERNANDO Gutierrez      Admit Date: 2019        OT Recommendation and Plan    Visit Dx:    ICD-10-CM ICD-9-CM   1. Oral phase dysphagia R13.11 787.21   2. Impaired functional mobility, balance, and endurance Z74.09 V49.89   3. Impaired mobility and activities of daily living Z74.09 799.89               Rehab Goal Summary     Row Name 19 1528             Occupational Therapy Goals    Transfer Goal Selection (OT)  transfer, OT goal 1  -      Toileting Goal Selection (OT)  toileting, OT goal 1  -      Self-Feeding Goal Selection (OT)  self feeding, OT goal 1  -      Strength Goal Selection (OT)  strength, OT goal 1  -      Coordination Goal Selection (OT)  coordination, OT goal 1  -         Transfer Goal 1 (OT)    Activity/Assistive Device (Transfer Goal 1, OT)  sit-to-stand/stand-to-sit;bed-to-chair/chair-to-bed;toilet;walker, rolling  -      [place] Level/Cues Needed (Transfer Goal 1, OT)  contact guard assist  -      Time Frame (Transfer Goal 1, OT)  long term goal (LTG);by discharge  -      Progress/Outcome (Transfer Goal 1, OT)  goal not met  -         Toileting Goal 1 (OT)    Activity/Device (Toileting Goal 1, OT)  toileting skills, all  -      [place] Level/Cues Needed (Toileting Goal 1, OT)  contact guard assist  -      Time Frame (Toileting Goal 1, OT)  long term goal (LTG);by discharge  -      Progress/Outcome (Toileting Goal 1, OT)  goal not met  -         Self-Feeding Goal 1 (OT)    Activity/Assistive Device (Self-Feeding Goal 1, OT)  self-feeding skills, all  -      [place] Level/Cues Needed (Self-Feeding Goal 1, OT)  set-up required;supervision required  -      Time Frame  (Self-Feeding Goal 1, OT)  long term goal (LTG);by discharge  -      Progress/Outcomes (Self-Feeding Goal 1, OT)  goal not met  -         Strength Goal 1 (OT)    Strength Goal 1 (OT)  Pt will complete BUE AROM exercises in all planes to increase strength by 1/2 grade to increase functional independence with ADLs.   -      Time Frame (Strength Goal 1, OT)  long term goal (LTG);by discharge  -      Progress/Outcome (Strength Goal 1, OT)  goal met  -         Coordination Goal 1 (OT)    Activity/Assistive Device (Coordination Goal 1, OT)  GM task;FM task  -      [place] Level/Cues Needed (Coordination Goal 1, OT)  minimum assist (75% or more patient effort);verbal cues required  -      Time Frame (Coordination Goal 1, OT)  long term goal (LTG);by discharge  -      Progress/Outcomes (Coordination Goal 1, OT)  goal met  -        User Key  (r) = Recorded By, (t) = Taken By, (c) = Cosigned By    Initials Name Provider Type Discipline     Josephine Lowe, OTR/L Occupational Therapist OT          Outcome Measures     Row Name 05/06/19 1500 05/06/19 1125          How much help from another person do you currently need...    Turning from your back to your side while in flat bed without using bedrails?  3  -TA  --     Moving from lying on back to sitting on the side of a flat bed without bedrails?  3  -TA  --     Moving to and from a bed to a chair (including a wheelchair)?  2  -TA  --     Standing up from a chair using your arms (e.g., wheelchair, bedside chair)?  2  -TA  --     Climbing 3-5 steps with a railing?  2  -TA  --     To walk in hospital room?  2  -TA  --     AM-PAC 6 Clicks Score  14  -TA  --        How much help from another is currently needed...    Putting on and taking off regular lower body clothing?  --  3  -BB     Bathing (including washing, rinsing, and drying)  --  3  -BB     Toileting (which includes using toilet bed pan or urinal)  --  3  -BB     Putting on and taking off regular  upper body clothing  --  3  -BB     Taking care of personal grooming (such as brushing teeth)  --  3  -BB     Eating meals  --  3  -BB     Score  --  18  -BB        Functional Assessment    Outcome Measure Options  AM-PAC 6 Clicks Basic Mobility (PT)  -MELISSA  --       User Key  (r) = Recorded By, (t) = Taken By, (c) = Cosigned By    Initials Name Provider Type    TA Kaur Mitchell, PTA Physical Therapy Assistant    BB Netta Robles, RESENDEZ/L Occupational Therapy Assistant          Therapy Suggested Charges     Code   Minutes Charges    None                 OT Discharge Summary  Anticipated Discharge Disposition (OT): skilled nursing facility  Reason for Discharge: Discharge from facility, Per MD order  Outcomes Achieved: Refer to plan of care for updates on goals achieved, Patient able to partially acheive established goals  Discharge Destination: SNF      Josephine Lowe OTR/KASEY  5/8/2019

## 2019-05-08 NOTE — THERAPY DISCHARGE NOTE
behavioral Health - Physical Therapy Discharge Summary  [facility]       Patient Name: Sg Vgea  : 1965  MRN: [id number]    Today's Date: 2019  Onset of Illness/Injury or Date of Surgery: 19    Date of Referral to PT: 19  Referring Physician: FERNANDO Gutierrez      Admit Date: 2019      PT Recommendation and Plan    Visit Dx:    ICD-10-CM ICD-9-CM   1. Oral phase dysphagia R13.11 787.21   2. Impaired functional mobility, balance, and endurance Z74.09 V49.89   3. Impaired mobility and activities of daily living Z74.09 799.89       Outcome Measures     Row Name 19 1500 19 1125          How much help from another person do you currently need...    Turning from your back to your side while in flat bed without using bedrails?  3  -TA  --     Moving from lying on back to sitting on the side of a flat bed without bedrails?  3  -TA  --     Moving to and from a bed to a chair (including a wheelchair)?  2  -TA  --     Standing up from a chair using your arms (e.g., wheelchair, bedside chair)?  2  -TA  --     Climbing 3-5 steps with a railing?  2  -TA  --     To walk in hospital room?  2  -TA  --     AM-PAC 6 Clicks Score  14  -TA  --        How much help from another is currently needed...    Putting on and taking off regular lower body clothing?  --  3  -BB     Bathing (including washing, rinsing, and drying)  --  3  -BB     Toileting (which includes using toilet bed pan or urinal)  --  3  -BB     Putting on and taking off regular upper body clothing  --  3  -BB     Taking care of personal grooming (such as brushing teeth)  --  3  -BB     Eating meals  --  3  -BB     Score  --  18  -BB        Functional Assessment    Outcome Measure Options  AM-PAC 6 Clicks Basic Mobility (PT)  -TA  --       User Key  (r) = Recorded By, (t) = Taken By, (c) = Cosigned By    Initials Name Provider Type    TA Kaur Mitchell, PTA Physical Therapy Assistant    BB Netta Robles, DIPTI/KASEY  Occupational Therapy Assistant              Rehab Goal Summary     Row Name 05/08/19 1600 05/08/19 1528          Physical Therapy Goals    Bed Mobility Goal Selection (PT)  bed mobility, PT goal 1  -CB  --     Transfer Goal Selection (PT)  transfer, PT goal 1  -CB  --     Gait Training Goal Selection (PT)  gait training, PT goal 1  -CB  --        Bed Mobility Goal 1 (PT)    Activity/Assistive Device (Bed Mobility Goal 1, PT)  sit to supine;supine to sit;scooting  -CB  --     [place] Level/Cues Needed (Bed Mobility Goal 1, PT)  standby assist  -CB  --     Time Frame (Bed Mobility Goal 1, PT)  by discharge  -CB  --     Progress/Outcomes (Bed Mobility Goal 1, PT)  goal not met  -CB  --        Transfer Goal 1 (PT)    Activity/Assistive Device (Transfer Goal 1, PT)  bed-to-chair/chair-to-bed  -CB  --     [place] Level/Cues Needed (Transfer Goal 1, PT)  contact guard assist  -CB  --     Time Frame (Transfer Goal 1, PT)  by discharge  -CB  --     Progress/Outcome (Transfer Goal 1, PT)  goal not met  -CB  --        Gait Training Goal 1 (PT)    Activity/Assistive Device (Gait Training Goal 1, PT)  gait (walking locomotion);walker, rolling  -CB  --     [place] Level (Gait Training Goal 1, PT)  contact guard assist  -CB  --     Distance (Gait Goal 1, PT)  15' x 1  -CB  --     Time Frame (Gait Training Goal 1, PT)  by discharge  -CB  --     Barriers (Gait Training Goal 1, PT)  MS, agitation  -CB  --     Progress/Outcome (Gait Training Goal 1, PT)  goal not met  -CB  --        Occupational Therapy Goals    Transfer Goal Selection (OT)  --  transfer, OT goal 1  -     Toileting Goal Selection (OT)  --  toileting, OT goal 1  -     Self-Feeding Goal Selection (OT)  --  self feeding, OT goal 1  -     Strength Goal Selection (OT)  --  strength, OT goal 1  -     Coordination Goal Selection (OT)  --  coordination, OT goal 1  -        Transfer Goal 1 (OT)    Activity/Assistive Device (Transfer Goal 1, OT)  --   sit-to-stand/stand-to-sit;bed-to-chair/chair-to-bed;toilet;walker, rolling  -     [place] Level/Cues Needed (Transfer Goal 1, OT)  --  contact guard assist  -     Time Frame (Transfer Goal 1, OT)  --  long term goal (LTG);by discharge  -     Progress/Outcome (Transfer Goal 1, OT)  --  goal not met  -        Toileting Goal 1 (OT)    Activity/Device (Toileting Goal 1, OT)  --  toileting skills, all  -     [place] Level/Cues Needed (Toileting Goal 1, OT)  --  contact guard assist  -     Time Frame (Toileting Goal 1, OT)  --  long term goal (LTG);by discharge  -     Progress/Outcome (Toileting Goal 1, OT)  --  goal not met  -        Self-Feeding Goal 1 (OT)    Activity/Assistive Device (Self-Feeding Goal 1, OT)  --  self-feeding skills, all  -     [place] Level/Cues Needed (Self-Feeding Goal 1, OT)  --  set-up required;supervision required  -     Time Frame (Self-Feeding Goal 1, OT)  --  long term goal (LTG);by discharge  -     Progress/Outcomes (Self-Feeding Goal 1, OT)  --  goal not met  -        Strength Goal 1 (OT)    Strength Goal 1 (OT)  --  Pt will complete BUE AROM exercises in all planes to increase strength by 1/2 grade to increase functional independence with ADLs.   -     Time Frame (Strength Goal 1, OT)  --  long term goal (LTG);by discharge  -     Progress/Outcome (Strength Goal 1, OT)  --  goal met  -        Coordination Goal 1 (OT)    Activity/Assistive Device (Coordination Goal 1, OT)  --  GM task;FM task  -     [place] Level/Cues Needed (Coordination Goal 1, OT)  --  minimum assist (75% or more patient effort);verbal cues required  -     Time Frame (Coordination Goal 1, OT)  --  long term goal (LTG);by discharge  -     Progress/Outcomes (Coordination Goal 1, OT)  --  goal met  -       User Key  (r) = Recorded By, (t) = Taken By, (c) = Cosigned By    Initials Name Provider Type Discipline    Laura Salazar PT Physical Therapist PT      Josephine Lowe, OTR/L Occupational Therapist OT              PT Discharge Summary  Anticipated Discharge Disposition (PT): skilled nursing facility  Reason for Discharge: Discharge from facility, Per MD order  Outcomes Achieved: Refer to plan of care for updates on goals achieved  Discharge Destination: Home      Laura Healy, PT   5/8/2019

## 2022-08-23 ENCOUNTER — HOSPITAL ENCOUNTER (EMERGENCY)
Facility: HOSPITAL | Age: 57
Discharge: NURSING, REHAB OR LONG-TERM CARE | End: 2022-08-24
Attending: FAMILY MEDICINE | Admitting: FAMILY MEDICINE

## 2022-08-23 DIAGNOSIS — R11.2 NAUSEA AND VOMITING, UNSPECIFIED VOMITING TYPE: Primary | ICD-10-CM

## 2022-08-23 LAB
ALBUMIN SERPL-MCNC: 3.8 G/DL (ref 3.5–5.2)
ALBUMIN/GLOB SERPL: 1.2 G/DL
ALP SERPL-CCNC: 106 U/L (ref 39–117)
ALT SERPL W P-5'-P-CCNC: 28 U/L (ref 1–41)
ANION GAP SERPL CALCULATED.3IONS-SCNC: 12 MMOL/L (ref 5–15)
AST SERPL-CCNC: 23 U/L (ref 1–40)
BASOPHILS # BLD AUTO: 0.04 10*3/MM3 (ref 0–0.2)
BASOPHILS NFR BLD AUTO: 0.3 % (ref 0–1.5)
BILIRUB SERPL-MCNC: 1.1 MG/DL (ref 0–1.2)
BUN SERPL-MCNC: 18 MG/DL (ref 6–20)
BUN/CREAT SERPL: 14 (ref 7–25)
CALCIUM SPEC-SCNC: 9 MG/DL (ref 8.6–10.5)
CHLORIDE SERPL-SCNC: 98 MMOL/L (ref 98–107)
CO2 SERPL-SCNC: 23 MMOL/L (ref 22–29)
CREAT SERPL-MCNC: 1.29 MG/DL (ref 0.76–1.27)
D-LACTATE SERPL-SCNC: 1.4 MMOL/L (ref 0.5–2)
DEPRECATED RDW RBC AUTO: 40.4 FL (ref 37–54)
EGFRCR SERPLBLD CKD-EPI 2021: 65.5 ML/MIN/1.73
EOSINOPHIL # BLD AUTO: 0 10*3/MM3 (ref 0–0.4)
EOSINOPHIL NFR BLD AUTO: 0 % (ref 0.3–6.2)
ERYTHROCYTE [DISTWIDTH] IN BLOOD BY AUTOMATED COUNT: 12.1 % (ref 12.3–15.4)
GLOBULIN UR ELPH-MCNC: 3.1 GM/DL
GLUCOSE SERPL-MCNC: 154 MG/DL (ref 65–99)
HCT VFR BLD AUTO: 36.1 % (ref 37.5–51)
HGB BLD-MCNC: 12.5 G/DL (ref 13–17.7)
HOLD SPECIMEN: NORMAL
HOLD SPECIMEN: NORMAL
IMM GRANULOCYTES # BLD AUTO: 0.08 10*3/MM3 (ref 0–0.05)
IMM GRANULOCYTES NFR BLD AUTO: 0.5 % (ref 0–0.5)
LYMPHOCYTES # BLD AUTO: 0.57 10*3/MM3 (ref 0.7–3.1)
LYMPHOCYTES NFR BLD AUTO: 3.9 % (ref 19.6–45.3)
MCH RBC QN AUTO: 31.6 PG (ref 26.6–33)
MCHC RBC AUTO-ENTMCNC: 34.6 G/DL (ref 31.5–35.7)
MCV RBC AUTO: 91.2 FL (ref 79–97)
MONOCYTES # BLD AUTO: 1.52 10*3/MM3 (ref 0.1–0.9)
MONOCYTES NFR BLD AUTO: 10.4 % (ref 5–12)
NEUTROPHILS NFR BLD AUTO: 12.43 10*3/MM3 (ref 1.7–7)
NEUTROPHILS NFR BLD AUTO: 84.9 % (ref 42.7–76)
NRBC BLD AUTO-RTO: 0 /100 WBC (ref 0–0.2)
PLATELET # BLD AUTO: 163 10*3/MM3 (ref 140–450)
PMV BLD AUTO: 9.7 FL (ref 6–12)
POTASSIUM SERPL-SCNC: 4.1 MMOL/L (ref 3.5–5.2)
PROT SERPL-MCNC: 6.9 G/DL (ref 6–8.5)
RBC # BLD AUTO: 3.96 10*6/MM3 (ref 4.14–5.8)
SODIUM SERPL-SCNC: 133 MMOL/L (ref 136–145)
WBC NRBC COR # BLD: 14.64 10*3/MM3 (ref 3.4–10.8)
WHOLE BLOOD HOLD COAG: NORMAL
WHOLE BLOOD HOLD SPECIMEN: NORMAL

## 2022-08-23 PROCEDURE — 86900 BLOOD TYPING SEROLOGIC ABO: CPT | Performed by: FAMILY MEDICINE

## 2022-08-23 PROCEDURE — 83605 ASSAY OF LACTIC ACID: CPT

## 2022-08-23 PROCEDURE — 85610 PROTHROMBIN TIME: CPT | Performed by: FAMILY MEDICINE

## 2022-08-23 PROCEDURE — 86850 RBC ANTIBODY SCREEN: CPT | Performed by: FAMILY MEDICINE

## 2022-08-23 PROCEDURE — 86901 BLOOD TYPING SEROLOGIC RH(D): CPT | Performed by: FAMILY MEDICINE

## 2022-08-23 PROCEDURE — 80053 COMPREHEN METABOLIC PANEL: CPT

## 2022-08-23 PROCEDURE — 99284 EMERGENCY DEPT VISIT MOD MDM: CPT

## 2022-08-23 PROCEDURE — 85025 COMPLETE CBC W/AUTO DIFF WBC: CPT

## 2022-08-23 PROCEDURE — 83690 ASSAY OF LIPASE: CPT | Performed by: FAMILY MEDICINE

## 2022-08-23 RX ORDER — RISPERIDONE 0.5 MG/1
1 TABLET ORAL DAILY
COMMUNITY

## 2022-08-23 RX ORDER — LISINOPRIL 5 MG/1
5 TABLET ORAL DAILY
COMMUNITY
End: 2022-08-23

## 2022-08-23 RX ORDER — POLYETHYLENE GLYCOL 3350 17 G/17G
1 POWDER, FOR SOLUTION ORAL DAILY
COMMUNITY
End: 2022-08-23

## 2022-08-23 RX ORDER — HYDROCODONE BITARTRATE AND ACETAMINOPHEN 5; 325 MG/1; MG/1
1-2 TABLET ORAL
COMMUNITY
Start: 2022-07-08

## 2022-08-23 RX ORDER — BACLOFEN 10 MG/1
10 TABLET ORAL 3 TIMES DAILY
COMMUNITY

## 2022-08-23 RX ORDER — SODIUM CHLORIDE 0.9 % (FLUSH) 0.9 %
10 SYRINGE (ML) INJECTION AS NEEDED
Status: DISCONTINUED | OUTPATIENT
Start: 2022-08-23 | End: 2022-08-24 | Stop reason: HOSPADM

## 2022-08-23 RX ORDER — SERTRALINE HYDROCHLORIDE 100 MG/1
100 TABLET, FILM COATED ORAL DAILY
COMMUNITY

## 2022-08-24 ENCOUNTER — APPOINTMENT (OUTPATIENT)
Dept: GENERAL RADIOLOGY | Facility: HOSPITAL | Age: 57
End: 2022-08-24

## 2022-08-24 VITALS
HEART RATE: 94 BPM | WEIGHT: 217 LBS | OXYGEN SATURATION: 93 % | HEIGHT: 74 IN | SYSTOLIC BLOOD PRESSURE: 110 MMHG | TEMPERATURE: 97.7 F | RESPIRATION RATE: 18 BRPM | BODY MASS INDEX: 27.85 KG/M2 | DIASTOLIC BLOOD PRESSURE: 61 MMHG

## 2022-08-24 LAB
ABO GROUP BLD: NORMAL
BACTERIA UR QL AUTO: ABNORMAL /HPF
BILIRUB UR QL STRIP: NEGATIVE
BLD GP AB SCN SERPL QL: NEGATIVE
CLARITY UR: ABNORMAL
COLOR UR: ABNORMAL
GLUCOSE UR STRIP-MCNC: NEGATIVE MG/DL
HGB UR QL STRIP.AUTO: NEGATIVE
HYALINE CASTS UR QL AUTO: ABNORMAL /LPF
INR PPP: 1.1 (ref 0.8–1.2)
KETONES UR QL STRIP: ABNORMAL
LEUKOCYTE ESTERASE UR QL STRIP.AUTO: ABNORMAL
LIPASE SERPL-CCNC: 25 U/L (ref 13–60)
Lab: NORMAL
NITRITE UR QL STRIP: NEGATIVE
PH UR STRIP.AUTO: 7 [PH] (ref 5–9)
PROT UR QL STRIP: ABNORMAL
PROTHROMBIN TIME: 14.1 SECONDS (ref 11.1–15.3)
RBC # UR STRIP: ABNORMAL /HPF
REF LAB TEST METHOD: ABNORMAL
RH BLD: POSITIVE
SP GR UR STRIP: 1.02 (ref 1–1.03)
SQUAMOUS #/AREA URNS HPF: ABNORMAL /HPF
T&S EXPIRATION DATE: NORMAL
UROBILINOGEN UR QL STRIP: ABNORMAL
WBC # UR STRIP: ABNORMAL /HPF

## 2022-08-24 PROCEDURE — P9612 CATHETERIZE FOR URINE SPEC: HCPCS

## 2022-08-24 PROCEDURE — 96374 THER/PROPH/DIAG INJ IV PUSH: CPT

## 2022-08-24 PROCEDURE — 74022 RADEX COMPL AQT ABD SERIES: CPT

## 2022-08-24 PROCEDURE — 81001 URINALYSIS AUTO W/SCOPE: CPT | Performed by: FAMILY MEDICINE

## 2022-08-24 PROCEDURE — 25010000002 ONDANSETRON PER 1 MG: Performed by: FAMILY MEDICINE

## 2022-08-24 PROCEDURE — 96361 HYDRATE IV INFUSION ADD-ON: CPT

## 2022-08-24 PROCEDURE — 25010000002 ONDANSETRON PER 1 MG: Performed by: EMERGENCY MEDICINE

## 2022-08-24 PROCEDURE — 96376 TX/PRO/DX INJ SAME DRUG ADON: CPT

## 2022-08-24 RX ORDER — ONDANSETRON 2 MG/ML
4 INJECTION INTRAMUSCULAR; INTRAVENOUS ONCE
Status: COMPLETED | OUTPATIENT
Start: 2022-08-24 | End: 2022-08-24

## 2022-08-24 RX ORDER — ONDANSETRON 4 MG/1
4 TABLET, ORALLY DISINTEGRATING ORAL EVERY 6 HOURS PRN
Qty: 10 TABLET | Refills: 0 | Status: SHIPPED | OUTPATIENT
Start: 2022-08-24

## 2022-08-24 RX ORDER — SODIUM CHLORIDE 9 MG/ML
125 INJECTION, SOLUTION INTRAVENOUS CONTINUOUS
Status: DISCONTINUED | OUTPATIENT
Start: 2022-08-24 | End: 2022-08-24 | Stop reason: HOSPADM

## 2022-08-24 RX ADMIN — SODIUM CHLORIDE 125 ML/HR: 9 INJECTION, SOLUTION INTRAVENOUS at 00:46

## 2022-08-24 RX ADMIN — ONDANSETRON 4 MG: 2 INJECTION INTRAMUSCULAR; INTRAVENOUS at 04:20

## 2022-08-24 RX ADMIN — SODIUM CHLORIDE 500 ML: 9 INJECTION, SOLUTION INTRAVENOUS at 00:46

## 2022-08-24 RX ADMIN — ONDANSETRON 4 MG: 2 INJECTION INTRAMUSCULAR; INTRAVENOUS at 00:46

## 2022-08-24 NOTE — ED PROVIDER NOTES
Subjective   Patient presents to the emergency department from nursing home with nausea vomiting with suspected hematemesis.  Patient does not have any complaints of his own and asks for a Mountain Dew.      Vomiting  The primary symptoms include nausea and vomiting. Primary symptoms do not include fever, fatigue, abdominal pain, diarrhea, dysuria, myalgias or rash.   The illness does not include chills.   Nausea  The primary symptoms include nausea and vomiting. Primary symptoms do not include fever, fatigue, abdominal pain, diarrhea, dysuria, myalgias or rash.   The illness does not include chills.       Review of Systems   Constitutional: Negative for appetite change, chills, diaphoresis, fatigue and fever.   HENT: Negative for congestion, ear discharge, ear pain, nosebleeds, rhinorrhea, sinus pressure, sore throat and trouble swallowing.    Eyes: Negative for discharge and redness.   Respiratory: Negative for apnea, cough, chest tightness, shortness of breath and wheezing.    Cardiovascular: Negative for chest pain.   Gastrointestinal: Positive for nausea and vomiting. Negative for abdominal pain and diarrhea.   Endocrine: Negative for polyuria.   Genitourinary: Negative for dysuria, frequency and urgency.   Musculoskeletal: Negative for myalgias and neck pain.   Skin: Negative for color change and rash.   Allergic/Immunologic: Negative for immunocompromised state.   Neurological: Negative for dizziness, seizures, syncope, weakness, light-headedness and headaches.   Hematological: Negative for adenopathy. Does not bruise/bleed easily.   Psychiatric/Behavioral: Negative for behavioral problems and confusion.   All other systems reviewed and are negative.      Past Medical History:   Diagnosis Date   • ADHD (attention deficit hyperactivity disorder)    • Anemia    • Anxiety    • Ataxia    • Bradycardia    • Chronic inflammatory demyelinating polyneuritis (HCC)    • Chronic pain    • Constipation    • Depression   "  • GERD (gastroesophageal reflux disease)    • Hyperlipidemia    • Hypertension    • Hypokalemia    • Muscle weakness    • Radiculopathy    • Urinary retention        Allergies   Allergen Reactions   • Valsartan Nausea And Vomiting   • Penicillins Unknown (See Comments)     Pt stated allergic to some IV medicine that starts with a \"P\" but doesn't remember what it is. Possibly PCN.       History reviewed. No pertinent surgical history.    Family History   Problem Relation Age of Onset   • Alcohol abuse Father        Social History     Socioeconomic History   • Marital status: Single   Tobacco Use   • Smoking status: Never Smoker   • Smokeless tobacco: Never Used   Substance and Sexual Activity   • Alcohol use: No   • Drug use: No   • Sexual activity: Never           Objective   Physical Exam  Vitals and nursing note reviewed.   Constitutional:       Appearance: He is well-developed.   HENT:      Head: Normocephalic and atraumatic.      Nose: Nose normal.   Eyes:      General: No scleral icterus.        Right eye: No discharge.         Left eye: No discharge.      Conjunctiva/sclera: Conjunctivae normal.      Pupils: Pupils are equal, round, and reactive to light.   Neck:      Trachea: No tracheal deviation.   Cardiovascular:      Rate and Rhythm: Normal rate and regular rhythm.      Heart sounds: Normal heart sounds. No murmur heard.  Pulmonary:      Effort: Pulmonary effort is normal. No respiratory distress.      Breath sounds: Normal breath sounds. No stridor. No wheezing or rales.   Abdominal:      General: Bowel sounds are normal. There is no distension.      Palpations: Abdomen is soft. There is no mass.      Tenderness: There is no abdominal tenderness. There is no guarding or rebound.   Genitourinary:     Rectum: Guaiac result negative (stool normal color).   Musculoskeletal:      Cervical back: Normal range of motion and neck supple.   Skin:     General: Skin is warm and dry.      Findings: No erythema or " rash.   Neurological:      Mental Status: He is alert and oriented to person, place, and time.      Coordination: Coordination normal.   Psychiatric:         Behavior: Behavior normal.         Thought Content: Thought content normal.         Procedures           ED Course              Labs Reviewed   COMPREHENSIVE METABOLIC PANEL - Abnormal; Notable for the following components:       Result Value    Glucose 154 (*)     Creatinine 1.29 (*)     Sodium 133 (*)     All other components within normal limits    Narrative:     GFR Normal >60  Chronic Kidney Disease <60  Kidney Failure <15     CBC WITH AUTO DIFFERENTIAL - Abnormal; Notable for the following components:    WBC 14.64 (*)     RBC 3.96 (*)     Hemoglobin 12.5 (*)     Hematocrit 36.1 (*)     RDW 12.1 (*)     Neutrophil % 84.9 (*)     Lymphocyte % 3.9 (*)     Eosinophil % 0.0 (*)     Neutrophils, Absolute 12.43 (*)     Lymphocytes, Absolute 0.57 (*)     Monocytes, Absolute 1.52 (*)     Immature Grans, Absolute 0.08 (*)     All other components within normal limits   URINALYSIS W/ MICROSCOPIC IF INDICATED (NO CULTURE) - Abnormal; Notable for the following components:    Appearance, UA Turbid (*)     Ketones, UA Trace (*)     Protein, UA >=300 mg/dL (3+) (*)     Leuk Esterase, UA Small (1+) (*)     All other components within normal limits   URINALYSIS, MICROSCOPIC ONLY - Abnormal; Notable for the following components:    RBC, UA Too Numerous to Count (*)     Bacteria, UA 4+ (*)     All other components within normal limits   LACTIC ACID, PLASMA - Normal   LIPASE - Normal   PROTIME-INR - Normal    Narrative:     Therapeutic range for most indications is 2.0-3.0 INR,  or 2.5-3.5 for mechanical heart valves.   RAINBOW DRAW    Narrative:     The following orders were created for panel order [place] Draw.  Procedure                               Abnormality         Status                     ---------                               -----------         ------                      Green Top (Gel)[[id number]]                                  Final result               Lavender Top[[id number]]                                     Final result               Gold Top - SST[[id number]]                                   Final result               Light Blue Top[[id number]]                                   Final result                 Please view results for these tests on the individual orders.   TYPE AND SCREEN   PREVIOUS HISTORY   CBC AND DIFFERENTIAL    Narrative:     The following orders were created for panel order CBC & Differential.  Procedure                               Abnormality         Status                     ---------                               -----------         ------                     CBC Auto Differential[[id number]]        Abnormal            Final result                 Please view results for these tests on the individual orders.   GREEN TOP   LAVENDER TOP   GOLD TOP - SST   LIGHT BLUE TOP       XR Abdomen 2+ VW with Chest 1 VW    (Results Pending)                                         MDM    Final diagnoses:   Nausea and vomiting, unspecified vomiting type       ED Disposition  ED Disposition     ED Disposition   Discharge    Condition   Stable    Comment   --             Bárbara Mendoza, FERNANDO  [address] [facility] [address] [phone]    In 2 days           Medication List      New Prescriptions    ondansetron ODT 4 MG disintegrating tablet  Commonly known as: ZOFRAN-ODT  Place 1 tablet on the tongue Every 6 (Six) Hours As Needed for Nausea or Vomiting.           Where to Get Your Medications      These medications were sent to [facility], [place] - [address] [place] [address] - [phone] [facility] [phone] FX  [address] [address]    Phone: [phone]   · ondansetron ODT 4 MG disintegrating tablet          Ricki Meza MD  08/24/22 0113

## 2022-08-24 NOTE — ED NOTES
Patient has hx of GERD, esophageal varices.  Pt coming EMS from [facility].  Since 1800 today patient has experienced 2 episodes of coffee ground emesis.    Vitals:  130/72, HR 76, 96%, temp 97.2

## 2022-08-24 NOTE — ED NOTES
Pt presents to the ED with nausea x2 days and vomiting x 3 today. Pt has hx of upper GI bleeds and had 1 episode of coffee ground emesis en route. Pt states no pain at this time but is very thristy. Pt has hiccups at this time